# Patient Record
Sex: FEMALE | Race: WHITE | Employment: OTHER | URBAN - METROPOLITAN AREA
[De-identification: names, ages, dates, MRNs, and addresses within clinical notes are randomized per-mention and may not be internally consistent; named-entity substitution may affect disease eponyms.]

---

## 2017-01-04 ENCOUNTER — OFFICE VISIT (OUTPATIENT)
Dept: INTERNAL MEDICINE CLINIC | Age: 62
End: 2017-01-04

## 2017-01-04 VITALS
HEART RATE: 92 BPM | WEIGHT: 219 LBS | RESPIRATION RATE: 18 BRPM | SYSTOLIC BLOOD PRESSURE: 160 MMHG | DIASTOLIC BLOOD PRESSURE: 100 MMHG | BODY MASS INDEX: 34.37 KG/M2 | TEMPERATURE: 97.7 F | HEIGHT: 67 IN

## 2017-01-04 DIAGNOSIS — E03.9 ACQUIRED HYPOTHYROIDISM: ICD-10-CM

## 2017-01-04 DIAGNOSIS — J06.9 URI, ACUTE: ICD-10-CM

## 2017-01-04 DIAGNOSIS — I10 ESSENTIAL HYPERTENSION: ICD-10-CM

## 2017-01-04 DIAGNOSIS — N39.0 URINARY TRACT INFECTION WITHOUT HEMATURIA, SITE UNSPECIFIED: Primary | ICD-10-CM

## 2017-01-04 LAB
BILIRUBIN, POC: NEGATIVE
BLOOD URINE, POC: NEGATIVE
CLARITY, POC: CLEAR
COLOR, POC: YELLOW
GLUCOSE URINE, POC: NEGATIVE
KETONES, POC: NEGATIVE
LEUKOCYTE EST, POC: 25
NITRITE, POC: NEGATIVE
PH, POC: 7
PROTEIN, POC: NEGATIVE
SPECIFIC GRAVITY, POC: 1.01
TSH SERPL DL<=0.05 MIU/L-ACNC: 2.27 UIU/ML (ref 0.27–4.2)
UROBILINOGEN, POC: NEGATIVE

## 2017-01-04 PROCEDURE — 81002 URINALYSIS NONAUTO W/O SCOPE: CPT | Performed by: INTERNAL MEDICINE

## 2017-01-04 PROCEDURE — 36415 COLL VENOUS BLD VENIPUNCTURE: CPT | Performed by: INTERNAL MEDICINE

## 2017-01-04 PROCEDURE — 99214 OFFICE O/P EST MOD 30 MIN: CPT | Performed by: INTERNAL MEDICINE

## 2017-01-04 RX ORDER — SULFAMETHOXAZOLE AND TRIMETHOPRIM 800; 160 MG/1; MG/1
1 TABLET ORAL 2 TIMES DAILY
Qty: 20 TABLET | Refills: 0 | Status: SHIPPED | OUTPATIENT
Start: 2017-01-04 | End: 2017-01-14

## 2017-01-04 RX ORDER — METOPROLOL SUCCINATE 50 MG/1
50 TABLET, EXTENDED RELEASE ORAL 2 TIMES DAILY
Qty: 60 TABLET | Refills: 0 | Status: SHIPPED | OUTPATIENT
Start: 2017-01-04 | End: 2017-02-01 | Stop reason: SDUPTHER

## 2017-02-01 ENCOUNTER — OFFICE VISIT (OUTPATIENT)
Dept: INTERNAL MEDICINE CLINIC | Age: 62
End: 2017-02-01

## 2017-02-01 VITALS
RESPIRATION RATE: 18 BRPM | SYSTOLIC BLOOD PRESSURE: 122 MMHG | WEIGHT: 222 LBS | HEART RATE: 82 BPM | DIASTOLIC BLOOD PRESSURE: 84 MMHG | BODY MASS INDEX: 34.84 KG/M2 | HEIGHT: 67 IN

## 2017-02-01 DIAGNOSIS — I10 ESSENTIAL HYPERTENSION: ICD-10-CM

## 2017-02-01 DIAGNOSIS — E03.9 ACQUIRED HYPOTHYROIDISM: ICD-10-CM

## 2017-02-01 PROCEDURE — 99213 OFFICE O/P EST LOW 20 MIN: CPT | Performed by: INTERNAL MEDICINE

## 2017-02-01 RX ORDER — METOPROLOL SUCCINATE 50 MG/1
50 TABLET, EXTENDED RELEASE ORAL 2 TIMES DAILY
Qty: 180 TABLET | Refills: 0 | Status: SHIPPED | OUTPATIENT
Start: 2017-02-01 | End: 2017-04-06 | Stop reason: SDUPTHER

## 2017-02-01 RX ORDER — LEVOTHYROXINE SODIUM 0.05 MG/1
TABLET ORAL
Qty: 90 TABLET | Refills: 1 | Status: SHIPPED | OUTPATIENT
Start: 2017-02-01 | End: 2017-04-06 | Stop reason: SDUPTHER

## 2017-04-06 ENCOUNTER — OFFICE VISIT (OUTPATIENT)
Dept: INTERNAL MEDICINE CLINIC | Age: 62
End: 2017-04-06

## 2017-04-06 VITALS
RESPIRATION RATE: 16 BRPM | DIASTOLIC BLOOD PRESSURE: 82 MMHG | SYSTOLIC BLOOD PRESSURE: 124 MMHG | BODY MASS INDEX: 35.31 KG/M2 | HEIGHT: 67 IN | HEART RATE: 68 BPM | WEIGHT: 225 LBS

## 2017-04-06 DIAGNOSIS — I10 ESSENTIAL HYPERTENSION: ICD-10-CM

## 2017-04-06 DIAGNOSIS — E03.9 ACQUIRED HYPOTHYROIDISM: ICD-10-CM

## 2017-04-06 PROCEDURE — 99213 OFFICE O/P EST LOW 20 MIN: CPT | Performed by: INTERNAL MEDICINE

## 2017-04-06 RX ORDER — LEVOTHYROXINE SODIUM 0.05 MG/1
TABLET ORAL
Qty: 90 TABLET | Refills: 0 | Status: SHIPPED | OUTPATIENT
Start: 2017-07-05 | End: 2017-10-12 | Stop reason: SDUPTHER

## 2017-04-06 RX ORDER — METOPROLOL SUCCINATE 50 MG/1
50 TABLET, EXTENDED RELEASE ORAL DAILY
Qty: 90 TABLET | Refills: 1 | Status: SHIPPED | OUTPATIENT
Start: 2017-04-06 | End: 2017-10-12 | Stop reason: SDUPTHER

## 2017-05-17 ENCOUNTER — TELEPHONE (OUTPATIENT)
Dept: INTERNAL MEDICINE CLINIC | Age: 62
End: 2017-05-17

## 2017-05-18 ENCOUNTER — OFFICE VISIT (OUTPATIENT)
Dept: INTERNAL MEDICINE CLINIC | Age: 62
End: 2017-05-18

## 2017-05-18 VITALS
DIASTOLIC BLOOD PRESSURE: 86 MMHG | BODY MASS INDEX: 35.63 KG/M2 | RESPIRATION RATE: 16 BRPM | SYSTOLIC BLOOD PRESSURE: 128 MMHG | WEIGHT: 227 LBS | HEIGHT: 67 IN | HEART RATE: 58 BPM

## 2017-05-18 DIAGNOSIS — I10 ESSENTIAL HYPERTENSION: Primary | ICD-10-CM

## 2017-05-18 DIAGNOSIS — E03.9 ACQUIRED HYPOTHYROIDISM: ICD-10-CM

## 2017-05-18 DIAGNOSIS — F43.29 STRESS AND ADJUSTMENT REACTION: ICD-10-CM

## 2017-05-18 PROCEDURE — 99214 OFFICE O/P EST MOD 30 MIN: CPT | Performed by: INTERNAL MEDICINE

## 2017-05-18 RX ORDER — LISINOPRIL 10 MG/1
10 TABLET ORAL DAILY
COMMUNITY
End: 2017-06-19 | Stop reason: ALTCHOICE

## 2017-05-18 RX ORDER — DULOXETIN HYDROCHLORIDE 30 MG/1
30 CAPSULE, DELAYED RELEASE ORAL DAILY
Qty: 30 CAPSULE | Refills: 0 | Status: SHIPPED | OUTPATIENT
Start: 2017-05-18 | End: 2017-06-14 | Stop reason: SDUPTHER

## 2017-06-14 DIAGNOSIS — F43.29 STRESS AND ADJUSTMENT REACTION: ICD-10-CM

## 2017-06-14 RX ORDER — DULOXETIN HYDROCHLORIDE 30 MG/1
CAPSULE, DELAYED RELEASE ORAL
Qty: 30 CAPSULE | Refills: 0 | Status: SHIPPED | OUTPATIENT
Start: 2017-06-14 | End: 2017-08-07

## 2017-06-19 ENCOUNTER — OFFICE VISIT (OUTPATIENT)
Dept: INTERNAL MEDICINE CLINIC | Age: 62
End: 2017-06-19

## 2017-06-19 VITALS
HEIGHT: 67 IN | WEIGHT: 225 LBS | HEART RATE: 58 BPM | DIASTOLIC BLOOD PRESSURE: 84 MMHG | RESPIRATION RATE: 16 BRPM | SYSTOLIC BLOOD PRESSURE: 115 MMHG | BODY MASS INDEX: 35.31 KG/M2

## 2017-06-19 DIAGNOSIS — F43.29 STRESS AND ADJUSTMENT REACTION: ICD-10-CM

## 2017-06-19 DIAGNOSIS — I10 ESSENTIAL HYPERTENSION: Primary | ICD-10-CM

## 2017-06-19 PROCEDURE — 99213 OFFICE O/P EST LOW 20 MIN: CPT | Performed by: INTERNAL MEDICINE

## 2017-06-19 RX ORDER — CITALOPRAM 20 MG/1
20 TABLET ORAL DAILY
Qty: 30 TABLET | Refills: 0 | Status: SHIPPED | OUTPATIENT
Start: 2017-06-19 | End: 2017-08-07 | Stop reason: DRUGHIGH

## 2017-08-07 ENCOUNTER — OFFICE VISIT (OUTPATIENT)
Dept: INTERNAL MEDICINE CLINIC | Age: 62
End: 2017-08-07

## 2017-08-07 VITALS
HEART RATE: 64 BPM | HEIGHT: 67 IN | BODY MASS INDEX: 34.53 KG/M2 | SYSTOLIC BLOOD PRESSURE: 126 MMHG | RESPIRATION RATE: 16 BRPM | DIASTOLIC BLOOD PRESSURE: 84 MMHG | WEIGHT: 220 LBS

## 2017-08-07 DIAGNOSIS — E03.9 ACQUIRED HYPOTHYROIDISM: ICD-10-CM

## 2017-08-07 DIAGNOSIS — F43.29 STRESS AND ADJUSTMENT REACTION: Primary | ICD-10-CM

## 2017-08-07 DIAGNOSIS — I10 ESSENTIAL HYPERTENSION: ICD-10-CM

## 2017-08-07 PROCEDURE — 99213 OFFICE O/P EST LOW 20 MIN: CPT | Performed by: INTERNAL MEDICINE

## 2017-08-07 RX ORDER — CITALOPRAM 40 MG/1
40 TABLET ORAL DAILY
Qty: 30 TABLET | Refills: 0 | Status: SHIPPED | OUTPATIENT
Start: 2017-08-07 | End: 2017-10-12

## 2017-08-24 ENCOUNTER — TELEPHONE (OUTPATIENT)
Dept: INTERNAL MEDICINE CLINIC | Age: 62
End: 2017-08-24

## 2017-09-12 ENCOUNTER — OFFICE VISIT (OUTPATIENT)
Dept: INTERNAL MEDICINE CLINIC | Age: 62
End: 2017-09-12

## 2017-09-12 VITALS
WEIGHT: 221 LBS | BODY MASS INDEX: 34.69 KG/M2 | SYSTOLIC BLOOD PRESSURE: 124 MMHG | RESPIRATION RATE: 16 BRPM | HEART RATE: 64 BPM | DIASTOLIC BLOOD PRESSURE: 86 MMHG | HEIGHT: 67 IN

## 2017-09-12 DIAGNOSIS — K21.9 GASTROESOPHAGEAL REFLUX DISEASE WITHOUT ESOPHAGITIS: ICD-10-CM

## 2017-09-12 DIAGNOSIS — I10 ESSENTIAL HYPERTENSION: ICD-10-CM

## 2017-09-12 DIAGNOSIS — E03.9 ACQUIRED HYPOTHYROIDISM: ICD-10-CM

## 2017-09-12 DIAGNOSIS — F43.29 STRESS AND ADJUSTMENT REACTION: ICD-10-CM

## 2017-09-12 DIAGNOSIS — R19.7 DIARRHEA, UNSPECIFIED TYPE: Primary | ICD-10-CM

## 2017-09-12 LAB
T4 FREE: 1.6 NG/DL (ref 0.9–1.8)
TSH SERPL DL<=0.05 MIU/L-ACNC: 1.24 UIU/ML (ref 0.27–4.2)

## 2017-09-12 PROCEDURE — 99214 OFFICE O/P EST MOD 30 MIN: CPT | Performed by: INTERNAL MEDICINE

## 2017-09-12 PROCEDURE — 36415 COLL VENOUS BLD VENIPUNCTURE: CPT | Performed by: INTERNAL MEDICINE

## 2017-09-12 RX ORDER — DICYCLOMINE HYDROCHLORIDE 10 MG/1
CAPSULE ORAL
Qty: 90 CAPSULE | Refills: 0 | Status: SHIPPED | OUTPATIENT
Start: 2017-09-12 | End: 2019-05-22

## 2017-09-12 RX ORDER — METRONIDAZOLE 500 MG/1
500 TABLET ORAL 3 TIMES DAILY
Qty: 30 TABLET | Refills: 0 | Status: SHIPPED | OUTPATIENT
Start: 2017-09-12 | End: 2017-09-22

## 2017-09-13 ENCOUNTER — HOSPITAL ENCOUNTER (OUTPATIENT)
Dept: OTHER | Age: 62
Discharge: HOME | End: 2017-09-13
Attending: INTERNAL MEDICINE | Admitting: INTERNAL MEDICINE

## 2017-10-12 ENCOUNTER — OFFICE VISIT (OUTPATIENT)
Dept: INTERNAL MEDICINE CLINIC | Age: 62
End: 2017-10-12

## 2017-10-12 VITALS
HEART RATE: 78 BPM | RESPIRATION RATE: 16 BRPM | WEIGHT: 225 LBS | BODY MASS INDEX: 35.31 KG/M2 | DIASTOLIC BLOOD PRESSURE: 82 MMHG | HEIGHT: 67 IN | SYSTOLIC BLOOD PRESSURE: 115 MMHG

## 2017-10-12 DIAGNOSIS — Z11.4 SCREENING FOR HIV (HUMAN IMMUNODEFICIENCY VIRUS): ICD-10-CM

## 2017-10-12 DIAGNOSIS — Z23 NEED FOR INFLUENZA VACCINATION: ICD-10-CM

## 2017-10-12 DIAGNOSIS — Z00.00 ANNUAL PHYSICAL EXAM: Primary | ICD-10-CM

## 2017-10-12 DIAGNOSIS — F43.29 STRESS AND ADJUSTMENT REACTION: ICD-10-CM

## 2017-10-12 DIAGNOSIS — Z23 NEED FOR SHINGLES VACCINE: ICD-10-CM

## 2017-10-12 DIAGNOSIS — E03.9 ACQUIRED HYPOTHYROIDISM: ICD-10-CM

## 2017-10-12 DIAGNOSIS — K21.9 GASTROESOPHAGEAL REFLUX DISEASE WITHOUT ESOPHAGITIS: ICD-10-CM

## 2017-10-12 DIAGNOSIS — Z11.59 ENCOUNTER FOR HEPATITIS C SCREENING TEST FOR LOW RISK PATIENT: ICD-10-CM

## 2017-10-12 DIAGNOSIS — I10 ESSENTIAL HYPERTENSION: ICD-10-CM

## 2017-10-12 LAB
A/G RATIO: 1.7 (ref 1.1–2.2)
ALBUMIN SERPL-MCNC: 4.2 G/DL (ref 3.4–5)
ALP BLD-CCNC: 119 U/L (ref 40–129)
ALT SERPL-CCNC: 19 U/L (ref 10–40)
ANION GAP SERPL CALCULATED.3IONS-SCNC: 14 MMOL/L (ref 3–16)
AST SERPL-CCNC: 19 U/L (ref 15–37)
BASOPHILS ABSOLUTE: 0.1 K/UL (ref 0–0.2)
BASOPHILS RELATIVE PERCENT: 1.2 %
BILIRUB SERPL-MCNC: 1.3 MG/DL (ref 0–1)
BUN BLDV-MCNC: 14 MG/DL (ref 7–20)
CALCIUM SERPL-MCNC: 9.5 MG/DL (ref 8.3–10.6)
CHLORIDE BLD-SCNC: 101 MMOL/L (ref 99–110)
CHOLESTEROL, TOTAL: 183 MG/DL (ref 0–199)
CO2: 27 MMOL/L (ref 21–32)
CREAT SERPL-MCNC: 0.7 MG/DL (ref 0.6–1.2)
EOSINOPHILS ABSOLUTE: 0.1 K/UL (ref 0–0.6)
EOSINOPHILS RELATIVE PERCENT: 2.3 %
GFR AFRICAN AMERICAN: >60
GFR NON-AFRICAN AMERICAN: >60
GLOBULIN: 2.5 G/DL
GLUCOSE BLD-MCNC: 94 MG/DL (ref 70–99)
HCT VFR BLD CALC: 49.8 % (ref 36–48)
HDLC SERPL-MCNC: 40 MG/DL (ref 40–60)
HEMOGLOBIN: 16 G/DL (ref 12–16)
LDL CHOLESTEROL CALCULATED: 121 MG/DL
LYMPHOCYTES ABSOLUTE: 1.4 K/UL (ref 1–5.1)
LYMPHOCYTES RELATIVE PERCENT: 28 %
MCH RBC QN AUTO: 29.3 PG (ref 26–34)
MCHC RBC AUTO-ENTMCNC: 32.2 G/DL (ref 31–36)
MCV RBC AUTO: 91 FL (ref 80–100)
MONOCYTES ABSOLUTE: 0.4 K/UL (ref 0–1.3)
MONOCYTES RELATIVE PERCENT: 8.5 %
NEUTROPHILS ABSOLUTE: 2.9 K/UL (ref 1.7–7.7)
NEUTROPHILS RELATIVE PERCENT: 60 %
PDW BLD-RTO: 14.5 % (ref 12.4–15.4)
PLATELET # BLD: 183 K/UL (ref 135–450)
PMV BLD AUTO: 9.6 FL (ref 5–10.5)
POTASSIUM SERPL-SCNC: 4.4 MMOL/L (ref 3.5–5.1)
RBC # BLD: 5.47 M/UL (ref 4–5.2)
SODIUM BLD-SCNC: 142 MMOL/L (ref 136–145)
TOTAL PROTEIN: 6.7 G/DL (ref 6.4–8.2)
TRIGL SERPL-MCNC: 112 MG/DL (ref 0–150)
VLDLC SERPL CALC-MCNC: 22 MG/DL
WBC # BLD: 4.9 K/UL (ref 4–11)

## 2017-10-12 PROCEDURE — 90471 IMMUNIZATION ADMIN: CPT | Performed by: INTERNAL MEDICINE

## 2017-10-12 PROCEDURE — 90688 IIV4 VACCINE SPLT 0.5 ML IM: CPT | Performed by: INTERNAL MEDICINE

## 2017-10-12 PROCEDURE — 90472 IMMUNIZATION ADMIN EACH ADD: CPT | Performed by: INTERNAL MEDICINE

## 2017-10-12 PROCEDURE — 36415 COLL VENOUS BLD VENIPUNCTURE: CPT | Performed by: INTERNAL MEDICINE

## 2017-10-12 PROCEDURE — 90736 HZV VACCINE LIVE SUBQ: CPT | Performed by: INTERNAL MEDICINE

## 2017-10-12 PROCEDURE — 99396 PREV VISIT EST AGE 40-64: CPT | Performed by: INTERNAL MEDICINE

## 2017-10-12 RX ORDER — LEVOTHYROXINE SODIUM 0.05 MG/1
TABLET ORAL
Qty: 90 TABLET | Refills: 1 | Status: SHIPPED | OUTPATIENT
Start: 2017-10-12 | End: 2018-04-09 | Stop reason: SDUPTHER

## 2017-10-12 RX ORDER — METOPROLOL SUCCINATE 50 MG/1
50 TABLET, EXTENDED RELEASE ORAL DAILY
Qty: 90 TABLET | Refills: 1 | Status: SHIPPED | OUTPATIENT
Start: 2017-10-12 | End: 2018-04-09 | Stop reason: ALTCHOICE

## 2017-10-12 NOTE — PROGRESS NOTES
 Obesity, Class II, BMI 35-39.9, with comorbidity ([address] 10/19/2015     Past Surgical History:   Procedure Laterality Date    COLONOSCOPY  2005    COLONOSCOPY      PARTIAL HYSTERECTOMY  2000    due to Fibroid     Family History   Problem Relation Age of Onset    Heart Disease Mother      A. Fib    Alzheimer's Disease Mother     Arthritis Father     Hearing Loss Father     Heart Disease Father     Other Father      Alpha 1 antitrypson deficiency    Miscarriages / [place] Sister     Other Sister      MS and Alpha 1 antitrypson deficiency    Kidney Disease Brother      Kidney stone    Stroke Maternal Grandmother     Heart Disease Maternal Grandmother     Diabetes Paternal Grandfather     Diabetes Paternal Aunt     Other Paternal Uncle      Alpha 1 antitrypson deficiency    Early Death Maternal Grandfather 47    Other Paternal Grandmother      Alpha 1 antitrypson deficiency    Cirrhosis Paternal Grandmother      Non alcoholic    Asthma Neg Hx     Birth Defects Neg Hx     Cancer Neg Hx     Depression Neg Hx     High Blood Pressure Neg Hx     High Cholesterol Neg Hx     Learning Disabilities Neg Hx     Mental Illness Neg Hx     Mental Retardation Neg Hx     Substance Abuse Neg Hx      Social History     Social History    Marital status:      Spouse name: N/A    Number of children: N/A    Years of education: N/A     Occupational History    Not on file. Social History Main Topics    Smoking status: Never Smoker    Smokeless tobacco: Never Used    Alcohol use No    Drug use: No    Sexual activity: No     Other Topics Concern    Not on file     Social History Narrative    No narrative on file       Review of Systems:  A comprehensive review of systems was negative except for what was noted in the HPI.      Physical Exam:   Vitals:    10/12/17 1022   BP: 115/82   Pulse: 78   Resp: 16   Weight: 225 lb (102.1 kg)   Height: 5' 7\" (1.702 m)     Body mass index is 35.24 kg/m². Constitutional: She is oriented to person, place, and time. She appears well-developed and well-nourished. No distress. HEENT:   Head: Normocephalic and atraumatic. Right Ear: Tympanic membrane, external ear and ear canal normal.   Left Ear: Tympanic membrane, external ear and ear canal normal.   Mouth/Throat: Oropharynx is clear and moist, and mucous membranes are normal.  There is no cervical adenopathy. Eyes: Conjunctivae and extraocular motions are normal. Pupils are equal, round, and reactive to light. Neck: Supple. No JVD present. Carotid bruit is not present. No mass and no thyromegaly present. Cardiovascular: Normal rate, regular rhythm, normal heart sounds and intact distal pulses. Exam reveals no gallop and no friction rub. No murmur heard. Pulmonary/Chest: Effort normal and breath sounds normal. No respiratory distress. She has no wheezes, rhonchi or rales. Abdominal: Soft, non-tender. Bowel sounds and aorta are normal. She exhibits no organomegaly, mass or bruit. Genitourinary: performed by gynecologist.  Breast exam:  performed by specialist.  Musculoskeletal: Normal range of motion, no synovitis. She exhibits no edema. Neurological: She is alert and oriented to person, place, and time. She has normal reflexes. No cranial nerve deficit. Coordination normal.   Skin: Skin is warm and dry. There is no rash or erythema. No suspicious lesions noted. Psychiatric: She has a normal mood and affect.  Her speech is normal and behavior is normal. Judgment, cognition and memory are normal.       Lab Results   Component Value Date    LABA1C 5.7 10/05/2016     Lab Results   Component Value Date     10/05/2016    K 4.0 10/05/2016     10/05/2016    CO2 26 10/05/2016    BUN 15 10/05/2016    CREATININE 0.8 10/05/2016    GLUCOSE 116 (H) 10/05/2016    CALCIUM 9.4 10/05/2016     Lab Results   Component Value Date    CHOL 176 10/05/2016    TRIG 123 10/05/2016    HDL 42 10/05/2016 [address] 109 10/05/2016     Lab Results   Component Value Date    ALT 17 10/05/2016    AST 17 10/05/2016     Lab Results   Component Value Date    TSH 1.24 09/12/2017    TSH 2.27 01/04/2017    T4FREE 1.6 09/12/2017    T4FREE 1.7 06/29/2016     Lab Results   Component Value Date    WBC 5.1 10/05/2016    HGB 15.3 10/05/2016    HCT 46.9 10/05/2016    MCV 90.0 10/05/2016     10/05/2016     No results found for: INR   No results found for: PSA   No results found for: [facility]         Preventive Care:  Health Maintenance   Topic Date Due    Hepatitis C screen  1955    HIV screen  07/19/1970    DTaP/Tdap/Td vaccine (1 - Tdap) 07/19/1974    Zostavax vaccine  07/19/2015    Flu vaccine (1) 09/01/2017    Breast cancer screen  10/19/2018    Diabetes screen  10/05/2019    Colon cancer screen colonoscopy  03/31/2021    Lipid screen  10/05/2021      Hx abnormal PAP: no  Sexual activity: none   Last eye exam: 2017, normal  Exercise: walks 3 time(s) per week  Seatbelt use: yes       Preventive plan of care for Jadyn Schrader        10/12/2017           Preventive Measures Status       Recommendations for screening   Colon Cancer Screen   Colonoscopy Test is due 2021   Breast Cancer Screen  Mammogram Repeat yearly   Cervical Cancer Screen   PAP smear: This test is not clinically indicated   Osteoporosis Screen   DEXA scan 2016 Repeat every 3 years   Diabetes Screen  Glucose (mg/dL)   Date Value   10/05/2016 116 (H)    Test recommended and ordered   Cholesterol Screen  Lab Results   Component Value Date    CHOL 176 10/05/2016    TRIG 123 10/05/2016    HDL 42 10/05/2016    LDLCALC 109 (H) 10/05/2016    Test recommended and ordered   Aspirin for Cardiovascular Prevention   No Not indicated   Weight: Body mass index is 35.24 kg/m².   5' 7\" (1.702 m)225 lb (102.1 kg)    Your BMI is 25 or greater, which indicates that you are overweight   Living Will: Yes   Copy requested    Recommended Immunizations    Immunization History   Administered Date(s) Administered    Influenza Virus Vaccine 10/19/2015    Influenza, Bell Clark, 3 yrs and older, IM, Preservative Free 10/05/2016        Influenza vaccine:  recommended every fall, administered today, risks and benefits discussed  Shingles vaccine:  administered today- risks and benefits discussed         Other Recommendations ·   See a dentist every 6 months  · Try to get at least 30 minutes of exercise 3-5 days per week  · Always wear a seat belt when traveling in a car  · Always wear a helmet when riding a bicycle or motorcycle  · When exposed to the sun, use a sunscreen that protects against both UVA and UVB radiation with an SPF of 30 or greater- reapply every 2 to 3 hours or after sweating, drying off with a towel, or swimming  · You need 1331-4063 mg of calcium and 3569-4183 IU of vitamin D per day- it is possible to meet your calcium requirement with diet alone, but a vitamin D supplement is usually necessary                 Assessment/Plan:    Aria Doherty was seen today for annual exam.    Diagnoses and all orders for this visit:    Annual physical exam  -     Lipid Panel  -     Comprehensive Metabolic Panel  -     CBC Auto Differential  -     Hemoglobin A1C    Screening for HIV (human immunodeficiency virus)  -     HIV Screen    Encounter for hepatitis C screening test for low risk patient  -     Hepatitis C Antibody    Need for influenza vaccination  -     INFLUENZA, QUADV, 3 YRS AND OLDER, IM, MDV, 0.5ML (FLUZONE QUADV)    Need for shingles vaccine  -     Cancel: CO ZOSTER VACCINE HZV LIVE FOR SUBCUTANEOUS USE  -     Varicella-zoster vaccine subcutaneous (ZOSTAVAX)    Acquired hypothyroidism  -     levothyroxine (SYNTHROID) 50 MCG tablet; TAKE 1 TABLET BY MOUTH DAILY    Essential hypertension  -     metoprolol succinate (TOPROL XL) 50 MG extended release tablet;  Take 1 tablet by mouth daily    Gastroesophageal reflux disease without esophagitis    Stress and adjustment reaction    Obesity, Class II, BMI 35-39.9, with comorbidity (HCC)        Return 6 month on BP, mood.

## 2017-10-12 NOTE — PATIENT INSTRUCTIONS
Preventive plan of care for Raina Coyle        10/12/2017           Preventive Measures Status       Recommendations for screening   Colon Cancer Screen   Colonoscopy Test is due 2021   Breast Cancer Screen  Mammogram Repeat yearly   Cervical Cancer Screen   PAP smear: This test is not clinically indicated   Osteoporosis Screen   DEXA scan 2016 Repeat every 3 years   Diabetes Screen  Glucose (mg/dL)   Date Value   10/05/2016 116 (H)    Test recommended and ordered   Cholesterol Screen  Lab Results   Component Value Date    CHOL 176 10/05/2016    TRIG 123 10/05/2016    HDL 42 10/05/2016    LDLCALC 109 (H) 10/05/2016    Test recommended and ordered   Aspirin for Cardiovascular Prevention   No Not indicated   Weight: Body mass index is 35.24 kg/m².   5' 7\" (1.702 m)225 lb (102.1 kg)    Your BMI is 25 or greater, which indicates that you are overweight   Living Will: Yes   Copy requested    Recommended Immunizations    Immunization History   Administered Date(s) Administered    Influenza Virus Vaccine 10/19/2015    Influenza, Ruby Hyde, 3 yrs and older, IM, Preservative Free 10/05/2016        Influenza vaccine:  recommended every fall, administered today, risks and benefits discussed  Shingles vaccine:  administered today- risks and benefits discussed         Other Recommendations ·   See a dentist every 6 months  · Try to get at least 30 minutes of exercise 3-5 days per week  · Always wear a seat belt when traveling in a car  · Always wear a helmet when riding a bicycle or motorcycle  · When exposed to the sun, use a sunscreen that protects against both UVA and UVB radiation with an SPF of 30 or greater- reapply every 2 to 3 hours or after sweating, drying off with a towel, or swimming  · You need 0500-9946 mg of calcium and 6191-2969 IU of vitamin D per day- it is possible to meet your calcium requirement with diet alone, but a vitamin D supplement is usually necessary

## 2017-10-13 LAB
ESTIMATED AVERAGE GLUCOSE: 139.9 MG/DL
HBA1C MFR BLD: 6.5 %
HEPATITIS C ANTIBODY INTERPRETATION: NORMAL
HIV-1 AND HIV-2 ANTIBODIES: NORMAL

## 2018-04-09 ENCOUNTER — OFFICE VISIT (OUTPATIENT)
Dept: INTERNAL MEDICINE CLINIC | Age: 63
End: 2018-04-09

## 2018-04-09 VITALS
HEART RATE: 56 BPM | BODY MASS INDEX: 36.41 KG/M2 | OXYGEN SATURATION: 96 % | HEIGHT: 67 IN | WEIGHT: 232 LBS | DIASTOLIC BLOOD PRESSURE: 82 MMHG | RESPIRATION RATE: 16 BRPM | SYSTOLIC BLOOD PRESSURE: 138 MMHG

## 2018-04-09 DIAGNOSIS — E03.9 ACQUIRED HYPOTHYROIDISM: ICD-10-CM

## 2018-04-09 DIAGNOSIS — I10 ESSENTIAL HYPERTENSION: Primary | ICD-10-CM

## 2018-04-09 DIAGNOSIS — K21.9 GASTROESOPHAGEAL REFLUX DISEASE WITHOUT ESOPHAGITIS: ICD-10-CM

## 2018-04-09 DIAGNOSIS — F43.29 STRESS AND ADJUSTMENT REACTION: ICD-10-CM

## 2018-04-09 PROCEDURE — 99214 OFFICE O/P EST MOD 30 MIN: CPT | Performed by: INTERNAL MEDICINE

## 2018-04-09 RX ORDER — METOPROLOL SUCCINATE 100 MG/1
100 TABLET, EXTENDED RELEASE ORAL DAILY
Qty: 90 TABLET | Refills: 1 | Status: SHIPPED | OUTPATIENT
Start: 2018-04-09 | End: 2018-10-18 | Stop reason: SDUPTHER

## 2018-04-09 RX ORDER — LEVOTHYROXINE SODIUM 0.05 MG/1
TABLET ORAL
Qty: 90 TABLET | Refills: 1 | Status: SHIPPED | OUTPATIENT
Start: 2018-04-09 | End: 2018-10-18 | Stop reason: SDUPTHER

## 2018-04-09 RX ORDER — OMEPRAZOLE 20 MG/1
20 TABLET, DELAYED RELEASE ORAL DAILY
Qty: 90 TABLET | Refills: 1 | Status: SHIPPED | OUTPATIENT
Start: 2018-04-09 | End: 2019-04-18 | Stop reason: SDUPTHER

## 2018-04-09 ASSESSMENT — PATIENT HEALTH QUESTIONNAIRE - PHQ9
5. POOR APPETITE OR OVEREATING: 3
7. TROUBLE CONCENTRATING ON THINGS, SUCH AS READING THE NEWSPAPER OR WATCHING TELEVISION: 0
9. THOUGHTS THAT YOU WOULD BE BETTER OFF DEAD, OR OF HURTING YOURSELF: 0
1. LITTLE INTEREST OR PLEASURE IN DOING THINGS: 3
10. IF YOU CHECKED OFF ANY PROBLEMS, HOW DIFFICULT HAVE THESE PROBLEMS MADE IT FOR YOU TO DO YOUR WORK, TAKE CARE OF THINGS AT HOME, OR GET ALONG WITH OTHER PEOPLE: 0
6. FEELING BAD ABOUT YOURSELF - OR THAT YOU ARE A FAILURE OR HAVE LET YOURSELF OR YOUR FAMILY DOWN: 0
3. TROUBLE FALLING OR STAYING ASLEEP: 0
SUM OF ALL RESPONSES TO PHQ9 QUESTIONS 1 & 2: 3
4. FEELING TIRED OR HAVING LITTLE ENERGY: 3
SUM OF ALL RESPONSES TO PHQ QUESTIONS 1-9: 9
8. MOVING OR SPEAKING SO SLOWLY THAT OTHER PEOPLE COULD HAVE NOTICED. OR THE OPPOSITE, BEING SO FIGETY OR RESTLESS THAT YOU HAVE BEEN MOVING AROUND A LOT MORE THAN USUAL: 0
2. FEELING DOWN, DEPRESSED OR HOPELESS: 0

## 2018-10-18 ENCOUNTER — OFFICE VISIT (OUTPATIENT)
Dept: INTERNAL MEDICINE CLINIC | Age: 63
End: 2018-10-18
Payer: COMMERCIAL

## 2018-10-18 VITALS
BODY MASS INDEX: 37.2 KG/M2 | HEIGHT: 67 IN | SYSTOLIC BLOOD PRESSURE: 122 MMHG | HEART RATE: 50 BPM | DIASTOLIC BLOOD PRESSURE: 78 MMHG | OXYGEN SATURATION: 98 % | WEIGHT: 237 LBS

## 2018-10-18 DIAGNOSIS — I10 ESSENTIAL HYPERTENSION: ICD-10-CM

## 2018-10-18 DIAGNOSIS — Z00.00 ANNUAL PHYSICAL EXAM: Primary | ICD-10-CM

## 2018-10-18 DIAGNOSIS — E03.9 ACQUIRED HYPOTHYROIDISM: ICD-10-CM

## 2018-10-18 DIAGNOSIS — K21.9 GASTROESOPHAGEAL REFLUX DISEASE WITHOUT ESOPHAGITIS: ICD-10-CM

## 2018-10-18 LAB
A/G RATIO: 2.2 (ref 1.1–2.2)
ALBUMIN SERPL-MCNC: 4.6 G/DL (ref 3.4–5)
ALP BLD-CCNC: 98 U/L (ref 40–129)
ALT SERPL-CCNC: 20 U/L (ref 10–40)
ANION GAP SERPL CALCULATED.3IONS-SCNC: 16 MMOL/L (ref 3–16)
AST SERPL-CCNC: 19 U/L (ref 15–37)
BASOPHILS ABSOLUTE: 0 K/UL (ref 0–0.2)
BASOPHILS RELATIVE PERCENT: 0.7 %
BILIRUB SERPL-MCNC: 0.9 MG/DL (ref 0–1)
BUN BLDV-MCNC: 19 MG/DL (ref 7–20)
CALCIUM SERPL-MCNC: 9.8 MG/DL (ref 8.3–10.6)
CHLORIDE BLD-SCNC: 99 MMOL/L (ref 99–110)
CHOLESTEROL, TOTAL: 156 MG/DL (ref 0–199)
CO2: 26 MMOL/L (ref 21–32)
CREAT SERPL-MCNC: 0.8 MG/DL (ref 0.6–1.2)
EOSINOPHILS ABSOLUTE: 0.2 K/UL (ref 0–0.6)
EOSINOPHILS RELATIVE PERCENT: 3.2 %
GFR AFRICAN AMERICAN: >60
GFR NON-AFRICAN AMERICAN: >60
GLOBULIN: 2.1 G/DL
GLUCOSE BLD-MCNC: 114 MG/DL (ref 70–99)
HCT VFR BLD CALC: 47.5 % (ref 36–48)
HDLC SERPL-MCNC: 40 MG/DL (ref 40–60)
HEMOGLOBIN: 15.7 G/DL (ref 12–16)
LDL CHOLESTEROL CALCULATED: 101 MG/DL
LYMPHOCYTES ABSOLUTE: 1.2 K/UL (ref 1–5.1)
LYMPHOCYTES RELATIVE PERCENT: 23.6 %
MCH RBC QN AUTO: 29.8 PG (ref 26–34)
MCHC RBC AUTO-ENTMCNC: 33 G/DL (ref 31–36)
MCV RBC AUTO: 90.3 FL (ref 80–100)
MONOCYTES ABSOLUTE: 0.5 K/UL (ref 0–1.3)
MONOCYTES RELATIVE PERCENT: 9.7 %
NEUTROPHILS ABSOLUTE: 3.2 K/UL (ref 1.7–7.7)
NEUTROPHILS RELATIVE PERCENT: 62.8 %
PDW BLD-RTO: 14.4 % (ref 12.4–15.4)
PLATELET # BLD: 177 K/UL (ref 135–450)
PMV BLD AUTO: 9.3 FL (ref 5–10.5)
POTASSIUM SERPL-SCNC: 4.6 MMOL/L (ref 3.5–5.1)
RBC # BLD: 5.26 M/UL (ref 4–5.2)
SODIUM BLD-SCNC: 141 MMOL/L (ref 136–145)
TOTAL PROTEIN: 6.7 G/DL (ref 6.4–8.2)
TRIGL SERPL-MCNC: 74 MG/DL (ref 0–150)
TSH SERPL DL<=0.05 MIU/L-ACNC: 2.59 UIU/ML (ref 0.27–4.2)
VLDLC SERPL CALC-MCNC: 15 MG/DL
WBC # BLD: 5.1 K/UL (ref 4–11)

## 2018-10-18 PROCEDURE — 99396 PREV VISIT EST AGE 40-64: CPT | Performed by: INTERNAL MEDICINE

## 2018-10-18 PROCEDURE — 36415 COLL VENOUS BLD VENIPUNCTURE: CPT | Performed by: INTERNAL MEDICINE

## 2018-10-18 RX ORDER — VITAMIN B COMPLEX
1 CAPSULE ORAL DAILY
COMMUNITY
End: 2019-12-13 | Stop reason: ALTCHOICE

## 2018-10-18 RX ORDER — CHLORAL HYDRATE 500 MG
3000 CAPSULE ORAL EVERY OTHER DAY
COMMUNITY
End: 2019-06-07 | Stop reason: DRUGHIGH

## 2018-10-18 RX ORDER — METOPROLOL SUCCINATE 100 MG/1
100 TABLET, EXTENDED RELEASE ORAL DAILY
Qty: 90 TABLET | Refills: 1 | Status: SHIPPED | OUTPATIENT
Start: 2018-10-18 | End: 2019-04-18 | Stop reason: SDUPTHER

## 2018-10-18 RX ORDER — LEVOTHYROXINE SODIUM 0.05 MG/1
TABLET ORAL
Qty: 90 TABLET | Refills: 1 | Status: SHIPPED | OUTPATIENT
Start: 2018-10-18 | End: 2019-04-18 | Stop reason: SDUPTHER

## 2018-10-18 ASSESSMENT — PATIENT HEALTH QUESTIONNAIRE - PHQ9
SUM OF ALL RESPONSES TO PHQ QUESTIONS 1-9: 0
2. FEELING DOWN, DEPRESSED OR HOPELESS: 0
1. LITTLE INTEREST OR PLEASURE IN DOING THINGS: 0
SUM OF ALL RESPONSES TO PHQ9 QUESTIONS 1 & 2: 0
SUM OF ALL RESPONSES TO PHQ QUESTIONS 1-9: 0

## 2018-10-18 NOTE — PROGRESS NOTES
[facility]  History and Physical  Debby Murphy M.D. Carlos De Leon  YOB: 1955    Date of Service:  10/18/2018    Chief Complaint:   Carlos De Leon is a 61 y.o. female who presents for   Chief Complaint   Patient presents with    Annual Exam       HPI: Here for Annual Physical and Follow up. Hypertension: Home blood pressure monitoring: Yes - 139/80's on Metoprolol succinate (toprol XL) 100 mg qd. She is adherent to a low sodium diet. Patient denies chest pain, shortness of breath, lightheadedness, blurred vision, peripheral edema, palpitations, dry cough and fatigue. Antihypertensive medication side effects: no medication side effects noted. Use of agents associated with hypertension: none. Hypothyroid: Recent symptoms: none on Synthrid 50 mcg qd. She denies fatigue, constipation, weight gain, weight loss, cold intolerance, heat intolerance, hair loss, dry skin, diarrhea, edema, anxiety, tremor, palpitations and dysphagia. Patient is taking her medication consistently on an empty stomach. GERD: stable on Prilosec 20 mg qd prn twice a month. .  Diarrhea improved with eating yogurt    Lab Results   Component Value Date    LABA1C 6.5 10/12/2017    LABA1C 5.7 10/05/2016     Lab Results   Component Value Date     10/12/2017    K 4.4 10/12/2017     10/12/2017    CO2 27 10/12/2017    BUN 14 10/12/2017    CREATININE 0.7 10/12/2017    GLUCOSE 94 10/12/2017    CALCIUM 9.5 10/12/2017     Lab Results   Component Value Date    CHOL 183 10/12/2017    TRIG 112 10/12/2017    HDL 40 10/12/2017    LDLCALC 121 10/12/2017     Lab Results   Component Value Date    ALT 19 10/12/2017    AST 19 10/12/2017     Lab Results   Component Value Date    TSH 1.24 09/12/2017    TSH 2.27 01/04/2017    T4FREE 1.6 09/12/2017    T4FREE 1.7 06/29/2016     Lab Results   Component Value Date    WBC 4.9 10/12/2017    HGB 16.0 10/12/2017    HCT 49.8 (H) 10/12/2017    MCV 91.0 10/12/2017     10/12/2017     No results found for: INR   No results found for: PSA   No results found for: [facility]     Wt Readings from Last 3 Encounters:   10/18/18 237 lb (107.5 kg)   04/09/18 232 lb (105.2 kg)   10/12/17 225 lb (102.1 kg)     BP Readings from Last 3 Encounters:   10/18/18 122/78   04/09/18 138/82   10/12/17 115/82       Patient Active Problem List   Diagnosis    Gastroesophageal reflux disease without esophagitis    Obesity, Class II, BMI 35-39.9, with comorbidity    Acquired hypothyroidism    Essential hypertension    Stress and adjustment reaction       Allergies   Allergen Reactions    Sulfa Antibiotics Hives     Outpatient Prescriptions Marked as Taking for the 10/18/18 encounter (Office Visit) with Laura Louise MD   Medication Sig Dispense Refill    b complex vitamins capsule Take 1 capsule by mouth daily      Omega-3 Fatty Acids (FISH OIL) 1000 MG CAPS Take 3,000 mg by mouth every other day      metoprolol succinate (TOPROL XL) 100 MG extended release tablet Take 1 tablet by mouth daily 90 tablet 1    levothyroxine (SYNTHROID) 50 MCG tablet TAKE 1 TABLET BY MOUTH DAILY 90 tablet 1    omeprazole (PRILOSEC OTC) 20 MG tablet Take 1 tablet by mouth daily 90 tablet 1    Cholecalciferol (VITAMIN D3 PO) Take 500 mg by mouth daily      Calcium Carbonate-Vitamin D (CALCIUM + D PO) Take by mouth daily         Past Medical History:   Diagnosis Date    Acquired hypothyroidism 1/6/2016    Adjustment reaction with anxiety and depression 10/19/2015    Depression     Essential hypertension 10/19/2015    Gastroesophageal reflux disease without esophagitis 10/19/2015    Hiatal hernia     Hypertension     Obesity, Class II, BMI 35-39.9, with comorbidity 10/19/2015     Past Surgical History:   Procedure Laterality Date    COLONOSCOPY  2005    COLONOSCOPY      PARTIAL HYSTERECTOMY  2000    due to Fibroid     Family History   Problem Relation Age of Onset    Heart Disease Mother         A.  Fib    MCV 91.0 10/12/2017     10/12/2017     No results found for: INR   No results found for: PSA   No results found for: [facility]       Preventive Care:  Health Maintenance   Topic Date Due    Shingles Vaccine (1 of 2 - 2 Dose Series) 12/12/2017    TSH testing  09/12/2018    Potassium monitoring  10/12/2018    Creatinine monitoring  10/12/2018    Breast cancer screen  10/19/2018    Colon cancer screen colonoscopy  03/31/2021    DTaP/Tdap/Td vaccine (2 - Td) 01/01/2022    Lipid screen  10/12/2022    Flu vaccine  Completed    Hepatitis C screen  Completed    HIV screen  Completed      Hx abnormal PAP: no  Sexual activity: has sex with males   Last eye exam: 2017, normal  Exercise: no regular exercise  Seatbelt use: yes       Preventive plan of care for Elen Garvin        10/18/2018           Preventive Measures Status       Recommendations for screening   Colon Cancer Screen   Colonoscopy Test is due 2021   Breast Cancer Screen  Mammogram Repeat yearly   Cervical Cancer Screen   PAP smear: This test is not clinically indicated   Osteoporosis Screen   DEXA scan  This test is not clinically indicated   Diabetes Screen  Glucose (mg/dL)   Date Value   10/12/2017 94    Test recommended and ordered   Cholesterol Screen  Lab Results   Component Value Date    CHOL 183 10/12/2017    TRIG 112 10/12/2017    HDL 40 10/12/2017    LDLCALC 121 (H) 10/12/2017    Test recommended and ordered   Aspirin for Cardiovascular Prevention   No Indicated- Start 81 mg daily   Weight: Body mass index is 37.12 kg/m².   5' 7\" (1.702 m)237 lb (107.5 kg)    Your BMI is 25 or greater, which indicates that you are overweight   Living Will: Yes   Copy requested    Recommended Immunizations    Immunization History   Administered Date(s) Administered    Influenza Virus Vaccine 10/19/2015, 09/18/2018    Influenza, Frank Gay, 3 Years and older, IM (Fluzone 3 yrs and older or Afluria 5 yrs and older) 10/12/2017    Influenza, Frank Gay, 3 yrs and

## 2019-04-03 ENCOUNTER — OFFICE VISIT (OUTPATIENT)
Dept: DERMATOLOGY | Age: 64
End: 2019-04-03
Payer: COMMERCIAL

## 2019-04-03 DIAGNOSIS — L30.9 DERMATITIS: Primary | ICD-10-CM

## 2019-04-03 PROCEDURE — 99203 OFFICE O/P NEW LOW 30 MIN: CPT | Performed by: DERMATOLOGY

## 2019-04-03 RX ORDER — ASPIRIN 81 MG/1
81 TABLET ORAL DAILY
COMMUNITY
End: 2019-06-07 | Stop reason: ALTCHOICE

## 2019-04-03 RX ORDER — TRIAMCINOLONE ACETONIDE 1 MG/G
CREAM TOPICAL
Qty: 80 G | Refills: 2 | Status: SHIPPED | OUTPATIENT
Start: 2019-04-03 | End: 2019-05-22

## 2019-04-03 NOTE — LETTER
[facility]  [address] [place] [id number]  Phone: [phone]  Fax: [phone]    Jacinto Gregory MD        April 3, 2019     Lawanda Reid MD  [address] [place] [address]    Patient: Marylou Mccoy  MR Number: [id number]  YOB: 1955  Date of Visit: 4/3/2019    Dear Dr. Minnie Delacruz [facility]:    Thank you for the request for consultation for Malu Lopez to me for the evaluation of dermatitis. Below are the relevant portions of my assessment and plan of care. If you have questions, please do not hesitate to call me. I look forward to following Tiffanie Montes De Oca along with you.     Sincerely,        Jacinto Gregory MD

## 2019-04-03 NOTE — PROGRESS NOTES
[facility], [facility] Physicians    Previous clinic visit: none      CC:  rash on leg, hand, behind ears    HPI:    1.)  Here today with few year h/o itchy eruption behind ears, on base of neck, on palms of hands and on lower legs. No tx to date. Isn't sure if there is a h/o dermatitis in family or atopy. Believes there is a family h/o PsO in brother and possibly father. DERM HISTORY:   Personal history of NMSC or MM- no    Family history of NMSC or MM- no   Sunburns easily- Yes   Uses sunscreen- Yes  History of tanning bed use- No    ADDITIONAL HISTORY:    I have reviewed past medical and surgical histories, current medications, allergies, social and family histories as documented in the patient's electronic medical record.      Current Outpatient Medications   Medication Sig Dispense Refill    b complex vitamins capsule Take 1 capsule by mouth daily      Omega-3 Fatty Acids (FISH OIL) 1000 MG CAPS Take 3,000 mg by mouth every other day      metoprolol succinate (TOPROL XL) 100 MG extended release tablet Take 1 tablet by mouth daily 90 tablet 1    levothyroxine (SYNTHROID) 50 MCG tablet TAKE 1 TABLET BY MOUTH DAILY 90 tablet 1    omeprazole (PRILOSEC OTC) 20 MG tablet Take 1 tablet by mouth daily 90 tablet 1    dicyclomine (BENTYL) 10 MG capsule 1-2 qid prn cramps to prevent diarrhea 90 capsule 0    Biotin 3 MG TABS Take 3 mg by mouth daily      Cholecalciferol (VITAMIN D3 PO) Take 500 mg by mouth daily      Calcium Carbonate-Vitamin D (CALCIUM + D PO) Take by mouth daily      Melatonin 5 MG TABS tablet Take 5 mg by mouth daily      Polyethylene Glycol 3350 (MIRALAX PO) Take by mouth as needed       No current facility-administered medications for this visit.         ROS:    General: No fevers, chills, sweats, unexplained weight loss or weight gain, fatigue, malaise   Skin: Denies additional lesions    Heme: No history of bleeding diatheses    Allergy: Denies seasonal or environmental allergies or other medication allergies     PHYSICAL EXAM:    General: Well-appearing, NAD      Integument: Examination was performed of the following and were unremarkable except as otherwise noted below:psych/neuro, scalp, hair, face, ears, conjunctivae/eyelids, gums/teeth/lips, buccal mucosa, oropharynx, neck, breast/axilla/chest, abdomen, groin, back, buttocks, RUE, LUE, RLE, LLE, digits/nails. Genital exam deferred per patient.      Abnormalities noted include:    1.) Posterior auricular groove, lateral R neck, lower legs and palm of L hand with several erythematous variably sized focally lichenified eczematous patches      ASSESSMENT AND PLAN:    1.)  Subacute eczematous dermatitis, flaring and not tx:  - Start TAC 0.1% cream bid; edu: patient re: side effects of topical steroids, including: skin atrophy, telangiectasias, dyspigmentation with prolonged or inappropriate use   - Edu: patient regarding preferable skin care regimen, including: bathing/showering daily in lukewarm water using a mild cleanser such as Dove to only the face, feet, and intertriginous areas. Application of topical steroid followed by liberal amounts of ointments or emollients should immediately follow bathing (e.g., Vaseline/Aquaphor, water-in-oil creams). Return to Clinic:  4 weeks  Discussed plan with patient and/or primary caretaker. Patient to call clinic with any questions / concerns. Reviewed side effects of treatment(s) and/or medication(s) with patient and/or primary caretaker.    AVS provided or is available on [employer]   ____________________________________________________________________________   Sudeep Hope MD, MPH, FAAD  [facility], [address] [place] [address]

## 2019-04-18 ENCOUNTER — OFFICE VISIT (OUTPATIENT)
Dept: INTERNAL MEDICINE CLINIC | Age: 64
End: 2019-04-18
Payer: COMMERCIAL

## 2019-04-18 VITALS
DIASTOLIC BLOOD PRESSURE: 96 MMHG | HEIGHT: 67 IN | SYSTOLIC BLOOD PRESSURE: 142 MMHG | OXYGEN SATURATION: 99 % | WEIGHT: 243 LBS | HEART RATE: 98 BPM | BODY MASS INDEX: 38.14 KG/M2

## 2019-04-18 DIAGNOSIS — E03.9 ACQUIRED HYPOTHYROIDISM: ICD-10-CM

## 2019-04-18 DIAGNOSIS — I10 ESSENTIAL HYPERTENSION: Primary | ICD-10-CM

## 2019-04-18 DIAGNOSIS — K21.9 GASTROESOPHAGEAL REFLUX DISEASE WITHOUT ESOPHAGITIS: ICD-10-CM

## 2019-04-18 PROCEDURE — 99214 OFFICE O/P EST MOD 30 MIN: CPT | Performed by: INTERNAL MEDICINE

## 2019-04-18 RX ORDER — METOPROLOL SUCCINATE 100 MG/1
100 TABLET, EXTENDED RELEASE ORAL DAILY
Qty: 90 TABLET | Refills: 1 | Status: SHIPPED | OUTPATIENT
Start: 2019-04-18 | End: 2019-06-07

## 2019-04-18 RX ORDER — LOSARTAN POTASSIUM 50 MG/1
50 TABLET ORAL DAILY
Qty: 30 TABLET | Refills: 0 | Status: SHIPPED | OUTPATIENT
Start: 2019-04-18 | End: 2019-05-20 | Stop reason: ALTCHOICE

## 2019-04-18 RX ORDER — OMEPRAZOLE 20 MG/1
20 TABLET, DELAYED RELEASE ORAL DAILY
Qty: 90 TABLET | Refills: 1 | Status: SHIPPED | OUTPATIENT
Start: 2019-04-18

## 2019-04-18 RX ORDER — LEVOTHYROXINE SODIUM 0.05 MG/1
TABLET ORAL
Qty: 90 TABLET | Refills: 1 | Status: SHIPPED | OUTPATIENT
Start: 2019-04-18 | End: 2020-01-03 | Stop reason: SDUPTHER

## 2019-04-18 ASSESSMENT — PATIENT HEALTH QUESTIONNAIRE - PHQ9
SUM OF ALL RESPONSES TO PHQ QUESTIONS 1-9: 0
SUM OF ALL RESPONSES TO PHQ QUESTIONS 1-9: 0
1. LITTLE INTEREST OR PLEASURE IN DOING THINGS: 0
2. FEELING DOWN, DEPRESSED OR HOPELESS: 0
SUM OF ALL RESPONSES TO PHQ9 QUESTIONS 1 & 2: 0

## 2019-04-18 NOTE — PROGRESS NOTES
Josep Jarrett  YOB: 1955    Date of Service:  4/18/2019    Chief Complaint:      Chief Complaint   Patient presents with    Hypothyroidism    Hypertension       HPI:  Josep Jarrett is a 61 y.o. Hypertension: Home blood pressure monitoring: Yes - 140-170/80's for couple of months on Metoprolol succinate (toprol XL) 100 mg qd. She is under a lot of stress from work. She is adherent to a low sodium diet. Patient denies chest pain, shortness of breath, lightheadedness, blurred vision, peripheral edema, palpitations, dry cough and fatigue. Antihypertensive medication side effects: no medication side effects noted. Use of agents associated with hypertension: none. Hypothyroid: Recent symptoms: none on Synthrid 50 mcg qd. She denies fatigue, constipation, weight gain, weight loss, cold intolerance, heat intolerance, hair loss, dry skin, diarrhea, edema, anxiety, tremor, palpitations and dysphagia. Patient is taking her medication consistently on an empty stomach. GERD: stable on Prilosec 20 mg qd prn twice a month. .  Diarrhea improved with eating yogurt    Lab Results   Component Value Date    LABA1C 6.5 10/12/2017    LABA1C 5.7 10/05/2016     Lab Results   Component Value Date     10/18/2018    K 4.6 10/18/2018    CL 99 10/18/2018    CO2 26 10/18/2018    BUN 19 10/18/2018    CREATININE 0.8 10/18/2018    GLUCOSE 114 (H) 10/18/2018    CALCIUM 9.8 10/18/2018     Lab Results   Component Value Date    CHOL 156 10/18/2018    TRIG 74 10/18/2018    HDL 40 10/18/2018    LDLCALC 101 10/18/2018     Lab Results   Component Value Date    ALT 20 10/18/2018    AST 19 10/18/2018     Lab Results   Component Value Date    TSH 2.59 10/18/2018    TSH 1.24 09/12/2017    T4FREE 1.6 09/12/2017    T4FREE 1.7 06/29/2016     Lab Results   Component Value Date    WBC 5.1 10/18/2018    HGB 15.7 10/18/2018    HCT 47.5 10/18/2018    MCV 90.3 10/18/2018     10/18/2018     No results found for: INR   No results found for: PSA   No results found for: [facility]     Patient Active Problem List   Diagnosis    Gastroesophageal reflux disease without esophagitis    Obesity, Class II, BMI 35-39.9, with comorbidity    Acquired hypothyroidism    Essential hypertension    Stress and adjustment reaction    Dermatitis       Allergies   Allergen Reactions    Sulfa Antibiotics Hives     Outpatient Medications Marked as Taking for the 4/18/19 encounter (Office Visit) with Kailyn Louise MD   Medication Sig Dispense Refill    aspirin 81 MG EC tablet Take 81 mg by mouth daily      triamcinolone (KENALOG) 0.1 % cream Apply to affected area twice daily until improved. 80 g 2    b complex vitamins capsule Take 1 capsule by mouth daily      Omega-3 Fatty Acids (FISH OIL) 1000 MG CAPS Take 3,000 mg by mouth every other day      metoprolol succinate (TOPROL XL) 100 MG extended release tablet Take 1 tablet by mouth daily 90 tablet 1    levothyroxine (SYNTHROID) 50 MCG tablet TAKE 1 TABLET BY MOUTH DAILY 90 tablet 1    omeprazole (PRILOSEC OTC) 20 MG tablet Take 1 tablet by mouth daily 90 tablet 1    Biotin 3 MG TABS Take 3 mg by mouth daily      Cholecalciferol (VITAMIN D3 PO) Take 500 mg by mouth daily      Calcium Carbonate-Vitamin D (CALCIUM + D PO) Take by mouth daily           Review of Systems: 14 systems were negative except of what was stated on HPI    Nursing note and vitals reviewed. Vitals:    04/18/19 0739   BP: (!) 142/96   Pulse: 98   SpO2: 99%   Weight: 243 lb (110.2 kg)   Height: 5' 7\" (1.702 m)     Wt Readings from Last 3 Encounters:   04/18/19 243 lb (110.2 kg)   10/18/18 237 lb (107.5 kg)   04/09/18 232 lb (105.2 kg)     BP Readings from Last 3 Encounters:   04/18/19 (!) 142/96   10/18/18 122/78   04/09/18 138/82     Body mass index is 38.06 kg/m². Constitutional: Patient appears well-developed and well-nourished. No distress. Head: Normocephalic and atraumatic. Neck: Normal range of motion. Neck supple. No thyroidmegaly. Cardiovascular: Normal rate, regular rhythm, normal heart sounds and intact distal pulses. Pulmonary/Chest: Effort normal and breath sounds normal. No stridor. No respiratory distress. No wheezes and no rales. Abdominal: Soft. Bowel sounds are normal. No distension and no mass. No tenderness. No rebound and no guarding. Musculoskeletal: No edema and no tenderness. Skin: No rash or erythema. Psychiatric: Normal mood and affect. Behavior is normal.     Assessment/Plan:  Tiffanie Montes De Oca was seen today for hypothyroidism and hypertension. Diagnoses and all orders for this visit:    Essential hypertension  Start  losartan (COZAAR) 50 MG tablet; Take 1 tablet by mouth daily  -     metoprolol succinate (TOPROL XL) 100 MG extended release tablet; Take 1 tablet by mouth daily    Acquired hypothyroidism  -     levothyroxine (SYNTHROID) 50 MCG tablet; TAKE 1 TABLET BY MOUTH DAILY    Gastroesophageal reflux disease without esophagitis  -     omeprazole (PRILOSEC OTC) 20 MG tablet; Take 1 tablet by mouth daily        Return 1 month on BP.

## 2019-04-25 ENCOUNTER — TELEPHONE (OUTPATIENT)
Dept: FAMILY MEDICINE CLINIC | Age: 64
End: 2019-04-25

## 2019-04-25 NOTE — TELEPHONE ENCOUNTER
FYI  --  I called patient and LM regarding an appointment for the mammogram Maira Alegria.   Give to [employer]

## 2019-05-01 ENCOUNTER — TELEPHONE (OUTPATIENT)
Dept: INTERNAL MEDICINE CLINIC | Age: 64
End: 2019-05-01

## 2019-05-01 NOTE — TELEPHONE ENCOUNTER
Called & informed patient if she is taking 1 1/2 of metoprolol 100mg, she can increase to 2 pills a day.     Advised to continue to monitor & if no improvement to move appointment sooner

## 2019-05-16 ENCOUNTER — OFFICE VISIT (OUTPATIENT)
Dept: INTERNAL MEDICINE CLINIC | Age: 64
End: 2019-05-16
Payer: COMMERCIAL

## 2019-05-16 VITALS
HEART RATE: 45 BPM | HEIGHT: 67 IN | DIASTOLIC BLOOD PRESSURE: 86 MMHG | WEIGHT: 247 LBS | OXYGEN SATURATION: 99 % | BODY MASS INDEX: 38.77 KG/M2 | SYSTOLIC BLOOD PRESSURE: 144 MMHG

## 2019-05-16 DIAGNOSIS — E03.9 ACQUIRED HYPOTHYROIDISM: ICD-10-CM

## 2019-05-16 DIAGNOSIS — K21.9 GASTROESOPHAGEAL REFLUX DISEASE WITHOUT ESOPHAGITIS: ICD-10-CM

## 2019-05-16 DIAGNOSIS — R06.09 DOE (DYSPNEA ON EXERTION): ICD-10-CM

## 2019-05-16 DIAGNOSIS — I10 ESSENTIAL HYPERTENSION: Primary | ICD-10-CM

## 2019-05-16 PROCEDURE — 99214 OFFICE O/P EST MOD 30 MIN: CPT | Performed by: INTERNAL MEDICINE

## 2019-05-16 RX ORDER — LOSARTAN POTASSIUM AND HYDROCHLOROTHIAZIDE 25; 100 MG/1; MG/1
1 TABLET ORAL DAILY
Qty: 30 TABLET | Refills: 0 | Status: SHIPPED | OUTPATIENT
Start: 2019-05-16 | End: 2019-06-10 | Stop reason: DRUGHIGH

## 2019-05-16 NOTE — PROGRESS NOTES
Yaima Cullen  YOB: 1955    Date of Service:  5/16/2019    Chief Complaint:      Chief Complaint   Patient presents with    Hypertension     follow up        HPI:  Yaima Cullen is a 61 y.o. She complains of increase TATUM for a few years. She has steadily gained weight every year with lack of exercise and have gained 10 # in the past 6 months. She's concern about her heart and would like to see a cardilogist.  She does have h/o snoring and been getting 6 hrs/sleep every night. Hypertension: Home blood pressure monitoring: Yes - 140-160/82-90's on Metoprolol succinate (toprol XL) 100 2 mg qd and Losartan 50 mg qd. She is under a lot of stress from work. She is adherent to a low sodium diet. Patient denies chest pain, shortness of breath, lightheadedness, blurred vision, peripheral edema, palpitations, dry cough and fatigue. Antihypertensive medication side effects: no medication side effects noted. Use of agents associated with hypertension: none. Hypothyroid: Recent symptoms: none on Synthrid 50 mcg qd. She denies fatigue, constipation, weight gain, weight loss, cold intolerance, heat intolerance, hair loss, dry skin, diarrhea, edema, anxiety, tremor, palpitations and dysphagia. Patient is taking her medication consistently on an empty stomach. GERD: stable on Prilosec 20 mg qd prn twice a month. .      Lab Results   Component Value Date    LABA1C 6.5 10/12/2017    LABA1C 5.7 10/05/2016     Lab Results   Component Value Date     10/18/2018    K 4.6 10/18/2018    CL 99 10/18/2018    CO2 26 10/18/2018    BUN 19 10/18/2018    CREATININE 0.8 10/18/2018    GLUCOSE 114 (H) 10/18/2018    CALCIUM 9.8 10/18/2018     Lab Results   Component Value Date    CHOL 156 10/18/2018    TRIG 74 10/18/2018    HDL 40 10/18/2018    LDLCALC 101 10/18/2018     Lab Results   Component Value Date    ALT 20 10/18/2018    AST 19 10/18/2018     Lab Results   Component Value Date    TSH 2.59 10/18/2018    TSH 1.24 09/12/2017    T4FREE 1.6 09/12/2017    T4FREE 1.7 06/29/2016     Lab Results   Component Value Date    WBC 5.1 10/18/2018    HGB 15.7 10/18/2018    HCT 47.5 10/18/2018    MCV 90.3 10/18/2018     10/18/2018     No results found for: INR   No results found for: PSA   No results found for: [facility]     Patient Active Problem List   Diagnosis    Gastroesophageal reflux disease without esophagitis    Obesity, Class II, BMI 35-39.9, with comorbidity    Acquired hypothyroidism    Essential hypertension    Stress and adjustment reaction    Dermatitis       Allergies   Allergen Reactions    Sulfa Antibiotics Hives     Outpatient Medications Marked as Taking for the 5/16/19 encounter (Office Visit) with Lawrence Horton MD   Medication Sig Dispense Refill    losartan (COZAAR) 50 MG tablet Take 1 tablet by mouth daily 30 tablet 0    metoprolol succinate (TOPROL XL) 100 MG extended release tablet Take 1 tablet by mouth daily 90 tablet 1    levothyroxine (SYNTHROID) 50 MCG tablet TAKE 1 TABLET BY MOUTH DAILY 90 tablet 1    omeprazole (PRILOSEC OTC) 20 MG tablet Take 1 tablet by mouth daily 90 tablet 1    aspirin 81 MG EC tablet Take 81 mg by mouth daily      triamcinolone (KENALOG) 0.1 % cream Apply to affected area twice daily until improved. 80 g 2    b complex vitamins capsule Take 1 capsule by mouth daily      Omega-3 Fatty Acids (FISH OIL) 1000 MG CAPS Take 3,000 mg by mouth every other day      Biotin 3 MG TABS Take 3 mg by mouth daily      Cholecalciferol (VITAMIN D3 PO) Take 500 mg by mouth daily           Review of Systems: 14 systems were negative except of what was stated on HPI    Nursing note and vitals reviewed.     Vitals:    05/16/19 0748   BP: (!) 144/86   Pulse: (!) 45   SpO2: 99%   Weight: 247 lb (112 kg)   Height: 5' 7\" (1.702 m)     Wt Readings from Last 3 Encounters:   05/16/19 247 lb (112 kg)   04/18/19 243 lb (110.2 kg)   10/18/18 237 lb (107.5 kg)     BP Readings from Last 3 Encounters:   05/16/19 (!) 144/86   04/18/19 (!) 142/96   10/18/18 122/78     Body mass index is 38.69 kg/m². Constitutional: Patient appears well-developed and well-nourished. No distress. Head: Normocephalic and atraumatic. Neck: Normal range of motion. Neck supple. No thyroidmegaly. Cardiovascular: Normal rate, regular rhythm, normal heart sounds and intact distal pulses. Pulmonary/Chest: Effort normal and breath sounds normal. No stridor. No respiratory distress. No wheezes and no rales. Abdominal: Soft. Bowel sounds are normal. No distension and no mass. No tenderness. No rebound and no guarding. Musculoskeletal: No edema and no tenderness. Skin: No rash or erythema. Psychiatric: Normal mood and affect. Behavior is normal.     Assessment/Plan:  Mahendra Su was seen today for hypertension. Diagnoses and all orders for this visit:    Essential hypertension  Increase losartan-hydrochlorothiazide (HYZAAR) 100-25 MG per tablet; Take 1 tablet by mouth daily  -     Marcela Patel MD, Cardiology, [facility]  Decrease Toprol  mg qd    TATUM (dyspnea on exertion)  -     Marcela Patel MD, Cardiology, [facility]  Consider sleep study if persistent    Acquired hypothyroidism  Continue current med    Gastroesophageal reflux disease without esophagitis        Return 1 month on BP.

## 2019-05-20 ENCOUNTER — OFFICE VISIT (OUTPATIENT)
Dept: CARDIOLOGY CLINIC | Age: 64
End: 2019-05-20
Payer: COMMERCIAL

## 2019-05-20 VITALS
OXYGEN SATURATION: 96 % | HEIGHT: 67 IN | WEIGHT: 243 LBS | BODY MASS INDEX: 38.14 KG/M2 | SYSTOLIC BLOOD PRESSURE: 146 MMHG | HEART RATE: 49 BPM | DIASTOLIC BLOOD PRESSURE: 86 MMHG

## 2019-05-20 DIAGNOSIS — R06.09 DOE (DYSPNEA ON EXERTION): ICD-10-CM

## 2019-05-20 DIAGNOSIS — R94.31 ABNORMAL EKG: ICD-10-CM

## 2019-05-20 DIAGNOSIS — I10 ESSENTIAL HYPERTENSION: ICD-10-CM

## 2019-05-20 DIAGNOSIS — R06.02 SOB (SHORTNESS OF BREATH): Primary | ICD-10-CM

## 2019-05-20 DIAGNOSIS — G47.30 SLEEP APNEA, UNSPECIFIED TYPE: ICD-10-CM

## 2019-05-20 PROCEDURE — 99214 OFFICE O/P EST MOD 30 MIN: CPT | Performed by: INTERNAL MEDICINE

## 2019-05-20 PROCEDURE — 93000 ELECTROCARDIOGRAM COMPLETE: CPT | Performed by: INTERNAL MEDICINE

## 2019-05-20 NOTE — LETTER
father, maternal grandmother, and mother; Kidney Disease in her brother; Aleksandr Roque / Kinza Olivas in her sister; Other in her father, paternal grandmother, paternal uncle, and sister; Stroke in her maternal grandmother. Home Medications:  Prior to Admission medications    Medication Sig Start Date End Date Taking? Authorizing Provider   losartan-hydrochlorothiazide (HYZAAR) 100-25 MG per tablet Take 1 tablet by mouth daily 5/16/19  Yes Laltia Louise MD   losartan (COZAAR) 50 MG tablet Take 1 tablet by mouth daily 4/18/19  Yes Michelle Louise MD   metoprolol succinate (TOPROL XL) 100 MG extended release tablet Take 1 tablet by mouth daily 4/18/19  Yes Michelle Louise MD   levothyroxine (SYNTHROID) 50 MCG tablet TAKE 1 TABLET BY MOUTH DAILY 4/18/19  Yes Michelle Louise MD   omeprazole (PRILOSEC OTC) 20 MG tablet Take 1 tablet by mouth daily 4/18/19  Yes Michelle Louise MD   aspirin 81 MG EC tablet Take 81 mg by mouth daily   Yes Historical Provider, MD   triamcinolone (KENALOG) 0.1 % cream Apply to affected area twice daily until improved.  4/3/19  Yes Elva Mendez MD   b complex vitamins capsule Take 1 capsule by mouth daily   Yes Historical Provider, MD   Omega-3 Fatty Acids (FISH OIL) 1000 MG CAPS Take 3,000 mg by mouth every other day   Yes Historical Provider, MD   dicyclomine (BENTYL) 10 MG capsule 1-2 qid prn cramps to prevent diarrhea 9/12/17  Yes Lalita Louise MD   Biotin 3 MG TABS Take 3 mg by mouth daily   Yes Historical Provider, MD   Cholecalciferol (VITAMIN D3 PO) Take 500 mg by mouth daily   Yes Historical Provider, MD   Calcium Carbonate-Vitamin D (CALCIUM + D PO) Take by mouth daily   Yes Historical Provider, MD   Melatonin 5 MG TABS tablet Take 5 mg by mouth daily   Yes Historical Provider, MD   Polyethylene Glycol 3350 (MIRALAX PO) Take by mouth as needed   Yes Historical Provider, MD        Allergies:  Sulfa antibiotics     Review of Systems: · Constitutional: there has been no unanticipated weight loss. There's been no change in energy level, sleep pattern, or activity level. · Eyes: No visual changes or diplopia. No scleral icterus. · ENT: No Headaches, hearing loss or vertigo. No mouth sores or sore throat. · Cardiovascular: Reviewed in HPI  · Respiratory: No cough or wheezing, no sputum production. No hematemesis. · Gastrointestinal: No abdominal pain, appetite loss, blood in stools. No change in bowel or bladder habits. · Genitourinary: No dysuria, trouble voiding, or hematuria. · Musculoskeletal:  No gait disturbance, weakness or joint complaints. · Integumentary: No rash or pruritis. · Neurological: No headache, diplopia, change in muscle strength, numbness or tingling. No change in gait, balance, coordination, mood, affect, memory, mentation, behavior. · Psychiatric: No anxiety, no depression. · Endocrine: No malaise, fatigue or temperature intolerance. No excessive thirst, fluid intake, or urination. No tremor. · Hematologic/Lymphatic: No abnormal bruising or bleeding, blood clots or swollen lymph nodes. · Allergic/Immunologic: No nasal congestion or hives. Physical Examination:    Vitals:    05/20/19 1233   BP: (!) 146/86   Pulse:    SpO2:         Constitutional and General Appearance: NAD   Respiratory:  · Normal excursion and expansion without use of accessory muscles  · Resp Auscultation: Normal breath sounds without dullness  Cardiovascular:  · The apical impulses not displaced  · Heart tones are crisp and normal  · Cervical veins are not engorged  · The carotid upstroke is normal in amplitude and contour without delay or bruit  · Normal S1S2, No S3, No Murmur  · Peripheral pulses are symmetrical and full  · There is no clubbing, cyanosis of the extremities.   · No edema  · Femoral Arteries: 2+ and equal  · Pedal Pulses: 2+ and equal   Abdomen:  · No masses or tenderness  · Liver/Spleen: No Abnormalities Noted Neurological/Psychiatric:  · Alert and oriented in all spheres  · Moves all extremities well  · Exhibits normal gait balance and coordination  · No abnormalities of mood, affect, memory, mentation, or behavior are noted      Assessment:   TATUM - possible cardiac  Abnormal EKG  HTN - suboptimal. HCTZ added 1 week ago by PCP. Will monitor for another 2 weeks before making any changes  Bradycardia - exacerbated by BBlk  Fatigue - suspect sleep apnea    Plan:  Decrease Metoprolol to 50 mg daily   Check blood pressure at home daily. Keep a log with the date and time. If blood pressure remains elevated we will start Norvasc   Stress echocardiogram   Referral for sleep study   Regular exercise and following a healthy diet encouraged   Follow up with NP in 2-3 weeks     Scribe's attestation: This note was scribed in the presence of Dr. Singh Rodriguez M.D. By Yoan Whitten RN        Thank you for allowing me to participate in the care of this individual.    The scribes docuementation has been prepared under my direction and personally reviewed by me in its entirety. I confirm that the note above accurately reflects all work, treatment, procedures, and medical decision making performed by me. MD Jing Gonsalez.  Elijah Child M.D., Angi Elliott

## 2019-05-20 NOTE — PATIENT INSTRUCTIONS
Plan:  Decrease Metoprolol to 50 mg daily   Check blood pressure at home daily. Keep a log with the date and time.  If blood pressure remains elevated we will start Norvasc   Stress echocardiogram   Referral for sleep study   Regular exercise and following a healthy diet encouraged   Follow up with NP in 2-3 weeks

## 2019-05-20 NOTE — PROGRESS NOTES
[address]   Cardiac Consultation    Referring Provider:  Kaylah Wolfe MD     Chief Complaint   Patient presents with    New Patient    Hypertension    Neck Pain    Edema     feet    Shortness of Breath     geting worse        History of Present Illness:  NEW patient for interventional cardiology, referred by PCP for TATUM, abnormal EKG, and HTN. Today she states she has been going to her PCP for hypertension. She was changed to losartan-hydrochlorothiazide 1 week ago. She states she has no energy. Snores some. She states she neck pain that is not new. She has SOB with walking up stairs. She has no associated chest pain. She has to stop due to her SOB which can last for 15- 20 minutes. SOB resolved with rest. Increased past few months. She states she has gained weight over the last 3 years since her  . She has not been checking her blood pressure at home. Patient currently denies any weight gain, edema, palpitations, chest pain, dizziness, and syncope. Past Medical History:   has a past medical history of Acquired hypothyroidism, Adjustment reaction with anxiety and depression, Depression, Essential hypertension, Gastroesophageal reflux disease without esophagitis, Hiatal hernia, Hypertension, and Obesity, Class II, BMI 35-39.9, with comorbidity. Surgical History:   has a past surgical history that includes partial hysterectomy (cervix not removed) (); Colonoscopy (); and Colonoscopy. Social History:   reports that she has never smoked. She has never used smokeless tobacco. She reports that she does not drink alcohol or use drugs.      Family History:  family history includes Alzheimer's Disease in her mother; Arthritis in her father; Cirrhosis in her paternal grandmother; Diabetes in her paternal aunt and paternal grandfather; Early Death (age of onset: 47) in her maternal grandfather; Hearing Loss in her father; Heart Disease in her father, maternal grandmother, and mother; Kidney Disease in her brother; Mely Gr / Ramos Lunger in her sister; Other in her father, paternal grandmother, paternal uncle, and sister; Stroke in her maternal grandmother. Home Medications:  Prior to Admission medications    Medication Sig Start Date End Date Taking? Authorizing Provider   losartan-hydrochlorothiazide (HYZAAR) 100-25 MG per tablet Take 1 tablet by mouth daily 5/16/19  Yes Mike Louise MD   losartan (COZAAR) 50 MG tablet Take 1 tablet by mouth daily 4/18/19  Yes Michelle Louise MD   metoprolol succinate (TOPROL XL) 100 MG extended release tablet Take 1 tablet by mouth daily 4/18/19  Yes Michelle Louise MD   levothyroxine (SYNTHROID) 50 MCG tablet TAKE 1 TABLET BY MOUTH DAILY 4/18/19  Yes Michelle Louise MD   omeprazole (PRILOSEC OTC) 20 MG tablet Take 1 tablet by mouth daily 4/18/19  Yes Michelle Louise MD   aspirin 81 MG EC tablet Take 81 mg by mouth daily   Yes Historical Provider, MD   triamcinolone (KENALOG) 0.1 % cream Apply to affected area twice daily until improved. 4/3/19  Yes Nga Ly MD   b complex vitamins capsule Take 1 capsule by mouth daily   Yes Historical Provider, MD   Omega-3 Fatty Acids (FISH OIL) 1000 MG CAPS Take 3,000 mg by mouth every other day   Yes Historical Provider, MD   dicyclomine (BENTYL) 10 MG capsule 1-2 qid prn cramps to prevent diarrhea 9/12/17  Yes Mike Louise MD   Biotin 3 MG TABS Take 3 mg by mouth daily   Yes Historical Provider, MD   Cholecalciferol (VITAMIN D3 PO) Take 500 mg by mouth daily   Yes Historical Provider, MD   Calcium Carbonate-Vitamin D (CALCIUM + D PO) Take by mouth daily   Yes Historical Provider, MD   Melatonin 5 MG TABS tablet Take 5 mg by mouth daily   Yes Historical Provider, MD   Polyethylene Glycol 3350 (MIRALAX PO) Take by mouth as needed   Yes Historical Provider, MD        Allergies:  Sulfa antibiotics     Review of Systems:   · Constitutional: there has been no unanticipated weight loss.  There's been no change in energy level, sleep pattern, or activity level. · Eyes: No visual changes or diplopia. No scleral icterus. · ENT: No Headaches, hearing loss or vertigo. No mouth sores or sore throat. · Cardiovascular: Reviewed in HPI  · Respiratory: No cough or wheezing, no sputum production. No hematemesis. · Gastrointestinal: No abdominal pain, appetite loss, blood in stools. No change in bowel or bladder habits. · Genitourinary: No dysuria, trouble voiding, or hematuria. · Musculoskeletal:  No gait disturbance, weakness or joint complaints. · Integumentary: No rash or pruritis. · Neurological: No headache, diplopia, change in muscle strength, numbness or tingling. No change in gait, balance, coordination, mood, affect, memory, mentation, behavior. · Psychiatric: No anxiety, no depression. · Endocrine: No malaise, fatigue or temperature intolerance. No excessive thirst, fluid intake, or urination. No tremor. · Hematologic/Lymphatic: No abnormal bruising or bleeding, blood clots or swollen lymph nodes. · Allergic/Immunologic: No nasal congestion or hives. Physical Examination:    Vitals:    05/20/19 1233   BP: (!) 146/86   Pulse:    SpO2:         Constitutional and General Appearance: NAD   Respiratory:  · Normal excursion and expansion without use of accessory muscles  · Resp Auscultation: Normal breath sounds without dullness  Cardiovascular:  · The apical impulses not displaced  · Heart tones are crisp and normal  · Cervical veins are not engorged  · The carotid upstroke is normal in amplitude and contour without delay or bruit  · Normal S1S2, No S3, No Murmur  · Peripheral pulses are symmetrical and full  · There is no clubbing, cyanosis of the extremities.   · No edema  · Femoral Arteries: 2+ and equal  · Pedal Pulses: 2+ and equal   Abdomen:  · No masses or tenderness  · Liver/Spleen: No Abnormalities Noted  Neurological/Psychiatric:  · Alert and oriented in all spheres  · Moves all extremities well  · Exhibits normal gait balance and coordination  · No abnormalities of mood, affect, memory, mentation, or behavior are noted      Assessment:   TATUM - possible cardiac  Abnormal EKG  HTN - suboptimal. HCTZ added 1 week ago by PCP. Will monitor for another 2 weeks before making any changes  Bradycardia - exacerbated by BBlk  Fatigue - suspect sleep apnea    Plan:  Decrease Metoprolol to 50 mg daily   Check blood pressure at home daily. Keep a log with the date and time. If blood pressure remains elevated we will start Norvasc   Stress echocardiogram   Referral for sleep study   Regular exercise and following a healthy diet encouraged   Follow up with NP in 2-3 weeks     Scribe's attestation: This note was scribed in the presence of Dr. Natty Cleary M.D. By Kim Ross RN        Thank you for allowing me to participate in the care of this individual.    The scribes docuementation has been prepared under my direction and personally reviewed by me in its entirety. I confirm that the note above accurately reflects all work, treatment, procedures, and medical decision making performed by me. MD Clarice Marie.  Sandy Gomez M.D., Soniya Kay

## 2019-05-22 ENCOUNTER — OFFICE VISIT (OUTPATIENT)
Dept: PULMONOLOGY | Age: 64
End: 2019-05-22
Payer: COMMERCIAL

## 2019-05-22 VITALS
TEMPERATURE: 98 F | HEART RATE: 51 BPM | DIASTOLIC BLOOD PRESSURE: 86 MMHG | OXYGEN SATURATION: 97 % | SYSTOLIC BLOOD PRESSURE: 168 MMHG | BODY MASS INDEX: 38.14 KG/M2 | HEIGHT: 67 IN | RESPIRATION RATE: 16 BRPM | WEIGHT: 243 LBS

## 2019-05-22 DIAGNOSIS — R06.83 SNORING: Primary | ICD-10-CM

## 2019-05-22 DIAGNOSIS — E66.9 OBESITY (BMI 30-39.9): ICD-10-CM

## 2019-05-22 DIAGNOSIS — R53.83 OTHER FATIGUE: ICD-10-CM

## 2019-05-22 DIAGNOSIS — R29.818 SUSPECTED SLEEP APNEA: ICD-10-CM

## 2019-05-22 DIAGNOSIS — I10 ESSENTIAL HYPERTENSION: ICD-10-CM

## 2019-05-22 PROCEDURE — 99243 OFF/OP CNSLTJ NEW/EST LOW 30: CPT | Performed by: NURSE PRACTITIONER

## 2019-05-22 ASSESSMENT — SLEEP AND FATIGUE QUESTIONNAIRES
ESS TOTAL SCORE: 1
HOW LIKELY ARE YOU TO NOD OFF OR FALL ASLEEP IN A CAR, WHILE STOPPED FOR A FEW MINUTES IN TRAFFIC: 0
HOW LIKELY ARE YOU TO NOD OFF OR FALL ASLEEP WHILE SITTING INACTIVE IN A PUBLIC PLACE: 0
HOW LIKELY ARE YOU TO NOD OFF OR FALL ASLEEP WHILE SITTING AND TALKING TO SOMEONE: 0
NECK CIRCUMFERENCE (INCHES): 15.75
HOW LIKELY ARE YOU TO NOD OFF OR FALL ASLEEP WHILE SITTING QUIETLY AFTER LUNCH WITHOUT ALCOHOL: 0
HOW LIKELY ARE YOU TO NOD OFF OR FALL ASLEEP WHEN YOU ARE A PASSENGER IN A CAR FOR AN HOUR WITHOUT A BREAK: 0
HOW LIKELY ARE YOU TO NOD OFF OR FALL ASLEEP WHILE SITTING AND READING: 0
HOW LIKELY ARE YOU TO NOD OFF OR FALL ASLEEP WHILE WATCHING TV: 0
HOW LIKELY ARE YOU TO NOD OFF OR FALL ASLEEP WHILE LYING DOWN TO REST IN THE AFTERNOON WHEN CIRCUMSTANCES PERMIT: 1

## 2019-05-22 NOTE — PATIENT INSTRUCTIONS
something soothing for 15 minutes, have a cup of caffeine free tea, or do relaxation exercises such as yoga or deep breathing help relieve anxiety and reduce muscle tension. 8- Fix a bedtime and an awakening time: Even on weekends! When your sleep cycle has a regular rhythm, you will feel better. 9- Sleep only when sleepy: This reduces the time you are awake in bed. 10- Get into your favorite sleeping position: If you can't fall asleep within 15-30 minutes, get up and do something boring until you feel sleepy. Sit quietly in the dark or read the warranty on your refrigerator. Don't expose yourself to bright light while you are up, it gives cues to your brain that it is time to wake up. 11- Only use your bed for sleeping: Dont use the bed as an office, workroom or recreation room. Let your body \"know\" that the bed is associated with sleeping  12- Use comfortable bedding. Uncomfortable bedding can prevent good sleep. Evaluate whether or not this is a source of your problem, and make appropriate changes. 13- Make sure your bed and bedroom are quiet and comfortable: A hot room can be uncomfortable. A cooler room, along with enough blankets to stay warm is recommended. Get a blackout shade or wear a slumber mask and wear earplugs or get a \"white noise\" machine for light and noise distractions. 14- Use sunlight to set your biological clock: When you get up in the morning, go outside and turn your face to the sun for 15 minutes. 13- Dont take your worries to bed: Leave worries about job, school, daily life, etc., behind when you go to bed. Some people find it useful to assign a \"worry period\" during the evening or afternoon for these issues.

## 2019-05-22 NOTE — PROGRESS NOTES
Patient ID: Opal Watt is a 61 y.o. female who is being seen today for   Chief Complaint   Patient presents with    New Patient     snoring, fatigue     Referring: Dr. Maulik Valdez    HPI:   Opal Watt is a 61 y.o. female in office for sleep apnea evaluation. Patient reports snoring at night for the past 10 years. Worse in supine position. Occasionally wakes self snoring. Unsure if witnessed apnea, sleeps alone. Wakes up at night choking and gasping for air. No restorative sleep. No dry mouth upon awakening. Fatigue and tiredness during the day. No history of sleep apnea. Bedtime 10 pm and rise time is 7 am. It takes few minutes to fall asleep- plays on phone until gets sleepy then right to sleep. No TV in bedroom. 1 nocturia. Wakes up 1 times at night. It takes few minutes to fall back a sleep. Takes No nap during the day. Occasional headache in am. No car wrecks or near wrecks because of the sleepiness. No nodding off while driving. Gained 70 pounds in the past 9 years. No forgetfulness or decreased concentration. No nasal congestion at night. Drinks 2 caffinated beverages per day. No significant alcohol. No restless feelings in legs at night. No teeth grinding, maybe some clentching. No nightmares. No sleep walking. No night time panic attacks. No narcotics. No drug abuse. +history of depression. +history of anxiety. No history of atrial fibrillation. No history of DM. +history of HTN. No history of ischemic heart disease. No history of stroke. ESS is 1. No smoking. No FH for RLS. +FH for VANESSA- dad. States son has narcolepsy. Blood pressure is elevated in office today. Denies CP, vision change or Headache.    States she is working with PCP and cardiology    Sleep Medicine 5/22/2019   Sitting and reading 0   Watching TV 0   Sitting, inactive in a public place (e.g. a theatre or a meeting) 0   As a passenger in a car for an hour without a break 0   Lying down to rest in the afternoon when circumstances permit 1   Sitting and talking to someone 0   Sitting quietly after a lunch without alcohol 0   In a car, while stopped for a few minutes in traffic 0   Total score 1   Neck circumference 15.75       Past Medical History:  Past Medical History:   Diagnosis Date    Acquired hypothyroidism 1/6/2016    Adjustment reaction with anxiety and depression 10/19/2015    Depression     Essential hypertension 10/19/2015    Gastroesophageal reflux disease without esophagitis 10/19/2015    Hiatal hernia     Hypertension     Obesity, Class II, BMI 35-39.9, with comorbidity 10/19/2015       Past Surgical History:        Procedure Laterality Date    COLONOSCOPY  2005    COLONOSCOPY      PARTIAL HYSTERECTOMY  2000    due to Fibroid       Allergies:  is allergic to sulfa antibiotics. Social History:    TOBACCO:   reports that she has never smoked. She has never used smokeless tobacco.  ETOH:   reports that she does not drink alcohol. Family History:       Problem Relation Age of Onset    Heart Disease Mother         A.  Fib    Alzheimer's Disease Mother     Arthritis Father     Hearing Loss Father     Heart Disease Father     Other Father         Alpha 1 antitrypson deficiency    Miscarriages / [place] Sister     Other Sister         MS and Alpha 1 antitrypson deficiency    Kidney Disease Brother         Kidney stone    Stroke Maternal Grandmother     Heart Disease Maternal Grandmother     Diabetes Paternal Grandfather     Diabetes Paternal Aunt     Other Paternal Uncle         Alpha 1 antitrypson deficiency    Early Death Maternal Grandfather 47    Other Paternal Grandmother         Alpha 1 antitrypson deficiency    Cirrhosis Paternal Grandmother         Non alcoholic    Asthma Neg Hx     Birth Defects Neg Hx     Cancer Neg Hx     Depression Neg Hx     High Blood Pressure Neg Hx     High Cholesterol Neg Hx     Learning Disabilities Neg Hx     Mental Illness Neg Hx     Mental PERRL. No sclera icterus. No conjunctival injection. ENT: No discharge. Pharynx clear. Mallampati class II/III. Neck: Trachea midline. No obvious mass. Neck circumference 15.75\"  Resp: No accessory muscle use. Nocrackles. No wheezes. No rhonchi. CV: Regular rate. Regular rhythm. No murmur or rub. No LE edema. GI: Non-tender. Non-distended. Skin: Warm and dry. No nodule on exposed extremities. Lymph: No cervical LAD. No supraclavicular LAD. M/S: No cyanosis. No obvious joint deformity. Neuro: Awake. Alert. Moves all four extremities. Psych: Oriented x 3. No anxiety. DATA:       Assessment:       · Snoring  · Suspected sleep apnea  · Fatigue  · Obesity  · HTN        Plan:      · HST to evaluate for sleep related breathing disorder. · Treatment options were discussed with patient if HST reveals VANESSA, including CPAP therapy, oral appliances and upper airway surgery. · Sleep hygiene  · Avoid sedatives, alcohol and caffeinated drinks at bed time. · No driving motorized vehicles or operating heavy machinery while fatigue, drowsy or sleepy. · Weight loss is also recommended as a long-term intervention. · Complications of VANESSA if not treated were discussed with patient patient to include systemic hypertension, pulmonary hypertension, cardiovascular morbidities, car accidents and all cause mortality. Discussed pathophysiology of VANESSA with patient today- video shown in office.   Patient education handout provided regarding sleep tips   Continue to follow with PCP and cardiology for elevated blood pressure

## 2019-05-22 NOTE — LETTER
[facility]  Phone: [phone]  Fax: [phone]    Tika Henning MD        May 22, 2019       Patient: Greg Henry   MR Number: [id number]   YOB: 1955   Date of Visit: 5/22/2019       Dear Dr. Adolfo Eric: Thank you for the request for consultation for Daniel Toure for the evaluation of sleep apnea. Below are the relevant portions of my assessment and plan of care. Assessment:       · Snoring  · Suspected sleep apnea  · Fatigue  · Obesity  · HTN        Plan:      · HST to evaluate for sleep related breathing disorder. · Treatment options were discussed with patient if HST reveals VANESSA, including CPAP therapy, oral appliances and upper airway surgery. · Sleep hygiene  · Avoid sedatives, alcohol and caffeinated drinks at bed time. · No driving motorized vehicles or operating heavy machinery while fatigue, drowsy or sleepy. · Weight loss is also recommended as a long-term intervention. · Complications of VANESSA if not treated were discussed with patient patient to include systemic hypertension, pulmonary hypertension, cardiovascular morbidities, car accidents and all cause mortality. Discussed pathophysiology of VANESSA with patient today- video shown in office. Patient education handout provided regarding sleep tips   Continue to follow with PCP and cardiology for elevated blood pressure    If you have questions, please do not hesitate to call me. I look forward to following Agustin Morris along with you.     Sincerely,        Naun Vieira, DEE - CNP    CC providers:  Michelle Diaz MD  [facility] [phone]  [facility]

## 2019-05-30 ENCOUNTER — HOSPITAL ENCOUNTER (OUTPATIENT)
Dept: NON INVASIVE DIAGNOSTICS | Age: 64
Discharge: HOME | End: 2019-05-30
Payer: COMMERCIAL

## 2019-05-30 DIAGNOSIS — R06.09 DOE (DYSPNEA ON EXERTION): ICD-10-CM

## 2019-05-30 LAB
LV EF: 60 %
LVEF MODALITY: NORMAL

## 2019-05-30 PROCEDURE — 93351 STRESS TTE COMPLETE: CPT

## 2019-05-30 ASSESSMENT — PAIN - FUNCTIONAL ASSESSMENT: PAIN_FUNCTIONAL_ASSESSMENT: 0-10

## 2019-05-31 RX ORDER — AMLODIPINE BESYLATE 5 MG/1
5 TABLET ORAL DAILY
Qty: 30 TABLET | Refills: 5 | Status: SHIPPED | OUTPATIENT
Start: 2019-05-31 | End: 2019-08-01 | Stop reason: ALTCHOICE

## 2019-05-31 NOTE — TELEPHONE ENCOUNTER
Spoke with the patient, she monitors her blood pressure at home and states that it is usually in the 150's/80's. Occasionally her BP was 200's/90. Please advise, thanks.

## 2019-06-02 ENCOUNTER — HOSPITAL ENCOUNTER (OUTPATIENT)
Dept: SLEEP CENTER | Age: 64
Discharge: HOME | End: 2019-06-04
Payer: COMMERCIAL

## 2019-06-02 DIAGNOSIS — E66.9 OBESITY (BMI 30-39.9): ICD-10-CM

## 2019-06-02 DIAGNOSIS — R06.83 SNORING: ICD-10-CM

## 2019-06-02 DIAGNOSIS — R53.83 OTHER FATIGUE: ICD-10-CM

## 2019-06-02 DIAGNOSIS — R29.818 SUSPECTED SLEEP APNEA: ICD-10-CM

## 2019-06-02 DIAGNOSIS — I10 ESSENTIAL HYPERTENSION: ICD-10-CM

## 2019-06-02 PROCEDURE — 95806 SLEEP STUDY UNATT&RESP EFFT: CPT

## 2019-06-04 ENCOUNTER — OFFICE VISIT (OUTPATIENT)
Dept: PULMONOLOGY | Age: 64
End: 2019-06-04
Payer: COMMERCIAL

## 2019-06-04 VITALS
HEIGHT: 67 IN | OXYGEN SATURATION: 98 % | DIASTOLIC BLOOD PRESSURE: 76 MMHG | HEART RATE: 56 BPM | SYSTOLIC BLOOD PRESSURE: 146 MMHG | WEIGHT: 245 LBS | TEMPERATURE: 98.3 F | BODY MASS INDEX: 38.45 KG/M2 | RESPIRATION RATE: 16 BRPM

## 2019-06-04 DIAGNOSIS — R29.818 SUSPECTED SLEEP APNEA: ICD-10-CM

## 2019-06-04 DIAGNOSIS — G47.34 NOCTURNAL HYPOXIA: ICD-10-CM

## 2019-06-04 DIAGNOSIS — E66.9 OBESITY (BMI 30-39.9): ICD-10-CM

## 2019-06-04 DIAGNOSIS — R53.83 OTHER FATIGUE: ICD-10-CM

## 2019-06-04 DIAGNOSIS — I10 ESSENTIAL HYPERTENSION: ICD-10-CM

## 2019-06-04 DIAGNOSIS — R06.83 SNORING: Primary | ICD-10-CM

## 2019-06-04 PROCEDURE — 99213 OFFICE O/P EST LOW 20 MIN: CPT | Performed by: NURSE PRACTITIONER

## 2019-06-04 ASSESSMENT — SLEEP AND FATIGUE QUESTIONNAIRES
HOW LIKELY ARE YOU TO NOD OFF OR FALL ASLEEP WHILE SITTING AND TALKING TO SOMEONE: 0
HOW LIKELY ARE YOU TO NOD OFF OR FALL ASLEEP WHILE SITTING AND READING: 1
ESS TOTAL SCORE: 4
HOW LIKELY ARE YOU TO NOD OFF OR FALL ASLEEP WHILE SITTING INACTIVE IN A PUBLIC PLACE: 0
HOW LIKELY ARE YOU TO NOD OFF OR FALL ASLEEP WHILE WATCHING TV: 0
HOW LIKELY ARE YOU TO NOD OFF OR FALL ASLEEP IN A CAR, WHILE STOPPED FOR A FEW MINUTES IN TRAFFIC: 0
HOW LIKELY ARE YOU TO NOD OFF OR FALL ASLEEP WHILE LYING DOWN TO REST IN THE AFTERNOON WHEN CIRCUMSTANCES PERMIT: 3
NECK CIRCUMFERENCE (INCHES): 15.75
HOW LIKELY ARE YOU TO NOD OFF OR FALL ASLEEP WHILE SITTING QUIETLY AFTER LUNCH WITHOUT ALCOHOL: 0
HOW LIKELY ARE YOU TO NOD OFF OR FALL ASLEEP WHEN YOU ARE A PASSENGER IN A CAR FOR AN HOUR WITHOUT A BREAK: 0

## 2019-06-04 NOTE — PATIENT INSTRUCTIONS
.Please keep all of your future appointments scheduled by [facility] office. Out of respect for other patients and providers, you may be asked to reschedule your appointment if you arrive later than your scheduled appointment time. Appointments cancelled less than 24hrs in advance will be considered a no show. Patients with three missed appointments within 1 year or four missed appointments within 2 years can be dismissed from the practice. Here are some tips to to getting better sleep  1- Avoid napping during the day: This will ensure you are tired at bedtime. If you have to take a nap, sleep less than one hour, before 3 pm.   2- Exercise regularly, but not right before bed: but the timing of the workout is important. Exercising in the morning or early afternoon will not interfere with sleep. Exercising within two hours before bedtime can decrease your ability to fall asleep. Regular exercise is recommended to help you deepen the sleep. 3- Avoid heavy, spicy, or sugary foods 4-6 hours before bedtime: These can affect your ability to stay asleep. 4- Have a light snack before bed: Having an empty stomach can interfere with your sleep. Dairy products and turkey contain tryptophan, which acts as a natural sleep inducer. 5- Stay away from caffeine, nicotine and alcohol at least 4-6 hours before bed: Caffeine and nicotine are stimulants that interfere with your ability to fall asleep. While alcohol has an immediate sleep-inducing effect, a few hours later, as alcohol levels in your blood start to fall, there is a stimulant effect and you will experience fragmented sleep. 6- Take a hot bath 90 minutes before bedtime:  A hot bath will raise your body temperature, but it is the drop in body temperature that may leave you feeling sleepy  7- Develop sleep rituals: it is important to give your body cues that it is time to slow down and sleep.  Listen to relaxing music, read

## 2019-06-04 NOTE — PROGRESS NOTES
Patient ID: Yaima Cullen is a 61 y.o. female who is being seen today for   Chief Complaint   Patient presents with    Follow-up     Discuss HST     Referring: Dr. Debbrah Boast    HPI:     Yaima Cullen is a 61 y.o. female in office for VANESSA follow up. HST was reviewed by me and noted below. Results were dicussed with patient and multiple good questions were answered. It showed no significant sleep related breathing disorder however low SPO2 83% in under 89% for 39.4 minutes     Bedtime is MN and rise time is 7 am Sleep onset is few minutes. Wakes up 0-3 times at night total. 0-3 nocturia. It takes few minutes to fall back a sleep. No naps during the day. Occasional headache in am. No weight gain. 2 caffienated beverages during the day. No alcohol. ESS is 4      Initial HPI 5/22/19  Yaima Cullen is a 61 y.o. female in office for sleep apnea evaluation. Patient reports snoring at night for the past 10 years. Worse in supine position. Occasionally wakes self snoring. Unsure if witnessed apnea, sleeps alone. Wakes up at night choking and gasping for air. No restorative sleep. No dry mouth upon awakening. Fatigue and tiredness during the day. No history of sleep apnea. Bedtime 10 pm and rise time is 7 am. It takes few minutes to fall asleep- plays on phone until gets sleepy then right to sleep. No TV in bedroom. 1 nocturia. Wakes up 1 times at night. It takes few minutes to fall back a sleep. Takes No nap during the day. Occasional headache in am. No car wrecks or near wrecks because of the sleepiness. No nodding off while driving. Gained 70 pounds in the past 9 years. No forgetfulness or decreased concentration. No nasal congestion at night. Drinks 2 caffinated beverages per day. No significant alcohol. No restless feelings in legs at night. No teeth grinding, maybe some clentching. No nightmares. No sleep walking. No night time panic attacks. No narcotics. No drug abuse. +history of depression.  +history of anxiety. No history of atrial fibrillation. No history of DM. +history of HTN. No history of ischemic heart disease. No history of stroke. ESS is 1. No smoking. No FH for RLS. +FH for VANESSA- dad. States son has narcolepsy. Blood pressure is elevated in office today. Denies CP, vision change or Headache. States she is working with PCP and cardiology    Sleep Medicine 6/4/2019 5/22/2019   Sitting and reading 1 0   Watching TV 0 0   Sitting, inactive in a public place (e.g. a theatre or a meeting) 0 0   As a passenger in a car for an hour without a break 0 0   Lying down to rest in the afternoon when circumstances permit 3 1   Sitting and talking to someone 0 0   Sitting quietly after a lunch without alcohol 0 0   In a car, while stopped for a few minutes in traffic 0 0   Total score 4 1   Neck circumference 15.75 15.75       Past Medical History:  Past Medical History:   Diagnosis Date    Acquired hypothyroidism 1/6/2016    Adjustment reaction with anxiety and depression 10/19/2015    Depression     Essential hypertension 10/19/2015    Gastroesophageal reflux disease without esophagitis 10/19/2015    Hiatal hernia     Hypertension     Obesity, Class II, BMI 35-39.9, with comorbidity 10/19/2015       Past Surgical History:        Procedure Laterality Date    COLONOSCOPY  2005    COLONOSCOPY      PARTIAL HYSTERECTOMY  2000    due to Fibroid       Allergies:  is allergic to sulfa antibiotics. Social History:    TOBACCO:   reports that she has never smoked. She has never used smokeless tobacco.  ETOH:   reports that she does not drink alcohol. Family History:       Problem Relation Age of Onset    Heart Disease Mother         A.  Fib    Alzheimer's Disease Mother     Arthritis Father     Hearing Loss Father     Heart Disease Father     Other Father         Alpha 1 antitrypson deficiency    Miscarriages / [de-identified] Sister     Other Sister         MS and Alpha 1 antitrypson deficiency    Kidney Disease Brother         Kidney stone    Stroke Maternal Grandmother     Heart Disease Maternal Grandmother     Diabetes Paternal Grandfather     Diabetes Paternal Aunt     Other Paternal Uncle         Alpha 1 antitrypson deficiency    Early Death Maternal Grandfather 47    Other Paternal Grandmother         Alpha 1 antitrypson deficiency    Cirrhosis Paternal Grandmother         Non alcoholic    Asthma Neg Hx     Birth Defects Neg Hx     Cancer Neg Hx     Depression Neg Hx     High Blood Pressure Neg Hx     High Cholesterol Neg Hx     Learning Disabilities Neg Hx     Mental Illness Neg Hx     Mental Retardation Neg Hx     Substance Abuse Neg Hx        Current Medications:    Current Outpatient Medications:     amLODIPine (NORVASC) 5 MG tablet, Take 1 tablet by mouth daily, Disp: 30 tablet, Rfl: 5    losartan-hydrochlorothiazide (HYZAAR) 100-25 MG per tablet, Take 1 tablet by mouth daily, Disp: 30 tablet, Rfl: 0    metoprolol succinate (TOPROL XL) 100 MG extended release tablet, Take 1 tablet by mouth daily, Disp: 90 tablet, Rfl: 1    levothyroxine (SYNTHROID) 50 MCG tablet, TAKE 1 TABLET BY MOUTH DAILY, Disp: 90 tablet, Rfl: 1    omeprazole (PRILOSEC OTC) 20 MG tablet, Take 1 tablet by mouth daily, Disp: 90 tablet, Rfl: 1    aspirin 81 MG EC tablet, Take 81 mg by mouth daily, Disp: , Rfl:     b complex vitamins capsule, Take 1 capsule by mouth daily, Disp: , Rfl:     Omega-3 Fatty Acids (FISH OIL) 1000 MG CAPS, Take 3,000 mg by mouth every other day, Disp: , Rfl:     Biotin 3 MG TABS, Take 3 mg by mouth daily, Disp: , Rfl:     Cholecalciferol (VITAMIN D3 PO), Take 500 mg by mouth daily, Disp: , Rfl:     Calcium Carbonate-Vitamin D (CALCIUM + D PO), Take by mouth daily, Disp: , Rfl:     Polyethylene Glycol 3350 (MIRALAX PO), Take by mouth as needed, Disp: , Rfl:         Review of Systems        Objective:   PHYSICAL EXAM:  BP (!) 146/76 (Site: Left Upper Arm, Position: Sitting)   Pulse 56   Temp 98.3 °F (36.8 °C) (Oral)   Resp 16   Ht 5' 7\" (1.702 m)   Wt 245 lb (111.1 kg)   SpO2 98%   BMI 38.37 kg/m²     Physical Exam  Gen: No acute distress. Obese. BMI of 38.37  Eyes: PERRL. No sclera icterus. No conjunctival injection. ENT: No discharge. Pharynx clear. Mallampati class II/III. Neck: Trachea midline. No obvious mass. Neck circumference 15.75\"  Resp: No accessory muscle use. No crackles. No wheezes. No rhonchi. CV: Regular rate. Regular rhythm. No murmur or rub. Skin: Warm and dry. M/S: No cyanosis. No obvious joint deformity. Neuro: Awake. Alert. Moves all four extremities. Psych: Oriented x 3. No anxiety. DATA:   6/3/19 HST No significant sleep related breathing disorder, low SPO2 83%, under 89% for 39.4 minutes. Recommendations in lab PSG as HST could be falsely negative given degree of hypoxia    Assessment:       · Snoring  · Suspected sleep apnea  · Fatigue  · Obesity  · HTN  · Nocturnal hypoxia        Plan:      · In lab PSG to evaluate for sleep related breathing disorder. · Treatment options were discussed with patient if PSG reveals VANESSA, including CPAP therapy, oral appliances and upper airway surgery. · Sleep hygiene  · Avoid sedatives, alcohol and caffeinated drinks at bed time. · No driving motorized vehicles or operating heavy machinery while fatigue, drowsy or sleepy. · Weight loss is also recommended as a long-term intervention. · Complications of VANESSA if not treated were discussed with patient patient to include systemic hypertension, pulmonary hypertension, cardiovascular morbidities, car accidents and all cause mortality.   Discussed pathophysiology of VANESSA with patient today  Patient education handout provided regarding sleep tips   Continue to follow with PCP and cardiology for elevated blood pressure

## 2019-06-07 ENCOUNTER — OFFICE VISIT (OUTPATIENT)
Dept: CARDIOLOGY CLINIC | Age: 64
End: 2019-06-07
Payer: COMMERCIAL

## 2019-06-07 ENCOUNTER — HOSPITAL ENCOUNTER (OUTPATIENT)
Age: 64
Discharge: HOME | End: 2019-06-07
Payer: COMMERCIAL

## 2019-06-07 VITALS
OXYGEN SATURATION: 98 % | BODY MASS INDEX: 38.58 KG/M2 | WEIGHT: 245.8 LBS | HEIGHT: 67 IN | DIASTOLIC BLOOD PRESSURE: 80 MMHG | HEART RATE: 63 BPM | SYSTOLIC BLOOD PRESSURE: 144 MMHG

## 2019-06-07 DIAGNOSIS — R06.09 DOE (DYSPNEA ON EXERTION): ICD-10-CM

## 2019-06-07 DIAGNOSIS — I10 ESSENTIAL HYPERTENSION: ICD-10-CM

## 2019-06-07 DIAGNOSIS — R94.31 ABNORMAL EKG: ICD-10-CM

## 2019-06-07 DIAGNOSIS — I10 ESSENTIAL HYPERTENSION: Primary | ICD-10-CM

## 2019-06-07 LAB
ANION GAP SERPL CALCULATED.3IONS-SCNC: 11 MMOL/L (ref 3–16)
BUN BLDV-MCNC: 25 MG/DL (ref 7–20)
CALCIUM SERPL-MCNC: 10.3 MG/DL (ref 8.3–10.6)
CHLORIDE BLD-SCNC: 98 MMOL/L (ref 99–110)
CO2: 27 MMOL/L (ref 21–32)
CREAT SERPL-MCNC: 1 MG/DL (ref 0.6–1.2)
GFR AFRICAN AMERICAN: >60
GFR NON-AFRICAN AMERICAN: 56
GLUCOSE BLD-MCNC: 133 MG/DL (ref 70–99)
POTASSIUM SERPL-SCNC: 4.2 MMOL/L (ref 3.5–5.1)
SODIUM BLD-SCNC: 136 MMOL/L (ref 136–145)

## 2019-06-07 PROCEDURE — 83735 ASSAY OF MAGNESIUM: CPT

## 2019-06-07 PROCEDURE — 99214 OFFICE O/P EST MOD 30 MIN: CPT | Performed by: NURSE PRACTITIONER

## 2019-06-07 PROCEDURE — 36415 COLL VENOUS BLD VENIPUNCTURE: CPT

## 2019-06-07 PROCEDURE — 80048 BASIC METABOLIC PNL TOTAL CA: CPT

## 2019-06-07 RX ORDER — METOPROLOL SUCCINATE 100 MG/1
50 TABLET, EXTENDED RELEASE ORAL DAILY
Qty: 45 TABLET | Refills: 0
Start: 2019-06-07 | End: 2019-10-01 | Stop reason: SDUPTHER

## 2019-06-07 RX ORDER — CHLORAL HYDRATE 500 MG
1000 CAPSULE ORAL DAILY
Qty: 30 CAPSULE | Refills: 0 | Status: SHIPPED | COMMUNITY
Start: 2019-06-07

## 2019-06-07 ASSESSMENT — ENCOUNTER SYMPTOMS: SHORTNESS OF BREATH: 1

## 2019-06-07 NOTE — PATIENT INSTRUCTIONS
1. Stop the aspirin  2. Need only 1000 mg of fish oil daily  3. No change in other BP medicines at this time  4. Have blood work to check electrolytes and kidney function  5. Will call you with results and any changes in medicines  6.  Follow up with Dr. Metz Files in August as scheduled

## 2019-06-07 NOTE — PROGRESS NOTES
[address]   Cardiac Evaluation    Primary Care Doctor:  Radha Duque MD    Chief Complaint   Patient presents with    Follow-up    Edema        History of Present Illness:   I had the pleasure of seeing Breanna Cope in follow up for HTN, TATUM and stress echo. The stress echo showed hypertensive response and LVH with near cavity obliteration. She was started on amlodipine 5 mg following test.  She has also been tested for sleep disorder, underwent home sleep test with notable low saturations, to have repeat sleep test in sleep lab. Her BP readings at home have dropped from 140-204/'s to 108/91 to 137/87. Her HR still runs in high 40's to 50's. She feels the HR is genetic as also occurred in family members. She is not very active due to dyspnea on exertion and hip pain. She notes increased swelling with the addition of amlodipine. She also reports dry mouth. Her breathing has improved, able to walk from 5/3rd to walker and back without dyspnea on exertion and able to talk and walk. Breanna Cope describes symptoms including dyspnea, fatigue, edema but denies chest pain, palpitations, orthopnea, PND, early saiety, syncope. NYHA:   II  ACC/ AHA Stage:    B    Past Medical History:   has a past medical history of Acquired hypothyroidism, Adjustment reaction with anxiety and depression, Depression, Essential hypertension, Gastroesophageal reflux disease without esophagitis, Hiatal hernia, Hypertension, and Obesity, Class II, BMI 35-39.9, with comorbidity. Surgical History:   has a past surgical history that includes partial hysterectomy (cervix not removed) (2000); Colonoscopy (2005); and Colonoscopy. Social History:   reports that she has never smoked. She has never used smokeless tobacco. She reports that she does not drink alcohol or use drugs. Family History:   Family History   Problem Relation Age of Onset    Heart Disease Mother         JAMES Davis    Alzheimer's Disease Mother     Arthritis Father     Hearing Loss Father     Heart Disease Father     Other Father         Alpha 1 antitrypson deficiency    Miscarriages / Branda [place] Sister     Other Sister         MS and Alpha 1 antitrypson deficiency    Kidney Disease Brother         Kidney stone    Stroke Maternal Grandmother     Heart Disease Maternal Grandmother     Diabetes Paternal Grandfather     Diabetes Paternal Aunt     Other Paternal Uncle         Alpha 1 antitrypson deficiency    Early Death Maternal Grandfather 47    Other Paternal Grandmother         Alpha 1 antitrypson deficiency    Cirrhosis Paternal Grandmother         Non alcoholic    Asthma Neg Hx     Birth Defects Neg Hx     Cancer Neg Hx     Depression Neg Hx     High Blood Pressure Neg Hx     High Cholesterol Neg Hx     Learning Disabilities Neg Hx     Mental Illness Neg Hx     Mental Retardation Neg Hx     Substance Abuse Neg Hx        Home Medications:  Prior to Admission medications    Medication Sig Start Date End Date Taking?  Authorizing Provider   amLODIPine (NORVASC) 5 MG tablet Take 1 tablet by mouth daily 5/31/19  Yes Michelle Reynoso MD   losartan-hydrochlorothiazide [facility]) 100-25 MG per tablet Take 1 tablet by mouth daily 5/16/19  Yes Lincoln Louise MD   metoprolol succinate (TOPROL XL) 100 MG extended release tablet Take 1 tablet by mouth daily 4/18/19  Yes Lincoln Louise MD   levothyroxine (SYNTHROID) 50 MCG tablet TAKE 1 TABLET BY MOUTH DAILY 4/18/19  Yes Lincoln Louise MD   omeprazole (PRILOSEC OTC) 20 MG tablet Take 1 tablet by mouth daily 4/18/19  Yes Lincoln Louise MD   aspirin 81 MG EC tablet Take 81 mg by mouth daily   Yes Historical Provider, MD   b complex vitamins capsule Take 1 capsule by mouth daily   Yes Historical Provider, MD   Omega-3 Fatty Acids (FISH OIL) 1000 MG CAPS Take 3,000 mg by mouth every other day   Yes Historical Provider, MD   Biotin 3 MG TABS Take 3 mg by mouth daily   Yes Historical Provider, MD Cholecalciferol (VITAMIN D3 PO) Take 500 mg by mouth daily   Yes Historical Provider, MD   Calcium Carbonate-Vitamin D (CALCIUM + D PO) Take by mouth daily   Yes Historical Provider, MD   Polyethylene Glycol 3350 (MIRALAX PO) Take by mouth as needed    Historical Provider, MD        Allergies:  Sulfa antibiotics     Review of Systems:   Review of Systems   Constitutional: Positive for fatigue. Eyes: Positive for visual disturbance. Respiratory: Positive for shortness of breath. Cardiovascular: Positive for leg swelling. Negative for chest pain and palpitations. Musculoskeletal: Positive for arthralgias. Psychiatric/Behavioral: Positive for sleep disturbance. Physical Examination:    Vitals:    06/07/19 0946 06/07/19 0951   BP: (!) 144/80 (!) 144/80   Pulse: 63    SpO2: 98%    Weight: 245 lb 12.8 oz (111.5 kg)    Height: 5' 7\" (1.702 m)         Constitutional and General Appearance: Warm and dry, no apparent distress, normal coloration  HEENT:  Normocephalic, atraumatic  Respiratory:  · Normal excursion and expansion without use of accessory muscles  · Resp Auscultation: Clear to auscultation   Cardiovascular:  · The apical impulses not displaced  · Heart tones are crisp and normal  · JVP 8 cm H2O  · Regular rate and rhythm, normal S1S2, no m/g/r  · Peripheral pulses are symmetrical and full  · There is no clubbing, cyanosis of the extremities.   · 1+ BLE edema to mid tibia  · Pedal Pulses: 2+ and equal   Abdomen:  · No masses or tenderness  · Liver/Spleen: No Abnormalities Noted  Neurological/Psychiatric:  · Alert and oriented in all spheres  · Moves all extremities well  · Exhibits normal gait balance and coordination  · No abnormalities of mood, affect, memory, mentation, or behavior are noted    Lab Data:  Most recent lab results below reviewed in office    CBC:   Lab Results   Component Value Date    WBC 5.1 10/18/2018    WBC 4.9 10/12/2017    WBC 5.1 10/05/2016    RBC 5.26 10/18/2018    RBC to check electrolytes and kidney function  5. Will call you with results and any changes in medicines  6. Follow up with Dr. Miller Hamlin in August as scheduled (was her husbands cardiologist, wishes to follow with him due to history)      I appreciate the opportunity of cooperating in the care of this individual.    Wellington Wren, APRN - CNP, 6/7/2019, 9:59 AM       QUALITY MEASURES  1. Tobacco Cessation Counseling: NA  2. Retake of BP if >140/90:   Yes  3. Documentation to PCP/referring for new patient:  Sent to PCP at close of office visit  4. CAD patient on anti-platelet: NA  5. CAD patient on STATIN therapy:  NA  6.  Patient with CHF and aFib on anticoagulation:  NA

## 2019-06-08 LAB — MAGNESIUM: 2.2 MG/DL (ref 1.8–2.4)

## 2019-06-10 RX ORDER — LOSARTAN POTASSIUM AND HYDROCHLOROTHIAZIDE 12.5; 1 MG/1; MG/1
1 TABLET ORAL DAILY
Qty: 90 TABLET | Refills: 1 | Status: SHIPPED | OUTPATIENT
Start: 2019-06-10 | End: 2019-10-01 | Stop reason: SDUPTHER

## 2019-06-10 RX ORDER — DOXAZOSIN MESYLATE 4 MG/1
4 TABLET ORAL NIGHTLY
Qty: 90 TABLET | Refills: 1 | Status: SHIPPED | OUTPATIENT
Start: 2019-06-10 | End: 2019-10-01 | Stop reason: SDUPTHER

## 2019-06-10 NOTE — TELEPHONE ENCOUNTER
----- Message from DEE Rodriguez CNP sent at 6/10/2019  8:17 AM EDT -----  Electrolytes are okay. She looks a bit \"dry\". Let's change the losartan hct from 100-25 to 100-12.5 mg daily, keep the metoprolol the same. Decrease the amlodipine from 5 mg to 2.5 mg (edema) and ADD doxazosin (Cardura) 4 mg nightly.      Thanks, [employer]

## 2019-06-21 ENCOUNTER — HOSPITAL ENCOUNTER (OUTPATIENT)
Dept: SLEEP CENTER | Age: 64
Discharge: HOME | End: 2019-06-23
Payer: COMMERCIAL

## 2019-06-21 DIAGNOSIS — R53.83 OTHER FATIGUE: ICD-10-CM

## 2019-06-21 DIAGNOSIS — G47.34 NOCTURNAL HYPOXIA: ICD-10-CM

## 2019-06-21 DIAGNOSIS — R29.818 SUSPECTED SLEEP APNEA: ICD-10-CM

## 2019-06-21 DIAGNOSIS — R06.83 SNORING: ICD-10-CM

## 2019-06-21 DIAGNOSIS — I10 ESSENTIAL HYPERTENSION: ICD-10-CM

## 2019-06-21 DIAGNOSIS — E66.9 OBESITY (BMI 30-39.9): ICD-10-CM

## 2019-06-21 PROCEDURE — 95810 POLYSOM 6/> YRS 4/> PARAM: CPT

## 2019-06-27 ENCOUNTER — TELEPHONE (OUTPATIENT)
Dept: PULMONOLOGY | Age: 64
End: 2019-06-27

## 2019-06-27 DIAGNOSIS — G47.33 OSA (OBSTRUCTIVE SLEEP APNEA): Primary | ICD-10-CM

## 2019-07-12 ENCOUNTER — OFFICE VISIT (OUTPATIENT)
Dept: FAMILY MEDICINE CLINIC | Age: 64
End: 2019-07-12
Payer: COMMERCIAL

## 2019-07-12 VITALS
WEIGHT: 247.2 LBS | OXYGEN SATURATION: 97 % | HEART RATE: 49 BPM | BODY MASS INDEX: 38.8 KG/M2 | HEIGHT: 67 IN | DIASTOLIC BLOOD PRESSURE: 70 MMHG | SYSTOLIC BLOOD PRESSURE: 132 MMHG

## 2019-07-12 DIAGNOSIS — N30.01 ACUTE CYSTITIS WITH HEMATURIA: Primary | ICD-10-CM

## 2019-07-12 DIAGNOSIS — R30.0 DYSURIA: ICD-10-CM

## 2019-07-12 LAB
BILIRUBIN, POC: NEGATIVE
BLOOD URINE, POC: NORMAL
CLARITY, POC: NORMAL
COLOR, POC: NORMAL
GLUCOSE URINE, POC: NEGATIVE
KETONES, POC: NEGATIVE
LEUKOCYTE EST, POC: NORMAL
NITRITE, POC: NORMAL
PH, POC: 7
PROTEIN, POC: NEGATIVE
SPECIFIC GRAVITY, POC: 1.01
UROBILINOGEN, POC: NORMAL

## 2019-07-12 PROCEDURE — 99213 OFFICE O/P EST LOW 20 MIN: CPT | Performed by: PHYSICIAN ASSISTANT

## 2019-07-12 PROCEDURE — 81002 URINALYSIS NONAUTO W/O SCOPE: CPT | Performed by: PHYSICIAN ASSISTANT

## 2019-07-12 RX ORDER — NITROFURANTOIN 25; 75 MG/1; MG/1
100 CAPSULE ORAL 2 TIMES DAILY
Qty: 20 CAPSULE | Refills: 0 | Status: SHIPPED | OUTPATIENT
Start: 2019-07-12 | End: 2019-07-22

## 2019-07-30 NOTE — PROGRESS NOTES
Polyethylene Glycol 3350 (MIRALAX PO) Take by mouth as needed   Yes Historical Provider, MD        Allergies:  Sulfa antibiotics     Review of Systems:   · Constitutional: there has been no unanticipated weight loss. There's been no change in energy level, sleep pattern, or activity level. · Eyes: No visual changes or diplopia. No scleral icterus. · ENT: No Headaches, hearing loss or vertigo. No mouth sores or sore throat. · Cardiovascular: Reviewed in HPI  · Respiratory: No cough or wheezing, no sputum production. No hematemesis. · Gastrointestinal: No abdominal pain, appetite loss, blood in stools. No change in bowel or bladder habits. · Genitourinary: No dysuria, trouble voiding, or hematuria. · Musculoskeletal:  No gait disturbance, weakness or joint complaints. · Integumentary: No rash or pruritis. · Neurological: No headache, diplopia, change in muscle strength, numbness or tingling. No change in gait, balance, coordination, mood, affect, memory, mentation, behavior. · Psychiatric: No anxiety, no depression. · Endocrine: No malaise, fatigue or temperature intolerance. No excessive thirst, fluid intake, or urination. No tremor. · Hematologic/Lymphatic: No abnormal bruising or bleeding, blood clots or swollen lymph nodes. · Allergic/Immunologic: No nasal congestion or hives. Physical Examination:    Vitals:    08/01/19 1239   BP: 124/78   Pulse: 56   SpO2: 99%        Constitutional and General Appearance: NAD   Respiratory:  · Normal excursion and expansion without use of accessory muscles  · Resp Auscultation: Normal breath sounds without dullness  Cardiovascular:  · The apical impulses not displaced  · Heart tones are crisp and normal  · Cervical veins are not engorged  · The carotid upstroke is normal in amplitude and contour without delay or bruit  · Normal S1S2, No S3, No Murmur  · Peripheral pulses are symmetrical and full  · There is no clubbing, cyanosis of the extremities.   · 2 + BLE edema  · Femoral Arteries: 2+ and equal  · Pedal Pulses: 2+ and equal   Abdomen:  · No masses or tenderness  · Liver/Spleen: No Abnormalities Noted  Neurological/Psychiatric:  · Alert and oriented in all spheres  · Moves all extremities well  · Exhibits normal gait balance and coordination  · No abnormalities of mood, affect, memory, mentation, or behavior are noted        Skin: warm and dry      Chemistry        Component Value Date/Time     06/07/2019 1051    K 4.2 06/07/2019 1051    CL 98 (L) 06/07/2019 1051    CO2 27 06/07/2019 1051    BUN 25 (H) 06/07/2019 1051    CREATININE 1.0 06/07/2019 1051        Component Value Date/Time    CALCIUM 10.3 06/07/2019 1051    ALKPHOS 98 10/18/2018 0752    AST 19 10/18/2018 0752    ALT 20 10/18/2018 0752    BILITOT 0.9 10/18/2018 0752          Labs 10/18/18 TSH 2.59    Assessment:     1. Essential hypertension: Well controlled today but will adjust medication given her LE swelling and noting this has been present since starting norvasc in May this year. 2.      Diastolic CHF: Likely hypertensive heart disease and diastolic CHF. She will need good BP control and diuretics for LE edema and SOB. Likely fluid overloaded now. Clinically mildly decompensated NYHA Class II and will adjust CHF medical regimen. 3.       LE edema:  Likely due to diastolic CHF, VANESSA, and possibly norvasc as well. Will start diuretic. Plan:  1. Stop amlodipine (Norvasc) in view of BLE edema   2. Start lasix 40mg daily   3. Limit fluid intake to 64 oz daily   4. Follow Low sodium diet. I had long d/w her about salt, fluids, and elevating legs when off them. 5. Check BMP in 1 week  6. Follow up in 2 months   7. Goal BP < 140/90    This note was scribed in the presence of Adonay Bruno MD by Sunita Mcfadden RN    I, Dr. Katie Roldan, personally performed the services described in this documentation, as scribed by the above signed scribe in my presence.  It is both accurate and complete to my knowledge. I agree with the details independently gathered by the clinical support staff, while the remaining scribed note accurately describes my personal service to the patient. Cost of prescription medications and patient compliance have been reviewed with patient. All questions answered. Thank you for allowing me to participate in the care of this individual.    Ama Parada.  Navneet Chua M.D., [facility]

## 2019-08-01 ENCOUNTER — OFFICE VISIT (OUTPATIENT)
Dept: CARDIOLOGY CLINIC | Age: 64
End: 2019-08-01
Payer: COMMERCIAL

## 2019-08-01 VITALS
DIASTOLIC BLOOD PRESSURE: 78 MMHG | HEART RATE: 56 BPM | HEIGHT: 67 IN | OXYGEN SATURATION: 99 % | WEIGHT: 244 LBS | SYSTOLIC BLOOD PRESSURE: 124 MMHG | BODY MASS INDEX: 38.3 KG/M2

## 2019-08-01 DIAGNOSIS — I10 ESSENTIAL HYPERTENSION: Primary | ICD-10-CM

## 2019-08-01 PROCEDURE — 99214 OFFICE O/P EST MOD 30 MIN: CPT | Performed by: INTERNAL MEDICINE

## 2019-08-01 RX ORDER — FUROSEMIDE 40 MG/1
40 TABLET ORAL DAILY
Qty: 30 TABLET | Refills: 11 | Status: SHIPPED | OUTPATIENT
Start: 2019-08-01 | End: 2020-06-12

## 2019-08-01 NOTE — PATIENT INSTRUCTIONS
[address]   Cardiac Followup    Referring Provider:  Juan Antonio Maier MD     Chief Complaint   Patient presents with    3 Month Follow-Up    Hypertension    Results     ekg 05/20/2019, pascale & echo 05/30/2019    Discuss Labs     in chart    Shortness of Breath    Dizziness     little is normal for her    Edema     feet    Fatigue     energy not great      Subjective: Ms Refuigo Narvaez is here for cardiology follow up HTN; c/o TATUM longer walking/stairs and swelling    History of Present Illness:   Ms. Refugio Narvaez is a 59 y.o. female here for routine cardiac f/u. I used to see her  who passed away in 2016. She has PMH HTN,, Hypothyroidism, GERD, mild VANESSA. She seen my partner Dr. Jocelyn Newberry as new pt 5/20/19 for HTN, TATUM, and abnormal EKG. EKG 5/20/19   Marked sinus  Bradycardia 47 bpm; LAE; Nonspecific ST & T wave abnormality. Stress ECHO 6/4/19 Significant hypertensive response to exercise. Significant LVH on baseline echo. Near cavity obliteration at peak stress. otherwise normal stress ECHO without ischemia. EF 60%. Sleep study 6/27/19 revealed Mild VANESSA. Today she reports swelling BLE. She reports symptoms started when she began taking norvasc in May 2019. She reports SOB with  exertion. She reports she can climb 2 flights of stairs before becoming SOB. She recently walked one mile with family. She has been off Hyzaar for one month just resumed today. She was not taking  due to unknown prescription issue which is now resolved. She reports having CPAP fitting scheduled On 8/5/19. She has been off Hyzaar for one month and just resumed today. Past Medical History:   has a past medical history of Acquired hypothyroidism, Adjustment reaction with anxiety and depression, Depression, Essential hypertension, Gastroesophageal reflux disease without esophagitis, Hiatal hernia, Hypertension, and Obesity, Class II, BMI 35-39.9, with comorbidity.     Surgical History:   has a past surgical history that

## 2019-08-05 ENCOUNTER — HOSPITAL ENCOUNTER (OUTPATIENT)
Dept: SLEEP CENTER | Age: 64
Discharge: HOME | End: 2019-08-07
Payer: COMMERCIAL

## 2019-08-05 DIAGNOSIS — G47.33 OSA (OBSTRUCTIVE SLEEP APNEA): ICD-10-CM

## 2019-08-05 PROCEDURE — 95811 POLYSOM 6/>YRS CPAP 4/> PARM: CPT

## 2019-08-09 ENCOUNTER — TELEPHONE (OUTPATIENT)
Dept: PULMONOLOGY | Age: 64
End: 2019-08-09

## 2019-08-09 DIAGNOSIS — G47.33 OSA (OBSTRUCTIVE SLEEP APNEA): Primary | ICD-10-CM

## 2019-08-09 NOTE — TELEPHONE ENCOUNTER
[facility] called stating that patient's insurance is not in network. Spoke with Patient per patient request orders faxed to [facility].

## 2019-08-10 ENCOUNTER — HOSPITAL ENCOUNTER (OUTPATIENT)
Age: 64
Discharge: HOME | End: 2019-08-10
Payer: COMMERCIAL

## 2019-08-10 DIAGNOSIS — I10 ESSENTIAL HYPERTENSION: ICD-10-CM

## 2019-08-10 PROCEDURE — 80048 BASIC METABOLIC PNL TOTAL CA: CPT

## 2019-08-10 PROCEDURE — 36415 COLL VENOUS BLD VENIPUNCTURE: CPT

## 2019-08-11 LAB
ANION GAP SERPL CALCULATED.3IONS-SCNC: 18 MMOL/L (ref 3–16)
BUN BLDV-MCNC: 26 MG/DL (ref 7–20)
CALCIUM SERPL-MCNC: 9.8 MG/DL (ref 8.3–10.6)
CHLORIDE BLD-SCNC: 102 MMOL/L (ref 99–110)
CO2: 24 MMOL/L (ref 21–32)
CREAT SERPL-MCNC: 1 MG/DL (ref 0.6–1.2)
GFR AFRICAN AMERICAN: >60
GFR NON-AFRICAN AMERICAN: 56
GLUCOSE BLD-MCNC: 231 MG/DL (ref 70–99)
POTASSIUM SERPL-SCNC: 3.9 MMOL/L (ref 3.5–5.1)
SODIUM BLD-SCNC: 144 MMOL/L (ref 136–145)

## 2019-08-12 ENCOUNTER — TELEPHONE (OUTPATIENT)
Dept: CARDIOLOGY CLINIC | Age: 64
End: 2019-08-12

## 2019-08-12 NOTE — TELEPHONE ENCOUNTER
----- Message from Yajaira Mcclendon MD sent at 8/12/2019 11:33 AM EDT -----  Blood test is ok except blood sugar is high

## 2019-09-30 PROBLEM — I50.32 CHRONIC DIASTOLIC CONGESTIVE HEART FAILURE (HCC): Status: ACTIVE | Noted: 2019-09-30

## 2019-10-01 ENCOUNTER — OFFICE VISIT (OUTPATIENT)
Dept: CARDIOLOGY CLINIC | Age: 64
End: 2019-10-01
Payer: COMMERCIAL

## 2019-10-01 VITALS
OXYGEN SATURATION: 96 % | HEART RATE: 50 BPM | BODY MASS INDEX: 37.67 KG/M2 | WEIGHT: 240 LBS | SYSTOLIC BLOOD PRESSURE: 110 MMHG | HEIGHT: 67 IN | DIASTOLIC BLOOD PRESSURE: 80 MMHG

## 2019-10-01 DIAGNOSIS — I50.32 CHRONIC DIASTOLIC CONGESTIVE HEART FAILURE (HCC): ICD-10-CM

## 2019-10-01 DIAGNOSIS — R06.09 DOE (DYSPNEA ON EXERTION): ICD-10-CM

## 2019-10-01 DIAGNOSIS — I10 ESSENTIAL HYPERTENSION: Primary | ICD-10-CM

## 2019-10-01 PROCEDURE — 99214 OFFICE O/P EST MOD 30 MIN: CPT | Performed by: INTERNAL MEDICINE

## 2019-10-01 RX ORDER — LOSARTAN POTASSIUM AND HYDROCHLOROTHIAZIDE 12.5; 1 MG/1; MG/1
1 TABLET ORAL DAILY
Qty: 90 TABLET | Refills: 3 | Status: SHIPPED | OUTPATIENT
Start: 2019-10-01 | End: 2019-12-13

## 2019-10-01 RX ORDER — METOPROLOL SUCCINATE 25 MG/1
25 TABLET, EXTENDED RELEASE ORAL DAILY
Qty: 90 TABLET | Refills: 3 | Status: SHIPPED | OUTPATIENT
Start: 2019-10-01 | End: 2020-09-10

## 2019-10-01 RX ORDER — DOXAZOSIN MESYLATE 4 MG/1
4 TABLET ORAL NIGHTLY
Qty: 90 TABLET | Refills: 3 | Status: SHIPPED | OUTPATIENT
Start: 2019-10-01 | End: 2020-11-16 | Stop reason: SDUPTHER

## 2019-10-14 ENCOUNTER — OFFICE VISIT (OUTPATIENT)
Dept: DERMATOLOGY | Age: 64
End: 2019-10-14
Payer: COMMERCIAL

## 2019-10-14 DIAGNOSIS — L60.9 NAIL DISORDER: ICD-10-CM

## 2019-10-14 DIAGNOSIS — L30.9 DERMATITIS: Primary | ICD-10-CM

## 2019-10-14 PROCEDURE — 99213 OFFICE O/P EST LOW 20 MIN: CPT | Performed by: DERMATOLOGY

## 2019-10-14 RX ORDER — CLOBETASOL PROPIONATE 0.5 MG/G
CREAM TOPICAL
Qty: 60 G | Refills: 2 | Status: SHIPPED | OUTPATIENT
Start: 2019-10-14

## 2019-10-15 ENCOUNTER — TELEPHONE (OUTPATIENT)
Dept: PULMONOLOGY | Age: 64
End: 2019-10-15

## 2019-10-15 ENCOUNTER — OFFICE VISIT (OUTPATIENT)
Dept: PULMONOLOGY | Age: 64
End: 2019-10-15
Payer: COMMERCIAL

## 2019-10-15 VITALS
WEIGHT: 240 LBS | RESPIRATION RATE: 16 BRPM | OXYGEN SATURATION: 97 % | SYSTOLIC BLOOD PRESSURE: 125 MMHG | TEMPERATURE: 97.9 F | HEART RATE: 48 BPM | HEIGHT: 67 IN | BODY MASS INDEX: 37.67 KG/M2 | DIASTOLIC BLOOD PRESSURE: 69 MMHG

## 2019-10-15 DIAGNOSIS — Z71.89 CPAP USE COUNSELING: ICD-10-CM

## 2019-10-15 DIAGNOSIS — E66.9 OBESITY (BMI 30-39.9): ICD-10-CM

## 2019-10-15 DIAGNOSIS — G47.33 MILD OBSTRUCTIVE SLEEP APNEA: Primary | ICD-10-CM

## 2019-10-15 PROCEDURE — 99214 OFFICE O/P EST MOD 30 MIN: CPT | Performed by: NURSE PRACTITIONER

## 2019-10-15 ASSESSMENT — SLEEP AND FATIGUE QUESTIONNAIRES
HOW LIKELY ARE YOU TO NOD OFF OR FALL ASLEEP WHILE LYING DOWN TO REST IN THE AFTERNOON WHEN CIRCUMSTANCES PERMIT: 3
HOW LIKELY ARE YOU TO NOD OFF OR FALL ASLEEP WHILE SITTING INACTIVE IN A PUBLIC PLACE: 0
HOW LIKELY ARE YOU TO NOD OFF OR FALL ASLEEP WHEN YOU ARE A PASSENGER IN A CAR FOR AN HOUR WITHOUT A BREAK: 0
HOW LIKELY ARE YOU TO NOD OFF OR FALL ASLEEP WHILE SITTING AND READING: 0
HOW LIKELY ARE YOU TO NOD OFF OR FALL ASLEEP WHILE SITTING AND TALKING TO SOMEONE: 0
HOW LIKELY ARE YOU TO NOD OFF OR FALL ASLEEP WHILE SITTING QUIETLY AFTER LUNCH WITHOUT ALCOHOL: 0
ESS TOTAL SCORE: 3
NECK CIRCUMFERENCE (INCHES): 15.75
HOW LIKELY ARE YOU TO NOD OFF OR FALL ASLEEP IN A CAR, WHILE STOPPED FOR A FEW MINUTES IN TRAFFIC: 0
HOW LIKELY ARE YOU TO NOD OFF OR FALL ASLEEP WHILE WATCHING TV: 0

## 2019-10-21 LAB — DERMATOLOGY PATHOLOGY REPORT: NORMAL

## 2019-10-23 DIAGNOSIS — L60.9 NAIL DISORDER: Primary | ICD-10-CM

## 2019-10-29 ENCOUNTER — TELEPHONE (OUTPATIENT)
Dept: DERMATOLOGY | Age: 64
End: 2019-10-29

## 2019-10-30 ENCOUNTER — TELEPHONE (OUTPATIENT)
Dept: CARDIOLOGY CLINIC | Age: 64
End: 2019-10-30

## 2019-10-30 RX ORDER — HYDROCHLOROTHIAZIDE 12.5 MG/1
12.5 TABLET ORAL DAILY
COMMUNITY
End: 2019-10-30 | Stop reason: SDUPTHER

## 2019-10-30 RX ORDER — LOSARTAN POTASSIUM 100 MG/1
100 TABLET ORAL DAILY
COMMUNITY
End: 2019-10-30 | Stop reason: SDUPTHER

## 2019-10-31 RX ORDER — LOSARTAN POTASSIUM 100 MG/1
100 TABLET ORAL DAILY
Qty: 90 TABLET | Refills: 3 | Status: SHIPPED | OUTPATIENT
Start: 2019-10-31 | End: 2020-09-15 | Stop reason: SDUPTHER

## 2019-10-31 RX ORDER — HYDROCHLOROTHIAZIDE 12.5 MG/1
12.5 TABLET ORAL DAILY
Qty: 90 TABLET | Refills: 3 | Status: SHIPPED | OUTPATIENT
Start: 2019-10-31 | End: 2020-07-27 | Stop reason: ALTCHOICE

## 2019-11-27 ENCOUNTER — TELEPHONE (OUTPATIENT)
Dept: DERMATOLOGY | Age: 64
End: 2019-11-27

## 2019-12-13 ENCOUNTER — OFFICE VISIT (OUTPATIENT)
Dept: FAMILY MEDICINE CLINIC | Age: 64
End: 2019-12-13
Payer: COMMERCIAL

## 2019-12-13 VITALS
HEIGHT: 67 IN | DIASTOLIC BLOOD PRESSURE: 88 MMHG | BODY MASS INDEX: 37.83 KG/M2 | HEART RATE: 62 BPM | TEMPERATURE: 98 F | OXYGEN SATURATION: 96 % | SYSTOLIC BLOOD PRESSURE: 136 MMHG | WEIGHT: 241 LBS

## 2019-12-13 DIAGNOSIS — G89.29 CHRONIC LOW BACK PAIN WITHOUT SCIATICA, UNSPECIFIED BACK PAIN LATERALITY: ICD-10-CM

## 2019-12-13 DIAGNOSIS — Z12.39 BREAST CANCER SCREENING: ICD-10-CM

## 2019-12-13 DIAGNOSIS — Z13.220 LIPID SCREENING: ICD-10-CM

## 2019-12-13 DIAGNOSIS — E03.9 ACQUIRED HYPOTHYROIDISM: ICD-10-CM

## 2019-12-13 DIAGNOSIS — E11.9 DIET-CONTROLLED TYPE 2 DIABETES MELLITUS (HCC): ICD-10-CM

## 2019-12-13 DIAGNOSIS — N39.0 RECURRENT URINARY TRACT INFECTION: Primary | ICD-10-CM

## 2019-12-13 DIAGNOSIS — M54.50 CHRONIC LOW BACK PAIN WITHOUT SCIATICA, UNSPECIFIED BACK PAIN LATERALITY: ICD-10-CM

## 2019-12-13 DIAGNOSIS — K21.9 GASTROESOPHAGEAL REFLUX DISEASE WITHOUT ESOPHAGITIS: ICD-10-CM

## 2019-12-13 DIAGNOSIS — I10 ESSENTIAL HYPERTENSION: ICD-10-CM

## 2019-12-13 LAB
CHOLESTEROL, TOTAL: 160 MG/DL (ref 0–199)
CREATININE URINE: 23.5 MG/DL (ref 28–259)
HDLC SERPL-MCNC: 46 MG/DL (ref 40–60)
LDL CHOLESTEROL CALCULATED: 95 MG/DL
MICROALBUMIN UR-MCNC: <1.2 MG/DL
MICROALBUMIN/CREAT UR-RTO: ABNORMAL MG/G (ref 0–30)
TRIGL SERPL-MCNC: 95 MG/DL (ref 0–150)
TSH REFLEX FT4: 2.8 UIU/ML (ref 0.27–4.2)
VLDLC SERPL CALC-MCNC: 19 MG/DL

## 2019-12-13 PROCEDURE — 99203 OFFICE O/P NEW LOW 30 MIN: CPT | Performed by: FAMILY MEDICINE

## 2019-12-13 PROCEDURE — 36415 COLL VENOUS BLD VENIPUNCTURE: CPT | Performed by: FAMILY MEDICINE

## 2019-12-13 SDOH — HEALTH STABILITY: MENTAL HEALTH: HOW OFTEN DO YOU HAVE A DRINK CONTAINING ALCOHOL?: MONTHLY OR LESS

## 2019-12-13 ASSESSMENT — ENCOUNTER SYMPTOMS
BACK PAIN: 1
SHORTNESS OF BREATH: 0

## 2019-12-14 LAB
ESTIMATED AVERAGE GLUCOSE: 139.9 MG/DL
HBA1C MFR BLD: 6.5 %

## 2019-12-16 ENCOUNTER — HOSPITAL ENCOUNTER (OUTPATIENT)
Dept: GENERAL RADIOLOGY | Age: 64
Discharge: HOME | End: 2019-12-16
Payer: COMMERCIAL

## 2019-12-16 ENCOUNTER — HOSPITAL ENCOUNTER (OUTPATIENT)
Age: 64
Discharge: HOME | End: 2019-12-16
Payer: COMMERCIAL

## 2019-12-16 DIAGNOSIS — G89.29 CHRONIC LOW BACK PAIN WITHOUT SCIATICA, UNSPECIFIED BACK PAIN LATERALITY: ICD-10-CM

## 2019-12-16 DIAGNOSIS — M54.50 CHRONIC LOW BACK PAIN WITHOUT SCIATICA, UNSPECIFIED BACK PAIN LATERALITY: ICD-10-CM

## 2019-12-16 PROCEDURE — 72100 X-RAY EXAM L-S SPINE 2/3 VWS: CPT

## 2019-12-17 DIAGNOSIS — G89.29 CHRONIC LOW BACK PAIN WITHOUT SCIATICA, UNSPECIFIED BACK PAIN LATERALITY: Primary | ICD-10-CM

## 2019-12-17 DIAGNOSIS — M54.50 CHRONIC LOW BACK PAIN WITHOUT SCIATICA, UNSPECIFIED BACK PAIN LATERALITY: Primary | ICD-10-CM

## 2019-12-18 ENCOUNTER — INITIAL CONSULT (OUTPATIENT)
Dept: GASTROENTEROLOGY | Age: 64
End: 2019-12-18
Payer: COMMERCIAL

## 2019-12-18 VITALS
HEIGHT: 67 IN | SYSTOLIC BLOOD PRESSURE: 128 MMHG | HEART RATE: 52 BPM | BODY MASS INDEX: 37.67 KG/M2 | OXYGEN SATURATION: 98 % | DIASTOLIC BLOOD PRESSURE: 84 MMHG | WEIGHT: 240 LBS

## 2019-12-18 DIAGNOSIS — R13.19 ESOPHAGEAL DYSPHAGIA: Primary | ICD-10-CM

## 2019-12-18 DIAGNOSIS — R12 HEARTBURN: ICD-10-CM

## 2019-12-18 PROCEDURE — 99204 OFFICE O/P NEW MOD 45 MIN: CPT | Performed by: INTERNAL MEDICINE

## 2019-12-18 RX ORDER — OMEPRAZOLE 20 MG/1
20 CAPSULE, DELAYED RELEASE ORAL DAILY
Qty: 90 CAPSULE | Refills: 3 | Status: ON HOLD | OUTPATIENT
Start: 2019-12-18 | End: 2020-01-09 | Stop reason: ALTCHOICE

## 2019-12-20 ENCOUNTER — HOSPITAL ENCOUNTER (OUTPATIENT)
Dept: MAMMOGRAPHY | Age: 64
Discharge: HOME | End: 2019-12-20
Payer: COMMERCIAL

## 2019-12-20 DIAGNOSIS — Z12.39 BREAST CANCER SCREENING: ICD-10-CM

## 2019-12-20 PROCEDURE — 77063 BREAST TOMOSYNTHESIS BI: CPT

## 2019-12-26 ENCOUNTER — HOSPITAL ENCOUNTER (OUTPATIENT)
Dept: PHYSICAL THERAPY | Age: 64
Setting detail: THERAPIES SERIES
Discharge: HOME | End: 2019-12-26
Payer: COMMERCIAL

## 2019-12-26 PROCEDURE — 97112 NEUROMUSCULAR REEDUCATION: CPT

## 2019-12-26 PROCEDURE — 97161 PT EVAL LOW COMPLEX 20 MIN: CPT

## 2019-12-26 ASSESSMENT — PAIN DESCRIPTION - DESCRIPTORS: DESCRIPTORS: ACHING

## 2019-12-26 ASSESSMENT — PAIN SCALES - GENERAL: PAINLEVEL_OUTOF10: 8

## 2019-12-26 ASSESSMENT — PAIN DESCRIPTION - FREQUENCY: FREQUENCY: INTERMITTENT

## 2019-12-26 ASSESSMENT — PAIN DESCRIPTION - ORIENTATION: ORIENTATION: LOWER

## 2019-12-26 ASSESSMENT — PAIN DESCRIPTION - PAIN TYPE: TYPE: CHRONIC PAIN

## 2019-12-26 ASSESSMENT — PAIN DESCRIPTION - LOCATION: LOCATION: BACK

## 2019-12-26 ASSESSMENT — PAIN DESCRIPTION - PROGRESSION: CLINICAL_PROGRESSION: GRADUALLY WORSENING

## 2019-12-30 ENCOUNTER — HOSPITAL ENCOUNTER (OUTPATIENT)
Dept: PHYSICAL THERAPY | Age: 64
Setting detail: THERAPIES SERIES
Discharge: HOME | End: 2019-12-30
Payer: COMMERCIAL

## 2019-12-30 PROCEDURE — 97110 THERAPEUTIC EXERCISES: CPT

## 2020-01-03 ENCOUNTER — HOSPITAL ENCOUNTER (OUTPATIENT)
Dept: PHYSICAL THERAPY | Age: 65
Setting detail: THERAPIES SERIES
Discharge: HOME | End: 2020-01-03
Payer: COMMERCIAL

## 2020-01-03 PROCEDURE — 97110 THERAPEUTIC EXERCISES: CPT

## 2020-01-03 RX ORDER — LEVOTHYROXINE SODIUM 0.05 MG/1
TABLET ORAL
Qty: 90 TABLET | Refills: 1 | Status: SHIPPED | OUTPATIENT
Start: 2020-01-03 | End: 2020-06-29

## 2020-01-03 NOTE — FLOWSHEET NOTE
Long term goals  Time Frame for Long term goals : 5 weeks  Long term goal 1: Pt will be independent with HEP  Long term goal 2: Pt will report 50% improvement in frequency and intensity of pain  Long term goal 3: Pt will demonstrate WFL strength B hip and lumbar stabilization  Long term goal 4: Pt will ambulate for 30 minutes without limitation to pain to return to walking group at work  Long term goal 5: Pt will improve Modified Oswestry to 20% or lower to indicate significant change     Plan: [x] Continue per plan of care [] Alter current plan (see comments)   [] Plan of care initiated [] Hold pending MD visit [] Discharge      Plan for Next Session: Correction of biomechanical dysfunctions as they present on visit. Restore proper motor firing pattern and functional muscle activation as presented on visit. Progress as tolerates.     Re-Certification Due Date:         Signature:  Hi Boswell PT

## 2020-01-06 ENCOUNTER — HOSPITAL ENCOUNTER (OUTPATIENT)
Dept: PHYSICAL THERAPY | Age: 65
Setting detail: THERAPIES SERIES
Discharge: HOME | End: 2020-01-06
Payer: COMMERCIAL

## 2020-01-06 PROCEDURE — 97110 THERAPEUTIC EXERCISES: CPT

## 2020-01-06 NOTE — FLOWSHEET NOTE
Physical Therapy Daily Treatment Note    Date:  2020    Patient Name:  Eric Pina    :  1955  MRN: [id number]  Restrictions/Precautions:    Medical/Treatment Diagnosis Information:  · Diagnosis: chronic low back pain  Insurance/Certification information:  PT Insurance Information: [employer] - medical necessity  Physician Information:  Referring Practitioner: Herminia Oconnor MD  Plan of care signed (Y/N):  Y  Visit# / total visits:  4/10     G-Code (if applicable): Modified Oswestry 34%     Medicare Cap (if applicable):  n/a = total amount used, updated 2020    Time in:   7:00     Timed Treatment: 40 Total Treatment Time:  40  Time out: 7:40  ________________________________________________________________________________________    Pain Level:    0/10  SUBJECTIVE:  Pt reports that she went to the [email or web address] yesterday and walked around for awhile and her back got tired, but it didn't hurt. First time in years that she can say that! OBJECTIVE:     Exercise/Equipment Resistance/Repetitions Other comments          TA set with BP cuff     -  Until control demo'd  10x B  10x B  10x B HEP    bridge 10x5\" HEP   Supine cross stabilization  x2 mins    Clamshell   HEP   DKTC  HEP   SLS with LE on SB 2x30\" B HEP   [employer] sergio neuro re-ed [place]    Multifidus push out 15x B    FSU with post sling 15x B    rockerboard     1' rocking, 1' balance each direction     multihip machine  15# x15 B ABD                                       TG with cross stabilization   x6 minutes                     Other Therapeutic Activities:      Manual Treatments:         Modalities:      Test/Measurements:  See initial eval       ASSESSMENT:  Pt demonstrated good control with BP cuff. Progressed to more standing exercise with early fatigue demonstrated and decreased stabilization control. Progress lumbar stabilization as patient tolerates.      Treatment/Activity Tolerance:   [x]Patient tolerated

## 2020-01-07 ENCOUNTER — OFFICE VISIT (OUTPATIENT)
Dept: DERMATOLOGY | Age: 65
End: 2020-01-07
Payer: COMMERCIAL

## 2020-01-07 PROCEDURE — 99213 OFFICE O/P EST LOW 20 MIN: CPT | Performed by: DERMATOLOGY

## 2020-01-07 NOTE — PROGRESS NOTES
mouth daily      Cholecalciferol (VITAMIN D3 PO) Take 500 mg by mouth daily       No current facility-administered medications for this visit. ROS:    General: No fevers, chills, sweats, unexplained weight loss or weight gain, fatigue, malaise   Skin: Denies additional lesions    Heme: No history of bleeding diatheses    Allergy: Denies seasonal or environmental allergies or other medication allergies     PHYSICAL EXAM:    General: Well-appearing, NAD      Integument: Examination was performed of the following and were unremarkable except as otherwise noted below:psych/neuro, scalp, hair, face, ears, conjunctivae/eyelids, gums/teeth/lips, buccal mucosa, oropharynx, neck, breast/axilla/chest, abdomen, groin, back, buttocks, RUE, LUE, RLE, LLE, digits/nails. Genital exam deferred per patient.      Abnormalities noted include:    1.) palm of L hand with a few cm lichenified eczematous patch    ASSESSMENT AND PLAN:    1.)  Subacute eczematous dermatitis, mild flaring on L hand:  -Clobetasol 0.05% cream bid prn severe flares  - Continue TAC 0.1% cream bid lesser flares; edu: patient re: side effects of topical steroids, including: skin atrophy, telangiectasias, dyspigmentation with prolonged or inappropriate use   - Edu: patient regarding preferable skin care regimen, including: bathing/showering daily in lukewarm water using a mild cleanser such as Dove to only the face, feet, and intertriginous areas. Application of topical steroid followed by liberal amounts of ointments or emollients should immediately follow bathing (e.g., Vaseline/Aquaphor, water-in-oil creams). Return to Clinic: prn  Discussed plan with patient and/or primary caretaker. Patient to call clinic with any questions / concerns. Reviewed side effects of treatment(s) and/or medication(s) with patient and/or primary caretaker.    AVS provided or is available on [employer] ____________________________________________________________________________   Jennifer Correia MD, MPH, FAAD  [facility], [address]

## 2020-01-09 ENCOUNTER — HOSPITAL ENCOUNTER (OUTPATIENT)
Age: 65
Setting detail: OUTPATIENT SURGERY
Discharge: HOME | End: 2020-01-09
Attending: INTERNAL MEDICINE | Admitting: INTERNAL MEDICINE
Payer: COMMERCIAL

## 2020-01-09 VITALS
DIASTOLIC BLOOD PRESSURE: 71 MMHG | HEART RATE: 63 BPM | SYSTOLIC BLOOD PRESSURE: 137 MMHG | RESPIRATION RATE: 16 BRPM | OXYGEN SATURATION: 95 % | HEIGHT: 67 IN | WEIGHT: 240 LBS | TEMPERATURE: 97.5 F | BODY MASS INDEX: 37.67 KG/M2

## 2020-01-09 PROCEDURE — 43239 EGD BIOPSY SINGLE/MULTIPLE: CPT | Performed by: INTERNAL MEDICINE

## 2020-01-09 PROCEDURE — 43450 DILATE ESOPHAGUS 1/MULT PASS: CPT | Performed by: INTERNAL MEDICINE

## 2020-01-09 PROCEDURE — 7100000010 HC PHASE II RECOVERY - FIRST 15 MIN: Performed by: INTERNAL MEDICINE

## 2020-01-09 PROCEDURE — 6360000002 HC RX W HCPCS: Performed by: INTERNAL MEDICINE

## 2020-01-09 PROCEDURE — 3609012400 HC EGD TRANSORAL BIOPSY SINGLE/MULTIPLE: Performed by: INTERNAL MEDICINE

## 2020-01-09 PROCEDURE — 3609017700 HC EGD DILATION GASTRIC/DUODENAL STRICTURE: Performed by: INTERNAL MEDICINE

## 2020-01-09 PROCEDURE — 99152 MOD SED SAME PHYS/QHP 5/>YRS: CPT | Performed by: INTERNAL MEDICINE

## 2020-01-09 PROCEDURE — 2580000003 HC RX 258: Performed by: INTERNAL MEDICINE

## 2020-01-09 PROCEDURE — 88305 TISSUE EXAM BY PATHOLOGIST: CPT

## 2020-01-09 PROCEDURE — 2709999900 HC NON-CHARGEABLE SUPPLY: Performed by: INTERNAL MEDICINE

## 2020-01-09 PROCEDURE — 7100000011 HC PHASE II RECOVERY - ADDTL 15 MIN: Performed by: INTERNAL MEDICINE

## 2020-01-09 RX ORDER — MIDAZOLAM HYDROCHLORIDE 5 MG/ML
INJECTION INTRAMUSCULAR; INTRAVENOUS PRN
Status: DISCONTINUED | OUTPATIENT
Start: 2020-01-09 | End: 2020-01-09 | Stop reason: ALTCHOICE

## 2020-01-09 RX ORDER — FENTANYL CITRATE 50 UG/ML
INJECTION, SOLUTION INTRAMUSCULAR; INTRAVENOUS PRN
Status: DISCONTINUED | OUTPATIENT
Start: 2020-01-09 | End: 2020-01-09 | Stop reason: ALTCHOICE

## 2020-01-09 RX ORDER — SODIUM CHLORIDE, SODIUM LACTATE, POTASSIUM CHLORIDE, CALCIUM CHLORIDE 600; 310; 30; 20 MG/100ML; MG/100ML; MG/100ML; MG/100ML
INJECTION, SOLUTION INTRAVENOUS CONTINUOUS
Status: DISCONTINUED | OUTPATIENT
Start: 2020-01-09 | End: 2020-01-09 | Stop reason: HOSPADM

## 2020-01-09 RX ORDER — SODIUM CHLORIDE, SODIUM LACTATE, POTASSIUM CHLORIDE, CALCIUM CHLORIDE 600; 310; 30; 20 MG/100ML; MG/100ML; MG/100ML; MG/100ML
INJECTION, SOLUTION INTRAVENOUS CONTINUOUS PRN
Status: COMPLETED | OUTPATIENT
Start: 2020-01-09 | End: 2020-01-09

## 2020-01-09 RX ADMIN — SODIUM CHLORIDE, POTASSIUM CHLORIDE, SODIUM LACTATE AND CALCIUM CHLORIDE: 600; 310; 30; 20 INJECTION, SOLUTION INTRAVENOUS at 07:54

## 2020-01-09 ASSESSMENT — PAIN - FUNCTIONAL ASSESSMENT: PAIN_FUNCTIONAL_ASSESSMENT: 0-10

## 2020-01-09 NOTE — H&P
Laurie Duong     [address] ,  [address] [facility]  Phone: [phone]     CHIEF COMPLAINT           Chief Complaint   Patient presents with    New Patient       GERD - pt says she was diagnosed with a hiatal hernia in 2010 and pt has been managing with diet and daily PPI use for many years. PCP has referred her here for further evaluation with EGD. Pt had esophagram approx 10 years ago which is when she found out about the hiatal hernia. Pt says omeprazole 20 mg daily usually controlls her symptoms but she doesn't want to remain on this medication everyday indefinitely. She has some dysphagia first thing in the morning.           HPI      Thank you Bonifacio Andrade MD for asking me to see Bharti Singh in consultation. She is a  [5] White [1] 59 y.o. Francois Cooley female seen independently who presents with the following GI complaints:    Bharti Singh  Complains of chronic reflux on prilosec more then 10 years and has concerns about chronic use. Previously had a prescription but was told to get it otc. Has had some recent esophageal dysphagia usually in the mornings. Has hiatal hernia diagnosed 10 years ago by [facility] but has never had a egd. Indicates she needs assistance with weight loss. Joined a program and tracked with [employer] and lost 30lbs in the past but became disenchanted when someone told her she should have lost twice as much. No pertinent GI family history.      HPI elements: location, severity, timing, modifying factors, quality, duration, context and associated signs/symptoms.     Last Encounter Reviewed:  Pertinent PMH, FH, SH is reviewed below. Last EGD: none  Last Colonoscopy: 3/31/2016 hyperplastic polyp     Review of available records reveals:       Wt Readings from Last 50 Encounters:   12/18/19 240 lb (108.9 kg)   12/13/19 241 lb (109.3 kg)   10/15/19 240 lb (108.9 kg)   10/01/19 240 lb (108.9 kg)   08/01/19 244 lb (110.7 kg) (CARDURA) 4 MG tablet Take 1 tablet by mouth nightly 90 tablet 3    furosemide (LASIX) 40 MG tablet Take 1 tablet by mouth daily 30 tablet 11    Omega-3 Fatty Acids (FISH OIL) 1000 MG CAPS Take 1 capsule by mouth daily 30 capsule 0    levothyroxine (SYNTHROID) 50 MCG tablet TAKE 1 TABLET BY MOUTH DAILY 90 tablet 1    omeprazole (PRILOSEC OTC) 20 MG tablet Take 1 tablet by mouth daily 90 tablet 1    Biotin 3 MG TABS Take 3 mg by mouth daily        Cholecalciferol (VITAMIN D3 PO) Take 500 mg by mouth daily             ALLERGIES           Allergies   Allergen Reactions    Sulfa Antibiotics Hives      IMMUNIZATIONS           Immunization History   Administered Date(s) Administered    Influenza Virus Vaccine 10/19/2015, 10/12/2017, 09/18/2018    Influenza, MDCK Quadv, IM, PF (Flucelvax 4 yrs and older) 09/19/2018, 11/18/2019    Influenza, Quadv, IM, (6 mo and older Fluzone, Flulaval, Fluarix and 3 yrs and older Afluria) 10/12/2017    Influenza, Quadv, IM, PF (6 mo and older Fluzone, Flulaval, Fluarix, and 3 yrs and older Afluria) 10/05/2016    Tdap (Boostrix, Adacel) 01/01/2012    Zoster Live (Zostavax) 10/12/2017      REVIEW OF SYSTEMS   See HPI for further details and pertinent postiives. Negative for the following:  Constitutional: Negative for weight change. Negative for appetite change and fatigue. HENT: Negative for nosebleeds, sore throat, mouth sores, and voice change. Respiratory: Negative for cough, choking and chest tightness. Cardiovascular: Negative for chest pain   Gastrointestinal: See HPI  Musculoskeletal: Negative for arthralgias. Skin: Negative for pallor. Neurological: Negative for weakness and light-headedness. Hematological: Negative for adenopathy. Does not bruise/bleed easily. Psychiatric/Behavioral: Negative for suicidal ideas.    PHYSICAL EXAM   VITAL SIGNS: /84 (Site: Left Upper Arm, Position: Sitting, Cuff Size: Large Adult)   Pulse 52   Ht 5' 7\" (1.702 m) replacement (requires heparin bridge while Coumadin held and is managed by pharmacy)         Pradaxa, Xarelto, Eliquis may be held 2-3 days prior to procedure. According to pharmacokinetics of the drug, package insert, cardiology practice patterns, and T1/2 of theses drugs (12 hrs), Eliquis and Xarelto are held 48hrs prior to any procedure, including major surgical procedures w/o          increased bleeding.  That is usually the standard of care, as coagulation would/should be normalized at 48hrs.         Every attempt should be made to maintain ASA 81mg per day throughout the liana-operative period in patients with diagnosis of ASHD.       These recommendations may need to be modified by the provider/ based on risk /benefit analysis of the procedure and the patients history.                   If anticoagulation can not be held because recent cardiac stent, elective endoscopic procedures should be delayed until they have received the minimum duration of recommended antiplatlet therapy and it can safely be held. Again if unsure, patient should discuss with prescribing physician/service.                   If anticoagulation can not be stopped, endoscopic procedures can still be performed either diagnostically at a somewhat higher risk. Understand that any therapeutic procedure where anything beyond looking is performed, carries higher risks.   For this reason without overt bleeding other testing          such as cologuard may be more appropriate.                     High risk endoscopic procedures that require stopping antiplatelet and anticoagulation therapy include polypectomy, biliary or pancreatic sphincterotomy, pneumatic or bougie dilation, PEG placement, therapeutic balloon-assisted enteroscopy, EUS and FNA, tumor ablation by any technique,          cystogastrostomy,and treatment of varices.       Order Specific Question:   Screening or Diagnostic?       Answer:   Diagnostic      Duayne Brunner was seen today for new patient.     Diagnoses and all orders for this visit:     Esophageal dysphagia  -     omeprazole (PRILOSEC) 20 MG delayed release capsule; Take 1 capsule by mouth Daily  -     EGD     Heartburn  -     omeprazole (PRILOSEC) 20 MG delayed release capsule; Take 1 capsule by mouth Daily  -     EGD        Esophageal dysphagia typically is a foreign body sensation in the esophagus mainly for solids but can progress to liquids. It is considered a warning sign. The differential of esophageal dysphagia includes acid peptic stricture, eosinophIllic esophagitis with stricture, neoplasia, oropharyngeal or  esophageal dysmotility, or medication induced/pill esophagitis. EGD is often the preferred method of evaluation as one can directly visualize the mucosa/lining, obtain biopsies and thus exact diagnosis, and perform therapeutic procedures such as esophageal dilation. The alternative to EGD, UGI was also discussed. Gastroesophageal reflux disease (GERD) can be distinguished from non-erosive reflux disease (NERD) by early EGD prior to treatment. GERD is a chronic lifelong condition for which lifelong medication is often necessary unless a correctable cause such as dietary non-compliance or often after significant weight loss. This is no different then any other treatment of any chronic condition like diabetes, htn, or hypercholesterolemia. While PPIs as a class will heal esophagitis in 4-8 weeks in 90%, 30% of people have persistent symptoms on once a day medication. Only 7% of people have persistent symptoms on twice a day medication with classic gerd symptoms. Some PPIs are more potent then others meaning they will turn off acid production in the stomach between 10-15h and therefore bid therapy is preferred over increasing single daily dosage. Specifically: Protonix<Prilosec<Nexium<Aciphex.   EGD also looks for peptic ulcer disease, eosinophilic esophagitis, hiatal hernia, or other intraluminal diseases that a large amount of people likely go unscreened. If Penaloza's not identified in high risk population, then will not be opportunity to intervene. Discussed alternatives to antisecretory therapy including hiatal hernia repair if present, Nissen fundoplication or implantation of the LINX magnetic ring at the GE junction. Some anti-secretory drugs are stronger then others.       According to a study published in Gastroenterology. 2019;157(3):682-691.e2, long-term adverse effects (AE) were similar in patients receiving pantoprazole compared with those receiving placebo. In this 3x2 partial factorial, multicenter, double-blind, randomized, placebo-controlled trial, outcomes of participants with stable cardiovascular and peripheral artery disease who were not already taking a ppi at baseline were compared. Participants were randomly assigned to protonix 40mg/d AND rivaroxaban 2.5mg/d with asa 100mg/d, rivaroxaban 5mg bid, or asa 100mg qd. Both CV events (Mi, stroke, death) and non-CV events of interest, including pneumonia, fracture, new diabetes diagnosis, chronic kidney disease, dementia, COPD, and gastric atrophy were no different in the groups after 3 years. There was an increase risk of enteric infection. The conclusion was the benefits are likely to outweight the risks for these medication when clinically indicated.     With refractory symptoms unresponsive to anti-secretory medicine, endoscopy if not already done, pH and motility testing are recommended.       ORDERED FUTURE/PENDING TESTS      Lab Frequency Next Occurrence   GABRIELE SCREENING W CAD BILATERAL 2 VW Once 12/14/2019         FOLLOWUP   Return for EGD.       Lion Diego 1/9/20 8:06 AM

## 2020-01-10 ENCOUNTER — APPOINTMENT (OUTPATIENT)
Dept: PHYSICAL THERAPY | Age: 65
End: 2020-01-10
Payer: COMMERCIAL

## 2020-01-10 NOTE — RESULT ENCOUNTER NOTE
Please call patient with esophageal biopsy results. The biopsies are more consistent with acid reflux and a PPI is recommended daily along with a GERD type diet. For eosinophilic esophagitis, there must be eosinophils at both levels. Annual follow up is recommended to screen for breakthrough symptoms and refill medication.

## 2020-01-13 ENCOUNTER — HOSPITAL ENCOUNTER (OUTPATIENT)
Dept: PHYSICAL THERAPY | Age: 65
Setting detail: THERAPIES SERIES
Discharge: HOME | End: 2020-01-13
Payer: COMMERCIAL

## 2020-01-13 PROCEDURE — 97110 THERAPEUTIC EXERCISES: CPT

## 2020-01-13 NOTE — FLOWSHEET NOTE
Physical Therapy Daily Treatment Note    Date:  2020    Patient Name:  Esthela Brar    :  1955  MRN: [id number]  Restrictions/Precautions:    Medical/Treatment Diagnosis Information:  · Diagnosis: chronic low back pain  Insurance/Certification information:  PT Insurance Information: Aetna - medical necessity  Physician Information:  Referring Practitioner: Emory Grove MD  Plan of care signed (Y/N):  Y  Visit# / total visits:  5/10     G-Code (if applicable): Modified Oswestry 34%     Medicare Cap (if applicable):  n/a = total amount used, updated 2020    Time in:   7:05     Timed Treatment: 30 Total Treatment Time:  30  Time out: 7:35  ________________________________________________________________________________________    Pain Level:    0/10  SUBJECTIVE:  Pt reports that her mid-back bothered her yesterday, but she was a little \"lazy\" following a colonoscopy     OBJECTIVE:     Exercise/Equipment Resistance/Repetitions Other comments          TA set with BP cuff     -  Until control demo'd  10x B  10x B  10x B HEP    bridge 10x5\" HEP   Supine cross stabilization      Clamshell   HEP   DKTC  HEP   SLS with LE on SB 2x30\" B HEP   Dowel sergio neuro re-ed NV    Multifidus push out 15x B    FSU with post sling 15x B    rockerboard     1' rocking, 1' balance each direction     multihip machine  15# x15 B ABD                                       TG with cross stabilization   x6 minutes                     Other Therapeutic Activities:      Manual Treatments:         Modalities:      Test/Measurements:  See initial eval       ASSESSMENT:  Pt demonstrated good control with BP cuff. Progressed to more standing exercise with early fatigue demonstrated and decreased stabilization control. Progress lumbar stabilization as patient tolerates.      Treatment/Activity Tolerance:   [x]Patient tolerated treatment well [] Patient limited by fatique  []Patient limited by

## 2020-01-17 ENCOUNTER — HOSPITAL ENCOUNTER (OUTPATIENT)
Dept: PHYSICAL THERAPY | Age: 65
Setting detail: THERAPIES SERIES
Discharge: HOME | End: 2020-01-17
Payer: COMMERCIAL

## 2020-01-17 PROCEDURE — 97110 THERAPEUTIC EXERCISES: CPT

## 2020-01-17 NOTE — FLOWSHEET NOTE
Physical Therapy Daily Treatment Note    Date:  2020    Patient Name:  Alcides Friday    :  1955  MRN: [id number]  Restrictions/Precautions:    Medical/Treatment Diagnosis Information:  · Diagnosis: chronic low back pain  Insurance/Certification information:  PT Insurance Information: Aetna - medical necessity  Physician Information:  Referring Practitioner: Isidoro Rebolledo MD  Plan of care signed (Y/N):  Y  Visit# / total visits:  6/10     G-Code (if applicable): Modified Oswestry 34%     Medicare Cap (if applicable):  n/a = total amount used, updated 2020    Time in:   7:15  Timed Treatment: 40 Total Treatment Time:  40  Time out: 7:55  ________________________________________________________________________________________    Pain Level:    0/10  SUBJECTIVE:  Pt reports that she had an unrelated procedure on Wednesday and surprisingly feels pretty good following it. Hasn't really had any back pain since we started strengthening her core. Very pleased with her progress. OBJECTIVE:     Exercise/Equipment Resistance/Repetitions Other comments          TA set with BP cuff     -    -  Until control demo'd  10x B  10x B  10x B HEP    bridge 10x5\" HEP   Supine cross stabilization      Clamshell   HEP   DKTC  HEP   SLS with LE on SB  HEP   Dowel sergio neuro re-ed x5 minutes Including seated trunk flexion and sit<>stand; add squat/lunge NV   Multifidus push out 15x B  With walk out 10x B    FSU with post sling 15x B    Lateral post sling 15x B    rockerboard     1' rocking, 1' balance each direction     multihip machine  15# x15 B ABD                                       TG with cross stabilization   x6 minutes                     Other Therapeutic Activities:      Manual Treatments:         Modalities:      Test/Measurements:  See initial eval       ASSESSMENT:  Pt demonstrated good control with BP cuff.  Progressed to more standing exercise with early fatigue demonstrated and decreased stabilization control. Progress lumbar stabilization as patient tolerates. Treatment/Activity Tolerance:   [x]Patient tolerated treatment well [] Patient limited by fatique  []Patient limited by pain [] Patient limited by other medical complications  [] Other:     Goals:          Long term goals  Time Frame for Long term goals : 5 weeks  Long term goal 1: Pt will be independent with HEP  Long term goal 2: Pt will report 50% improvement in frequency and intensity of pain  Long term goal 3: Pt will demonstrate WFL strength B hip and lumbar stabilization  Long term goal 4: Pt will ambulate for 30 minutes without limitation to pain to return to walking group at work  Long term goal 5: Pt will improve Modified Oswestry to 20% or lower to indicate significant change     Plan: [x] Continue per plan of care [] Alter current plan (see comments)   [] Plan of care initiated [] Hold pending MD visit [] Discharge      Plan for Next Session: Correction of biomechanical dysfunctions as they present on visit. Restore proper motor firing pattern and functional muscle activation as presented on visit. Progress as tolerates.     Re-Certification Due Date:         Signature:  Rita Love , PT

## 2020-01-20 ENCOUNTER — HOSPITAL ENCOUNTER (OUTPATIENT)
Dept: PHYSICAL THERAPY | Age: 65
Setting detail: THERAPIES SERIES
Discharge: HOME | End: 2020-01-20
Payer: COMMERCIAL

## 2020-01-20 PROCEDURE — 97110 THERAPEUTIC EXERCISES: CPT

## 2020-01-20 NOTE — FLOWSHEET NOTE
Physical Therapy Daily Treatment Note    Date:  2020    Patient Name:  Terra Reyes    :  1955  MRN: [id number]  Restrictions/Precautions:    Medical/Treatment Diagnosis Information:  · Diagnosis: chronic low back pain  Insurance/Certification information:  PT Insurance Information: Aetna - medical necessity  Physician Information:  Referring Practitioner: Sabiha Baez MD  Plan of care signed (Y/N):  Y  Visit# / total visits:  7/10     G-Code (if applicable): Modified Oswestry 34%     Medicare Cap (if applicable):  n/a = total amount used, updated 2020    Time in:   7:20  Timed Treatment: 40 Total Treatment Time:  40  Time out: 7:55  ________________________________________________________________________________________    Pain Level:    0/10  SUBJECTIVE:  LBP is only hurting when she's doing something like doing house work, washing the dog or standing for longer periods of time but reports the pain has been reduced. She feels like the exercises have been helping her.     OBJECTIVE:     Exercise/Equipment Resistance/Repetitions Other comments          TA set with BP cuff     -  Until control demo'd  10x B  10x B  10x B HEP    bridge 10x5\" HEP   Supine cross stabilization      Clamshell   HEP   DKTC  HEP   SLS with LE on SB  HEP   Dowel sergio neuro re-ed x5 minutes Including seated trunk flexion and sit<>stand; add squat/lunge NV   Multifidus push out 15x B      FSU with post sling 15x B    Lateral post sling 15x B    rockerboard     1' rocking, 1' balance each direction     multihip machine  15# 2x15 B ABD      Standing S' extension x15 maroon band       Standing Rows x15 maroon band      Standing multifidus x15 maroon band                  TG with cross stabilization   x6 minutes                     Other Therapeutic Activities:      Manual Treatments:         Modalities:      Test/Measurements:         ASSESSMENT:   Pt doing well with current exercises and is reporting less pain with everyday activities. Progress exercise program to pt tolerance. Treatment/Activity Tolerance:   [x]Patient tolerated treatment well [] Patient limited by fatique  []Patient limited by pain [] Patient limited by other medical complications  [] Other:     Goals:          Long term goals  Time Frame for Long term goals : 5 weeks  Long term goal 1: Pt will be independent with HEP  Long term goal 2: Pt will report 50% improvement in frequency and intensity of pain  Long term goal 3: Pt will demonstrate WFL strength B hip and lumbar stabilization  Long term goal 4: Pt will ambulate for 30 minutes without limitation to pain to return to walking group at work  Long term goal 5: Pt will improve Modified Oswestry to 20% or lower to indicate significant change     Plan: [x] Continue per plan of care [] Alter current plan (see comments)   [] Plan of care initiated [] Hold pending MD visit [] Discharge      Plan for Next Session: Correction of biomechanical dysfunctions as they present on visit. Restore proper motor firing pattern and functional muscle activation as presented on visit. Progress as tolerates. Re-Certification Due Date:         Signature:  ANUP Rodgers SPT   Physical Therapist was present, directed the patient's care, made skilled judgement, and was responsible for assessment and treatment of the patient.

## 2020-01-24 ENCOUNTER — APPOINTMENT (OUTPATIENT)
Dept: PHYSICAL THERAPY | Age: 65
End: 2020-01-24
Payer: COMMERCIAL

## 2020-01-24 ENCOUNTER — HOSPITAL ENCOUNTER (OUTPATIENT)
Dept: PHYSICAL THERAPY | Age: 65
Setting detail: THERAPIES SERIES
Discharge: HOME | End: 2020-01-24
Payer: COMMERCIAL

## 2020-01-24 PROCEDURE — 97110 THERAPEUTIC EXERCISES: CPT

## 2020-01-24 PROCEDURE — 97112 NEUROMUSCULAR REEDUCATION: CPT

## 2020-01-24 NOTE — FLOWSHEET NOTE
band Cueing for lumbopelvic stability          TG with cross stabilization         calf stretch, slant board 2x30\" B Cues to keep bottom tucked under            Other Therapeutic Activities: Pt education on bending knees when stooping over to reduce stress on the lumbar spine and SI region. Progressed core and hip strengthening with adding a neuromuscular upriht challenge of steamboats which she has to use trans ab and concentrate on maintaining lumbopelvic stability while moving LE. Manual Treatments:         Modalities:      Test/Measurements:  Hamstring SLR: Bilat 80deg with reports of calf stretch being limiting factor. ASSESSMENT:   Pt felt the sidelying leg swing and the steamboats helped her to better realize how to use core to stabilize her lumboplevic region during activities such as walking. She hadn't realized how much she was allowing her back \"to give\" particularly with hip ext motions. Treatment/Activity Tolerance:   [x]Patient tolerated treatment well [] Patient limited by fatique  []Patient limited by pain [] Patient limited by other medical complications  [] Other:     Goals:          Long term goals  Time Frame for Long term goals : 5 weeks  Long term goal 1: Pt will be independent with HEP  Long term goal 2: Pt will report 50% improvement in frequency and intensity of pain  Long term goal 3: Pt will demonstrate WFL strength B hip and lumbar stabilization  Long term goal 4: Pt will ambulate for 30 minutes without limitation to pain to return to walking group at work  Long term goal 5: Pt will improve Modified Oswestry to 20% or lower to indicate significant change     Plan: [x] Continue per plan of care [] Alter current plan (see comments)   [] Plan of care initiated [] Hold pending MD visit [] Discharge      Plan for Next Session: Correction of biomechanical dysfunctions as they present on visit.   Restore proper motor firing pattern and functional muscle activation as presented on

## 2020-01-27 ENCOUNTER — HOSPITAL ENCOUNTER (OUTPATIENT)
Dept: PHYSICAL THERAPY | Age: 65
Setting detail: THERAPIES SERIES
Discharge: HOME | End: 2020-01-27
Payer: COMMERCIAL

## 2020-01-27 PROCEDURE — 97110 THERAPEUTIC EXERCISES: CPT

## 2020-01-27 NOTE — FLOWSHEET NOTE
knees when stooping over to reduce stress on the lumbar spine and SI region. Progressed core and hip strengthening with adding a neuromuscular upriht challenge of steamboats which she has to use trans ab and concentrate on maintaining lumbopelvic stability while moving LE. Manual Treatments:         Modalities:      Test/Measurements:  Hamstring SLR: Bilat 80deg with reports of calf stretch being limiting factor. ASSESSMENT:   Pt continues to demonstrates difficulty with lumbo-pelvic stabilization with supine activities. Dowel sergio training initiated with TA exercises supine for visual feedback to which pt responded well. Continue lumbo-pelvic stabilization and incorporate dowel sergio neuro re-ed training with functional movement NV. Treatment/Activity Tolerance:   [x]Patient tolerated treatment well [] Patient limited by fatique  []Patient limited by pain [] Patient limited by other medical complications  [] Other:     Goals:          Long term goals  Time Frame for Long term goals : 5 weeks  Long term goal 1: Pt will be independent with HEP  Long term goal 2: Pt will report 50% improvement in frequency and intensity of pain  Long term goal 3: Pt will demonstrate WFL strength B hip and lumbar stabilization  Long term goal 4: Pt will ambulate for 30 minutes without limitation to pain to return to walking group at work  Long term goal 5: Pt will improve Modified Oswestry to 20% or lower to indicate significant change     Plan: [x] Continue per plan of care [] Alter current plan (see comments)   [] Plan of care initiated [] Hold pending MD visit [] Discharge      Plan for Next Session: Correction of biomechanical dysfunctions as they present on visit. Restore proper motor firing pattern and functional muscle activation as presented on visit. Progress as tolerates.     Re-Certification Due Date:         Signature:  ANUP Serrano SPT  Physical Therapist was present, directed the patient's care, made skilled judgement, and was responsible for assessment and treatment of the patient.

## 2020-01-31 ENCOUNTER — HOSPITAL ENCOUNTER (OUTPATIENT)
Dept: PHYSICAL THERAPY | Age: 65
Setting detail: THERAPIES SERIES
Discharge: HOME | End: 2020-01-31
Payer: COMMERCIAL

## 2020-01-31 PROCEDURE — 97110 THERAPEUTIC EXERCISES: CPT

## 2020-01-31 NOTE — FLOWSHEET NOTE
(see comments)   [] Plan of care initiated [] Hold pending MD visit [] Discharge      Plan for Next Session: Correction of biomechanical dysfunctions as they present on visit. Restore proper motor firing pattern and functional muscle activation as presented on visit. Progress as tolerates. Re-Certification Due Date:     TODAY    Requesting 4 more PT visits at 1x/week to improve deficits. Signature:  Buddy Trejo PT      If you have any questions or concerns, please don't hesitate to call.   Thank you for your referral.    Physician Signature:________________________________Date:__________________  By signing above, therapists plan is approved by physician

## 2020-02-04 ENCOUNTER — HOSPITAL ENCOUNTER (OUTPATIENT)
Dept: PHYSICAL THERAPY | Age: 65
Setting detail: THERAPIES SERIES
Discharge: HOME | End: 2020-02-04
Payer: COMMERCIAL

## 2020-02-07 ENCOUNTER — APPOINTMENT (OUTPATIENT)
Dept: PHYSICAL THERAPY | Age: 65
End: 2020-02-07
Payer: COMMERCIAL

## 2020-02-11 ENCOUNTER — APPOINTMENT (OUTPATIENT)
Dept: PHYSICAL THERAPY | Age: 65
End: 2020-02-11
Payer: COMMERCIAL

## 2020-02-11 ENCOUNTER — HOSPITAL ENCOUNTER (OUTPATIENT)
Dept: PHYSICAL THERAPY | Age: 65
Setting detail: THERAPIES SERIES
Discharge: HOME | End: 2020-02-11
Payer: COMMERCIAL

## 2020-02-11 PROCEDURE — 97110 THERAPEUTIC EXERCISES: CPT

## 2020-02-11 NOTE — FLOWSHEET NOTE
Physical Therapy Daily Treatment and Re-assessment Note    Date:  2020    Patient Name:  Emeka Weathers    :  1955  MRN: [id number]  Restrictions/Precautions:  Universal   Medical/Treatment Diagnosis Information:  · Diagnosis: chronic low back pain  Insurance/Certification information:  PT Insurance Information: Aetna - medical necessity  Physician Information:  Referring Practitioner: Valerie Hope MD  Plan of care signed (Y/N):  Y  Visit# / total visits:  10/10 1/4    G-Code (if applicable): Modified Oswestry: 30% (was 34%)     Medicare Cap (if applicable):  n/a = total amount used, updated 2020    Time in:   3:30  Timed Treatment: 30 Total Treatment Time:  30  ________________________________________________________________________________________    Pain Level:    0/10  SUBJECTIVE:  Pt reports that she is doing okay.     OBJECTIVE:     Exercise/Equipment Resistance/Repetitions Other comments          TA set    -    - march   - SLR   10x B  10x B with dowel sergio   HEP    Bridge w/ abd 2x10x5\"  Green tband HEP   Sidelying trans ab leg swing     Supine cross stabilization  x3 mins    Clamshell   HEP   DKTC  HEP   SLS with LE on SB 2x30\" B HEP   Dowel sergio neuro re-ed x5 minutes Including seated trunk flexion and sit<>stand; add squat/lunge NV   Multifidus push out 15x B      FSU with post sling     Lateral post sling     rockerboard         multihip machine        Standing S' extension x15 maroon band       Standing Rows       Standing multifidus x15 maroon band    Steamboats with trans ab   2x10 ea dir B, yellow band Cueing for lumbopelvic stability          TG with cross stabilization   x6 minutes       calf stretch, slant board  Cues to keep bottom tucked under            Other Therapeutic Activities:     Manual Treatments:         Modalities:      Test/Measurements:    Strength RLE   R Hip Flexion: 4/5 (was 4-/5)   R Hip Extension: 4-/5   R Hip ABduction: 4/5   R Hip ADduction: 4-/5   R Hip Internal Rotation: 4/5 (was 4-/5)   R Hip External Rotation: 4/5 (was 3+/5)   R Knee Flexion: 5/5   R Knee Extension: 5/5   R Ankle Dorsiflexion: 5/5   R Ankle Eversion: 5/5  Strength LLE   L Hip Flexion: 4/5 (was 4-/5)   L Hip Extension: 4-/5   L Hip ABduction: 4/5   L Hip ADduction: 4-/5   L Hip Internal Rotation: 4/5 (was 4-/5)   L Hip External Rotation: 4/5 (was 3+/5)   L Knee Flexion: 5/5   L Knee Extension: 5/5   L Ankle Dorsiflexion: 5/5   L Ankle Eversion: 5/5       ASSESSMENT:  Pt continues to demonstrates difficulty with lumbo-pelvic stabilization with supine activities. Dowel sergio training initiated with TA exercises supine for visual feedback to which pt responded well. Continue lumbo-pelvic stabilization and incorporate dowel sergio neuro re-ed training with functional movement NV. Treatment/Activity Tolerance:   [x]Patient tolerated treatment well [] Patient limited by fatique  []Patient limited by pain [] Patient limited by other medical complications  [] Other:     Goals:    Long term goals  Time Frame for Long term goals : 5 weeks  Long term goal 1: Pt will be independent with HEP  Long term goal 2: Pt will report 50% improvement in frequency and intensity of pain  Long term goal 3: Pt will demonstrate WFL strength B hip and lumbar stabilization  Long term goal 4: Pt will ambulate for 30 minutes without limitation to pain to return to walking group at work  Long term goal 5: Pt will improve Modified Oswestry to 20% or lower to indicate significant change     Plan: [x] Continue per plan of care [] Alter current plan (see comments)   [] Plan of care initiated [] Hold pending MD visit [] Discharge      Plan for Next Session: Correction of biomechanical dysfunctions as they present on visit. Restore proper motor firing pattern and functional muscle activation as presented on visit. Progress as tolerates.     Re-Certification Due Date:     TODAY      Signature:  Kim Li

## 2020-02-14 ENCOUNTER — APPOINTMENT (OUTPATIENT)
Dept: PHYSICAL THERAPY | Age: 65
End: 2020-02-14
Payer: COMMERCIAL

## 2020-02-25 ENCOUNTER — HOSPITAL ENCOUNTER (OUTPATIENT)
Dept: PHYSICAL THERAPY | Age: 65
Setting detail: THERAPIES SERIES
Discharge: HOME | End: 2020-02-25
Payer: COMMERCIAL

## 2020-02-25 PROCEDURE — 97110 THERAPEUTIC EXERCISES: CPT

## 2020-02-25 NOTE — FLOWSHEET NOTE
Physical Therapy Daily Treatment Note    Date:  2020    Patient Name:  Alcides Friday    :  1955  MRN: [id number]  Restrictions/Precautions:  Universal   Medical/Treatment Diagnosis Information:  · Diagnosis: chronic low back pain  Insurance/Certification information:  PT Insurance Information: Aetna - medical necessity  Physician Information:  Referring Practitioner: Isidoro Rebolledo MD  Plan of care signed (Y/N):  Y  Visit# / total visits:  10/10 2/4    G-Code (if applicable): Modified Oswestry: 30% (was 34%)     Medicare Cap (if applicable):  n/a = total amount used, updated 2020    Time in:   8:00a  Timed Treatment: 30min. Total Treatment Time:  30min.  ________________________________________________________________________________________    Pain Level:    0/10  SUBJECTIVE:  Patient reports \"I have been doing my exercises some\". Reports \"my back is just sore right now but not bad\".       OBJECTIVE:     Exercise/Equipment Resistance/Repetitions Other comments          TA set    -    - march   - SLR   10x B  10x B with dowel sergio   HEP    Bridge w/ abd 2x10x5\"  Green tband HEP   Sidelying trans ab leg swing     Supine cross stabilization  x3 mins    Clamshell   HEP   DKTC  HEP   SLS with LE on SB  HEP   Dowel sergio neuro re-ed x5 minutes Including seated trunk flexion and sit<>stand; add squat/lunge NV   Multifidus push out 15x B      FSU with post sling     Lateral post sling     rockerboard         multihip machine        Standing S' extension x15 maroon band       Standing Rows       Standing multifidus x15 maroon band    Steamboats with trans ab   2x10 ea dir B, yellow band Cueing for lumbopelvic stability          TG with cross stabilization   x6 minutes       calf stretch, slant board  Cues to keep bottom tucked under            Other Therapeutic Activities:     Manual Treatments:         Modalities:      Test/Measurements:  See re-eval on        ASSESSMENT:  Patient

## 2020-03-05 ENCOUNTER — HOSPITAL ENCOUNTER (OUTPATIENT)
Dept: PHYSICAL THERAPY | Age: 65
Setting detail: THERAPIES SERIES
Discharge: HOME | End: 2020-03-05
Payer: COMMERCIAL

## 2020-03-05 PROCEDURE — 97110 THERAPEUTIC EXERCISES: CPT

## 2020-03-05 NOTE — FLOWSHEET NOTE
Physical Therapy Daily Treatment Note    Date:  3/5/2020    Patient Name:  Bharti Singh    :  1955  MRN: [id number]  Restrictions/Precautions:  Universal   Medical/Treatment Diagnosis Information:  · Diagnosis: chronic low back pain  Insurance/Certification information:  PT Insurance Information: Aetna - medical necessity  Physician Information:  Referring Practitioner: Chance Graves MD  Plan of care signed (Y/N):  Y  Visit# / total visits:  10/10 3/4    G-Code (if applicable): Modified Oswestry: 30% (was 34%)     Medicare Cap (if applicable):  n/a = total amount used, updated 3/5/2020    Time in:   8:20a  Timed Treatment: 25min. Total Treatment Time:  25min.  ________________________________________________________________________________________    Pain Level:    0/10  SUBJECTIVE:  Patient reports \"I don't do the exercises like I should\". \"I did the walk that hurt me the first time and it didn't hurt the same\". Reports non-compliance with HEP.       OBJECTIVE:     Exercise/Equipment Resistance/Repetitions Other comments          TA set    -    - SLR   10x B  10x B with dowel sergio   HEP    Bridge w/ abd 2x10x5\"  Green tband HEP   Sidelying trans ab leg swing     Supine cross stabilization  x3 mins    Clamshell   HEP   DKTC  HEP   SLS with LE on SB  HEP   Dowel sergio neuro re-ed  Including seated trunk flexion and sit<>stand   Multifidus push out 15x B  With walk out 15x B    FSU with post sling     Lateral post sling     rockerboard         multihip machine  15# 2x15 B ABD      Standing S' extension x15 maroon band       Standing Rows          Steamboats with trans ab   2x10 ea dir B, yellow band Cueing for lumbopelvic stability          TG with cross stabilization   x6 minutes       calf stretch, slant board  Cues to keep bottom tucked under            Other Therapeutic Activities:     Manual Treatments:         Modalities:      Test/Measurements:  See re-eval on  ASSESSMENT:  Patient performed above TE well with no increase in pain. The patient is admitting non-compliance with HEP, encouraged patient to increase compliance. Continues with difficulty with lumbo-pelvic stabilization with standing activities. Treatment/Activity Tolerance:   [x]Patient tolerated treatment well [] Patient limited by fatique  []Patient limited by pain [] Patient limited by other medical complications  [] Other:     Goals:    Long term goals  Time Frame for Long term goals : 5 weeks  Long term goal 1: Pt will be independent with HEP  Long term goal 2: Pt will report 50% improvement in frequency and intensity of pain  Long term goal 3: Pt will demonstrate WFL strength B hip and lumbar stabilization  Long term goal 4: Pt will ambulate for 30 minutes without limitation to pain to return to walking group at work  Long term goal 5: Pt will improve Modified Oswestry to 20% or lower to indicate significant change     Plan: [x] Continue per plan of care [] Alter current plan (see comments)   [] Plan of care initiated [] Hold pending MD visit [] Discharge      Plan for Next Session: Correction of biomechanical dysfunctions as they present on visit. Restore proper motor firing pattern and functional muscle activation as presented on visit. Progress as tolerates.     Re-Certification Due Date:   Visit 12      Signature:  Marcelino Primrose , PT

## 2020-06-12 RX ORDER — FUROSEMIDE 40 MG/1
TABLET ORAL
Qty: 90 TABLET | Refills: 3 | Status: SHIPPED | OUTPATIENT
Start: 2020-06-12 | End: 2021-05-07

## 2020-07-02 ENCOUNTER — E-VISIT (OUTPATIENT)
Dept: FAMILY MEDICINE CLINIC | Age: 65
End: 2020-07-02

## 2020-07-08 ENCOUNTER — TELEMEDICINE (OUTPATIENT)
Dept: FAMILY MEDICINE CLINIC | Age: 65
End: 2020-07-08
Payer: COMMERCIAL

## 2020-07-08 PROCEDURE — 99214 OFFICE O/P EST MOD 30 MIN: CPT | Performed by: FAMILY MEDICINE

## 2020-07-08 ASSESSMENT — ENCOUNTER SYMPTOMS: SHORTNESS OF BREATH: 0

## 2020-07-08 NOTE — PROGRESS NOTES
2020    TELEHEALTH EVALUATION -- Audio/Visual (During [facility]ST[facility]-76 public health emergency)    HPI:    Rigo Reveles (:  1955) has requested an audio/video evaluation for the following concern(s):    History of hypertension reports compliance with medications without side effects. Has been monitoring her blood pressure at home and it has been 120's/70's       History of hypothyroidism admits to missing the dose sometimes.     History of diet-controlled diabetes.     History of GERD for many years sometimes has trouble swallowing. Denies melena, hematochezia or weight loss.       Review of Systems   Constitutional: Negative for chills and fever. Respiratory: Negative for shortness of breath. Cardiovascular: Negative for chest pain. Prior to Visit Medications    Medication Sig Taking? Authorizing Provider   levothyroxine (SYNTHROID) 50 MCG tablet TAKE 1 TABLET BY MOUTH EVERY DAY  Skyla Gonzalez MD   furosemide (LASIX) 40 MG tablet TAKE 1 TABLET BY MOUTH EVERY DAY  Lisa Castro MD   hydrochlorothiazide (HYDRODIURIL) 12.5 MG tablet Take 1 tablet by mouth daily  Lisa Castro MD   losartan (COZAAR) 100 MG tablet Take 1 tablet by mouth daily  Lisa Castro MD   Efinaconazole (JUBLIA) 10 % SOLN Apply once daily to affected toenail  Fidencio Oliva MD   clobetasol (TEMOVATE) 0.05 % cream Apply to affected area twice daily for up to 2 weeks or until improved.   Fidencio Oliva MD   metoprolol succinate (TOPROL XL) 25 MG extended release tablet Take 1 tablet by mouth daily  Lisa Castro MD   doxazosin (CARDURA) 4 MG tablet Take 1 tablet by mouth nightly  Lisa Castro MD   Omega-3 Fatty Acids (FISH OIL) 1000 MG CAPS Take 1 capsule by mouth daily  Tish Eisenmenger, APRN - CNP   omeprazole (PRILOSEC OTC) 20 MG tablet Take 1 tablet by mouth daily  Michelle Louise MD   Biotin 3 MG TABS Take 3 mg by mouth daily  Historical Provider, MD   Cholecalciferol (VITAMIN D3 PO) Take 500 mg by mouth daily  Historical Provider, MD       Social History     Tobacco Use    Smoking status: Never Smoker    Smokeless tobacco: Never Used   Substance Use Topics    Alcohol use: Yes     Alcohol/week: 0.0 standard drinks     Frequency: Monthly or less    Drug use: Never        Allergies   Allergen Reactions    Sulfa Antibiotics Hives       PHYSICAL EXAMINATION:  [ INSTRUCTIONS:  \"[x]\" Indicates a positive item  \"[]\" Indicates a negative item  -- DELETE ALL ITEMS NOT EXAMINED]  Vital Signs: (As obtained by patient/caregiver or practitioner observation)    Blood pressure-  Heart rate-    Respiratory rate-    Temperature-  Pulse oximetry-     Constitutional: [x] Appears well-developed and well-nourished [x] No apparent distress      [] Abnormal-   Mental status  [x] Alert and awake  [x] Oriented to person/place/time [x]Able to follow commands      Eyes:  EOM    [x]  Normal  [] Abnormal-  Sclera  [x]  Normal  [] Abnormal -         Discharge []  None visible  [] Abnormal -    HENT:   [x] Normocephalic, atraumatic. [] Abnormal   [] Mouth/Throat: Mucous membranes are moist.     External Ears [] Normal  [] Abnormal-     Neck: [] No visualized mass     Pulmonary/Chest: [x] Respiratory effort normal.  [x] No visualized signs of difficulty breathing or respiratory distress        [] Abnormal-      Musculoskeletal:   [] Normal gait with no signs of ataxia         [] Normal range of motion of neck        [] Abnormal-       Neurological:        [x] No Facial Asymmetry (Cranial nerve 7 motor function) (limited exam to video visit)          [x] No gaze palsy        [] Abnormal-         Skin:        [] No significant exanthematous lesions or discoloration noted on facial skin         [] Abnormal-            Psychiatric:       [x] Normal Affect [x] No Hallucinations        [] Abnormal-     Other pertinent observable physical exam findings-     ASSESSMENT/PLAN:  1. Acquired hypothyroidism  - TSH without Reflex;  Future  - Comprehensive Metabolic Panel; Future    2. Essential hypertension  - Comprehensive Metabolic Panel; Future    3. Diet-controlled type 2 diabetes mellitus (HCC)  - HEMOGLOBIN A1C; Future  - Comprehensive Metabolic Panel; Future      Return in about 6 months (around 1/8/2021). Louise Yee is a 59 y.o. female being evaluated by a Virtual Visit (video visit) encounter to address concerns as mentioned above. A caregiver was present when appropriate. Due to this being a TeleHealth encounter (During [facility]- public [facility] emergency), evaluation of the following organ systems was limited: Vitals/Constitutional/EENT/Resp/CV/GI//MS/Neuro/Skin/Heme-Lymph-Imm. Pursuant to the emergency declaration under the [address] [place] [address] authority and the [employer] and [employer] Act, this Virtual Visit was conducted with patient's (and/or legal guardian's) consent, to reduce the patient's risk of exposure to COVID-19 and provide necessary medical care. The patient (and/or legal guardian) has also been advised to contact this office for worsening conditions or problems, and seek emergency medical treatment and/or call 911 if deemed necessary. Patient identification was verified at the start of the visit: Yes    Total time spent on this encounter: Not billed by time    Services were provided through a video synchronous discussion virtually to substitute for in-person clinic visit. Patient and provider were located at their individual homes. --Bita Simon MD on 7/8/2020 at 11:44 AM    An electronic signature was used to authenticate this note.

## 2020-07-10 DIAGNOSIS — E03.9 ACQUIRED HYPOTHYROIDISM: ICD-10-CM

## 2020-07-10 DIAGNOSIS — E11.9 DIET-CONTROLLED TYPE 2 DIABETES MELLITUS (HCC): ICD-10-CM

## 2020-07-10 DIAGNOSIS — I10 ESSENTIAL HYPERTENSION: ICD-10-CM

## 2020-07-11 LAB
A/G RATIO: 1.8 (ref 1.1–2.2)
ALBUMIN SERPL-MCNC: 4.2 G/DL (ref 3.4–5)
ALP BLD-CCNC: 87 U/L (ref 40–129)
ALT SERPL-CCNC: 22 U/L (ref 10–40)
ANION GAP SERPL CALCULATED.3IONS-SCNC: 13 MMOL/L (ref 3–16)
AST SERPL-CCNC: 18 U/L (ref 15–37)
BILIRUB SERPL-MCNC: 0.5 MG/DL (ref 0–1)
BUN BLDV-MCNC: 22 MG/DL (ref 7–20)
CALCIUM SERPL-MCNC: 9.7 MG/DL (ref 8.3–10.6)
CHLORIDE BLD-SCNC: 100 MMOL/L (ref 99–110)
CO2: 26 MMOL/L (ref 21–32)
CREAT SERPL-MCNC: 1.1 MG/DL (ref 0.6–1.2)
ESTIMATED AVERAGE GLUCOSE: 154.2 MG/DL
GFR AFRICAN AMERICAN: >60
GFR NON-AFRICAN AMERICAN: 50
GLOBULIN: 2.3 G/DL
GLUCOSE BLD-MCNC: 144 MG/DL (ref 70–99)
HBA1C MFR BLD: 7 %
POTASSIUM SERPL-SCNC: 4.8 MMOL/L (ref 3.5–5.1)
SODIUM BLD-SCNC: 139 MMOL/L (ref 136–145)
TOTAL PROTEIN: 6.5 G/DL (ref 6.4–8.2)
TSH SERPL DL<=0.05 MIU/L-ACNC: 3.12 UIU/ML (ref 0.27–4.2)

## 2020-07-29 ENCOUNTER — HOSPITAL ENCOUNTER (OUTPATIENT)
Age: 65
Discharge: HOME | End: 2020-07-29
Payer: COMMERCIAL

## 2020-07-29 LAB
BACTERIA: ABNORMAL /HPF
CORTISOL - AM: 10.9 UG/DL (ref 4.3–22.4)
CREATININE URINE: 207.2 MG/DL (ref 28–259)
EPITHELIAL CELLS, UA: ABNORMAL /HPF (ref 0–5)
MICROALBUMIN UR-MCNC: <1.2 MG/DL
MICROALBUMIN/CREAT UR-RTO: NORMAL MG/G (ref 0–30)
PRO-BNP: 82 PG/ML (ref 0–124)
RBC UA: ABNORMAL /HPF (ref 0–4)
URINE TYPE: ABNORMAL
WBC UA: ABNORMAL /HPF (ref 0–5)

## 2020-07-29 PROCEDURE — 84244 ASSAY OF RENIN: CPT

## 2020-07-29 PROCEDURE — 83835 ASSAY OF METANEPHRINES: CPT

## 2020-07-29 PROCEDURE — 82088 ASSAY OF ALDOSTERONE: CPT

## 2020-07-29 PROCEDURE — 82533 TOTAL CORTISOL: CPT

## 2020-07-29 PROCEDURE — 82570 ASSAY OF URINE CREATININE: CPT

## 2020-07-29 PROCEDURE — 83880 ASSAY OF NATRIURETIC PEPTIDE: CPT

## 2020-07-29 PROCEDURE — 82043 UR ALBUMIN QUANTITATIVE: CPT

## 2020-07-29 PROCEDURE — 36415 COLL VENOUS BLD VENIPUNCTURE: CPT

## 2020-07-29 PROCEDURE — 81015 MICROSCOPIC EXAM OF URINE: CPT

## 2020-07-31 ENCOUNTER — NUTRITION (OUTPATIENT)
Dept: FAMILY MEDICINE CLINIC | Age: 65
End: 2020-07-31
Payer: COMMERCIAL

## 2020-07-31 VITALS
OXYGEN SATURATION: 97 % | TEMPERATURE: 97.5 F | SYSTOLIC BLOOD PRESSURE: 118 MMHG | DIASTOLIC BLOOD PRESSURE: 70 MMHG | BODY MASS INDEX: 37.4 KG/M2 | WEIGHT: 238.8 LBS | HEART RATE: 52 BPM

## 2020-07-31 LAB — ALDOSTERONE: 22 NG/DL

## 2020-07-31 PROCEDURE — 3051F HG A1C>EQUAL 7.0%<8.0%: CPT | Performed by: NURSE PRACTITIONER

## 2020-07-31 PROCEDURE — 99214 OFFICE O/P EST MOD 30 MIN: CPT | Performed by: NURSE PRACTITIONER

## 2020-07-31 ASSESSMENT — PATIENT HEALTH QUESTIONNAIRE - PHQ9
SUM OF ALL RESPONSES TO PHQ9 QUESTIONS 1 & 2: 0
2. FEELING DOWN, DEPRESSED OR HOPELESS: 0
1. LITTLE INTEREST OR PLEASURE IN DOING THINGS: 0
SUM OF ALL RESPONSES TO PHQ QUESTIONS 1-9: 0
SUM OF ALL RESPONSES TO PHQ QUESTIONS 1-9: 0

## 2020-07-31 ASSESSMENT — ENCOUNTER SYMPTOMS
DIARRHEA: 0
BLOOD IN STOOL: 0
NAUSEA: 0

## 2020-07-31 NOTE — PROGRESS NOTES
OUTPATIENT PROGRESS NOTE  Date of Service:  2020  Address: [facility]  [facility]  [address] [place] [id number]  Dept: [phone]  Loc: [phone]    Subjective:      Patient ID:  <[id number]>  Myra Garcia is a 72 y.o. 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 Pt has 2 biologic childeren but 10 kids she calls her own. Pt did not struggle with weight until she was an adult. Pt has tried myfitness pal with success but not as much as she would like. New Diet Counselin. What do you do for a living?    - IT working from home. 2. Do you eat out a lot or cook?    - eat out once and a while, less since covid. Pt said if she has to be out and get something, pt will stop at fast food. Likes sonic, taco bell, chick la a.   3. What kind of foods do you like?    - pt does pretty much like everything but kale and tilpia. 4. What is your nutrition background?    - pt has done the myfitness pal in the past  Years ago. 5. How much do you work out currently?    - pt does not workout regularly, pt said she is eating more at home. 6. What is a good goal weight from your perspective?    - pt would like to be 150.    - realistic goal - 170. First goal is 220.   7. Do you have a family and kids? - pt has 10 kids - - 1 kid sometimes lives with her, pt does live alone. 8. How much water do you drink?   - pt maybe drinks 6 glasses a day. 9. Do you drink soda? If so how much?   - no   10. What challenges have you had in the past for losing weight? What worked what did not work?    - pt has used myfitness pal.   11. Do you understand how to read a food label?    - yes   12. How many fruits and vegetables do you eat currently?   - 2 servings a day   13. Do you eat breakfast?   - yes   14. Do you eat late at night? How long before bed do you eat?    - no snacking after dinner   15. Do you understand how to measure heart rate?    16. What are your short term and long term goals? - to lose weight, decrease a1c, help hypertension, become more active, run a race  17. Do you drink caffeine? If so how much? And what is the caffeine, Coffee or tea? Cream and sugar?    - coffee, small amount cream.   18. What do they cook with? What kind of oil, butter?    - coconut oil. 19. What are barriers you have for working out? What prevents you from getting to work out regularly?    - arthritis in back makes painful to walk, doing minimal house work,   20. What do you do when youre stressed out? Do you eat if so what kind of foods do you eat?    - stress or board eater, sweet and salty. Review of Systems   Constitutional: Negative for diaphoresis and fatigue. Gastrointestinal: Negative for blood in stool, diarrhea and nausea. All other systems reviewed and are negative. Objective:   Physical Exam  Vitals signs reviewed. Constitutional:       Appearance: She is obese. Skin:     Capillary Refill: Capillary refill takes less than 2 seconds. Neurological:      General: No focal deficit present. Mental Status: She is alert and oriented to person, place, and time. Mental status is at baseline. Psychiatric:         Mood and Affect: Mood normal.         Behavior: Behavior normal.         Thought Content: Thought content normal.         Judgment: Judgment normal.           Assessment/Plan   1. Type 2 diabetes mellitus without complication, without long-term current use of insulin ([facility])  Pt educated about carbohydrate counting. 2. Class 2 severe obesity due to excess calories with serious comorbidity and body mass index (BMI) of 37.0 to 37.9 in adult [facility])  Pt seems motivated to make changes, she has had success in the past with calorie deficit educated pt about watching carbs and times she eats, set goals for patient. She will return in 8 weeks. More then 50% of time spent counseling pt on nutrition for diabetes and weight loss. Levi Greene, APRN - CNP

## 2020-07-31 NOTE — PATIENT INSTRUCTIONS
Goals: 1. Intermittent fasting 7 pm - 10 am.   2. 45 grams or less of carbs at meals, try to do less carbs at dinner  3. 6 glass of water a day.

## 2020-08-01 LAB
METANEPH/PLASMA INTERP: NORMAL
METANEPHRINE FREE PLASMA: <0.1 NMOL/L (ref 0–0.49)
NORMETANEPHRINE FREE PLASMA: 0.4 NMOL/L (ref 0–0.89)
RENIN ACTIVITY: 2.6 NG/ML/HR

## 2020-09-10 RX ORDER — METOPROLOL SUCCINATE 25 MG/1
TABLET, EXTENDED RELEASE ORAL
Qty: 90 TABLET | Refills: 3 | Status: SHIPPED | OUTPATIENT
Start: 2020-09-10 | End: 2021-12-02

## 2020-09-10 RX ORDER — LEVOTHYROXINE SODIUM 0.05 MG/1
TABLET ORAL
Qty: 90 TABLET | Refills: 0 | Status: SHIPPED | OUTPATIENT
Start: 2020-09-10 | End: 2020-11-16

## 2020-09-15 RX ORDER — LOSARTAN POTASSIUM 100 MG/1
100 TABLET ORAL DAILY
Qty: 90 TABLET | Refills: 0 | Status: SHIPPED | OUTPATIENT
Start: 2020-09-15 | End: 2020-11-16 | Stop reason: SDUPTHER

## 2020-10-05 ENCOUNTER — TELEPHONE (OUTPATIENT)
Dept: PULMONOLOGY | Age: 65
End: 2020-10-05

## 2020-10-05 ENCOUNTER — HOSPITAL ENCOUNTER (OUTPATIENT)
Age: 65
Discharge: HOME | End: 2020-10-05
Payer: COMMERCIAL

## 2020-10-05 LAB
ALBUMIN SERPL-MCNC: 4.4 G/DL (ref 3.4–5)
ANION GAP SERPL CALCULATED.3IONS-SCNC: 11 MMOL/L (ref 3–16)
BUN BLDV-MCNC: 16 MG/DL (ref 7–20)
CALCIUM SERPL-MCNC: 9.6 MG/DL (ref 8.3–10.6)
CHLORIDE BLD-SCNC: 102 MMOL/L (ref 99–110)
CO2: 26 MMOL/L (ref 21–32)
CREAT SERPL-MCNC: 1 MG/DL (ref 0.6–1.2)
GFR AFRICAN AMERICAN: >60
GFR NON-AFRICAN AMERICAN: 56
GLUCOSE BLD-MCNC: 100 MG/DL (ref 70–99)
PHOSPHORUS: 3.3 MG/DL (ref 2.5–4.9)
POTASSIUM SERPL-SCNC: 4 MMOL/L (ref 3.5–5.1)
SODIUM BLD-SCNC: 139 MMOL/L (ref 136–145)

## 2020-10-05 PROCEDURE — 80069 RENAL FUNCTION PANEL: CPT

## 2020-10-05 PROCEDURE — 36415 COLL VENOUS BLD VENIPUNCTURE: CPT

## 2020-10-05 NOTE — TELEPHONE ENCOUNTER
Patient cancelled appointment on 10/13/20 with Claire Medellin for 1 year sleep. Reason: Provider    Patient did reschedule appointment. Appointment rescheduled for 11/5/20. Last OV 10/15/19  Assessment:       · Mild VANESSA. Auto CPAP 8-12 cm H2O. Optimal compliance and efficacy on review today. · Snoring-resolved on CPAP  · Fatigue-improving  · Obesity  · HTN         Plan:      · Changed in office to auto CPAP 9-13 cm H2O  · Advised to use CPAP 6-8 hrs at night and during naps. · Replacement of mask, tubing, head straps every 3-6 months or sooner if damaged. · Patient instructed to contact [employer] company for any mask, tubing or machine trouble shooting if problems arise. · Sleep hygiene  · Avoid sedatives, alcohol and caffeinated drinks at bed time. · No driving motorized vehicles or operating heavy machinery while fatigue, drowsy or sleepy. · Weight loss is also recommended as a long-term intervention. · Complications of VANESSA if not treated were discussed with patient patient to include systemic hypertension, pulmonary hypertension, cardiovascular morbidities, car accidents and all cause mortality. · Patient education handout provided regarding sleep tips and CPAP cleaning recommendations  · Continue to follow with PCP and cardiology for elevated blood pressure     Follow-up: 1 year, sooner if needed     More than 25 minutes of time was spent in direct patient contact during this visit, more than 50% of which was spent counseling and/or coordinating care.

## 2020-10-05 NOTE — TELEPHONE ENCOUNTER
Within this Telehealth Consent, the terms you and yours refer to the person using the Telehealth Service (Service), or in the case of a use of the Service by or on behalf of a minor, you and yours refer to and include (i) the parent or legal guardian who provides consent to the use of the Service by such minor or uses the Service on behalf of such minor, and (ii) the minor for whom consent is being provided or on whose behalf the Service is being utilized. When using Service, you will be consulting with your health care providers via the use of Telehealth.   Telehealth involves the delivery of healthcare services using electronic communications, information technology or other means between a healthcare provider and a patient who are not in the same physical location. Telehealth may be used for diagnosis, treatment, follow-up and/or patient education, and may include, but is not limited to, one or more of the following:    Electronic transmission of medical records, photo images, personal health information or other data between a patient and a healthcare provider    Interactions between a patient and healthcare provider via audio, video and/or data communications    Use of output data from medical devices, sound and video files    Anticipated Benefits   The use of Telehealth by your Provider(s) through the Service may have the following possible benefits:    Making it easier and more efficient for you to access medical care and treatment for the conditions treated by such Provider(s) utilizing the Service    Allowing you to obtain medical care and treatment by Provider(s) at times that are convenient for you    Enabling you to interact with Provider(s) without the necessity of an in-office appointment     Possible Risks   While the use of Telehealth can provide potential benefits for you, there are also potential risks associated with the use of Telehealth.  These risks include, but may not be the provision of medical care and treatment via Telehealth and the Service and you may not be able to receive diagnosis and/or treatment through the Service for every condition for which you seek diagnosis and/or treatment. 11. There are potential risks to the use of Telehealth, including but not limited to the risks described in this Telehealth Consent. 12. Your Provider(s) have discussed the use of Telehealth and the Service with you, including the benefits and risks of such and you have provided oral consent to your Provider(s) for the use of Telehealth and the Service. 15. You understand that it is your duty to provide your Provider(s) truthful, accurate and complete information, including all relevant information regarding care that you may have received or may be receiving from other healthcare providers outside of the Service. 14. You understand that each of your Provider(s) may determine in his or sole discretion that your condition is not suitable for diagnosis and/or treatment using the Service, and that you may need to seek medical care and treatment a specialist or other healthcare provider, outside of the Service. 15. You understand that you are fully responsible for payment for all services provided by Provider(s) or through use of the Service and that you may not be able to use third-party insurance. 16. You represent that (a) you have read this Telehealth Consent carefully, (b) you understand the risks and benefits of the Service and the use of Telehealth in the medical care and treatment provided to you by Provider(s) using the Service, and (c) you have the legal capacity and authority to provide this consent for yourself and/or the minor for which you are consenting under applicable federal and state laws, including laws relating to the age of [de-identified] and/or parental/guardian consent.    17. You give your informed consent to the use of Telehealth by Provider(s) using the Service under the terms described in the Terms of Service and this Telehealth Consent. The patient was read the following statement and has consented to the visit as of 10/5/20. The patient has been scheduled for their first telehealth visit on 10/13/20 with Abdoulaye Nunez CNP.

## 2020-11-05 ENCOUNTER — VIRTUAL VISIT (OUTPATIENT)
Dept: PULMONOLOGY | Age: 65
End: 2020-11-05
Payer: COMMERCIAL

## 2020-11-05 PROBLEM — G47.33 MILD OBSTRUCTIVE SLEEP APNEA: Status: ACTIVE | Noted: 2020-11-05

## 2020-11-05 PROCEDURE — 99442 PR PHYS/QHP TELEPHONE EVALUATION 11-20 MIN: CPT | Performed by: NURSE PRACTITIONER

## 2020-11-05 ASSESSMENT — SLEEP AND FATIGUE QUESTIONNAIRES
HOW LIKELY ARE YOU TO NOD OFF OR FALL ASLEEP WHILE SITTING AND TALKING TO SOMEONE: 0
HOW LIKELY ARE YOU TO NOD OFF OR FALL ASLEEP WHILE WATCHING TV: 0
ESS TOTAL SCORE: 0
HOW LIKELY ARE YOU TO NOD OFF OR FALL ASLEEP WHILE SITTING AND READING: 0
HOW LIKELY ARE YOU TO NOD OFF OR FALL ASLEEP WHILE SITTING QUIETLY AFTER LUNCH WITHOUT ALCOHOL: 0
HOW LIKELY ARE YOU TO NOD OFF OR FALL ASLEEP WHILE SITTING INACTIVE IN A PUBLIC PLACE: 0
HOW LIKELY ARE YOU TO NOD OFF OR FALL ASLEEP WHEN YOU ARE A PASSENGER IN A CAR FOR AN HOUR WITHOUT A BREAK: 0
HOW LIKELY ARE YOU TO NOD OFF OR FALL ASLEEP WHILE LYING DOWN TO REST IN THE AFTERNOON WHEN CIRCUMSTANCES PERMIT: 0
HOW LIKELY ARE YOU TO NOD OFF OR FALL ASLEEP IN A CAR, WHILE STOPPED FOR A FEW MINUTES IN TRAFFIC: 0

## 2020-11-05 NOTE — PROGRESS NOTES
history of sleep apnea. Bedtime 10 pm and rise time is 7 am. It takes few minutes to fall asleep- plays on phone until gets sleepy then right to sleep. No TV in bedroom. 1 nocturia. Wakes up 1 times at night. It takes few minutes to fall back a sleep. Takes No nap during the day. Occasional headache in am. No car wrecks or near wrecks because of the sleepiness. No nodding off while driving. Gained 70 pounds in the past 9 years. No forgetfulness or decreased concentration. No nasal congestion at night. Drinks 2 caffinated beverages per day. No significant alcohol. No restless feelings in legs at night. No teeth grinding, maybe some clentching. No nightmares. No sleep walking. No night time panic attacks. No narcotics. No drug abuse. +history of depression. +history of anxiety. No history of atrial fibrillation. No history of DM. +history of HTN. No history of ischemic heart disease. No history of stroke. ESS is 1. No smoking. No FH for RLS. +FH for VANESSA- dad. States son has narcolepsy. Blood pressure is elevated in office today. Denies CP, vision change or Headache.    States she is working with PCP and cardiology    Sleep Medicine 11/5/2020 10/15/2019 6/4/2019 5/22/2019   Sitting and reading 0 0 1 0   Watching TV 0 0 0 0   Sitting, inactive in a public place (e.g. a theatre or a meeting) 0 0 0 0   As a passenger in a car for an hour without a break 0 0 0 0   Lying down to rest in the afternoon when circumstances permit 0 3 3 1   Sitting and talking to someone 0 0 0 0   Sitting quietly after a lunch without alcohol 0 0 0 0   In a car, while stopped for a few minutes in traffic 0 0 0 0   Total score 0 3 4 1   Neck circumference - 15.75 15.75 15.75       Past Medical History:  Past Medical History:   Diagnosis Date    Acquired hypothyroidism 1/6/2016    Adjustment reaction with anxiety and depression 10/19/2015    Chronic diastolic congestive heart failure ([address] 9/30/2019    Depression     Essential Take 1 tablet by mouth daily, Disp: 90 tablet, Rfl: 0    metoprolol succinate (TOPROL XL) 25 MG extended release tablet, TAKE 1 TABLET BY MOUTH EVERY DAY, Disp: 90 tablet, Rfl: 3    levothyroxine (SYNTHROID) 50 MCG tablet, TAKE 1 TABLET BY MOUTH EVERY DAY, Disp: 90 tablet, Rfl: 0    mirabegron (MYRBETRIQ) 25 MG TB24, , Disp: , Rfl:     Calcium-Magnesium-Zinc 333-133-5 MG TABS, Take by mouth, Disp: , Rfl:     olopatadine (PATADAY) 0.2 % SOLN ophthalmic solution, 1 drop daily, Disp: , Rfl:     furosemide (LASIX) 40 MG tablet, TAKE 1 TABLET BY MOUTH EVERY DAY, Disp: 90 tablet, Rfl: 3    Efinaconazole (JUBLIA) 10 % SOLN, Apply once daily to affected toenail, Disp: 8 mL, Rfl: 5    clobetasol (TEMOVATE) 0.05 % cream, Apply to affected area twice daily for up to 2 weeks or until improved. , Disp: 60 g, Rfl: 2    doxazosin (CARDURA) 4 MG tablet, Take 1 tablet by mouth nightly, Disp: 90 tablet, Rfl: 3    Omega-3 Fatty Acids (FISH OIL) 1000 MG CAPS, Take 1 capsule by mouth daily, Disp: 30 capsule, Rfl: 0    omeprazole (PRILOSEC OTC) 20 MG tablet, Take 1 tablet by mouth daily, Disp: 90 tablet, Rfl: 1    Cholecalciferol (VITAMIN D3 PO), Take 500 mg by mouth daily, Disp: , Rfl:     b complex vitamins capsule, Take 1 capsule by mouth daily, Disp: , Rfl:     Biotin 3 MG TABS, Take 3 mg by mouth daily, Disp: , Rfl:         Review of Systems        Objective:   PHYSICAL EXAM:  There were no vitals taken for this visit. Physical Exam        DATA:   6/3/19 HST No significant sleep related breathing disorder, low SPO2 83%, under 89% for 39.4 minutes.   Recommendations in lab PSG as HST could be falsely negative given degree of hypoxia  6/21/2019 PSG AHI 5.5/REM AHI 13.8, low SPO2 83%  8/5/2019 titration-controlled sleep-related breathing with CPAP, recommendation CPAP 8-12 cm H2O    CPAP compliance data:  Compliance download report from 9/14/19 to 10/13/19 reviewed today by me and showed patient is using machine 5:32 hrs/night with 73.3% compliance and AHI 4.2 within this time frame. 22/30days with greater than 4 hours of machine use. 90% pressure 9.5 cm H20 on auto CPAP 8-12 cm H2O    Compliance download report from 9/25/20 to 10/25/20 reviewed today by me and showed patient is using machine 7:27 hrs/night with 77.4% compliance and AHI 3.1 within this time frame. 24/30days with greater than 4 hours of machine use. 90% pressure 10.3 cm H20 on auto CPAP 9-13 cm H2O    Assessment:       · Mild VANESSA. Auto CPAP 9-13 cm H2O. Optimal compliance and efficacy on review today. · Snoring-resolved on CPAP  · Fatigue-improving  · Obesity  · HTN        Plan:      · Continue auto CPAP 9-13 cm H2O  · Advised to use CPAP 6-8 hrs at night and during naps. · Replacement of mask, tubing, head straps every 3-6 months or sooner if damaged. · Patient instructed to contact DME company for any mask, tubing or machine trouble shooting if problems arise. · Sleep hygiene  · Avoid sedatives, alcohol and caffeinated drinks at bed time. · No driving motorized vehicles or operating heavy machinery while fatigue, drowsy or sleepy. · Weight loss is also recommended as a long-term intervention. · Complications of VANESSA if not treated were discussed with patient patient to include systemic hypertension, pulmonary hypertension, cardiovascular morbidities, car accidents and all cause mortality. Patient education handout provided regarding sleep tips and CPAP cleaning recommendations      Follow-up: 1 year, sooner if needed    Consent for telehealth visit was obtained and is noted in chart    Robbi Bloom is a 72 y.o. female evaluated via telephone on 11/5/2020. I affirm this is a Patient Initiated Episode with a Patient who has not had a related appointment within my department in the past 7 days or scheduled within the next 24 hours.     Patient identification was verified at the start of the visit: Yes    Total Time: minutes: 11-20

## 2020-11-05 NOTE — PATIENT INSTRUCTIONS

## 2020-11-16 ENCOUNTER — OFFICE VISIT (OUTPATIENT)
Dept: CARDIOLOGY CLINIC | Age: 65
End: 2020-11-16
Payer: COMMERCIAL

## 2020-11-16 VITALS
DIASTOLIC BLOOD PRESSURE: 65 MMHG | WEIGHT: 232 LBS | SYSTOLIC BLOOD PRESSURE: 118 MMHG | BODY MASS INDEX: 36.41 KG/M2 | OXYGEN SATURATION: 97 % | HEART RATE: 53 BPM | TEMPERATURE: 97 F | HEIGHT: 67 IN

## 2020-11-16 PROCEDURE — 99214 OFFICE O/P EST MOD 30 MIN: CPT | Performed by: INTERNAL MEDICINE

## 2020-11-16 RX ORDER — LOSARTAN POTASSIUM 100 MG/1
100 TABLET ORAL DAILY
Qty: 90 TABLET | Refills: 3 | Status: SHIPPED | OUTPATIENT
Start: 2020-11-16 | End: 2021-12-02

## 2020-11-16 RX ORDER — DOXAZOSIN MESYLATE 4 MG/1
4 TABLET ORAL NIGHTLY
Qty: 90 TABLET | Refills: 3 | Status: SHIPPED | OUTPATIENT
Start: 2020-11-16 | End: 2022-08-01 | Stop reason: SDUPTHER

## 2020-11-16 NOTE — PROGRESS NOTES
[facility]   Cardiac Followup    Referring Provider:  No primary care provider on file. Chief Complaint   Patient presents with    1 Year Follow Up     Cardiac follow up for HTN    Other     Discuss blood pressure and medications      Subjective: Ms Marcella Echeverria is here for cardiology follow up HTN; no complaints today    History of Present Illness:   Ms. Marcella Echeverria is a 72 y.o. female here for routine cardiac f/u. Last OV 10/1/19 with me. I used to see her  who passed away in 2016. She has PMH HTN, chronic diastolic CHF, Hypothyroidism, GERD, mild VANESSA on CPAP. She saw my partner Dr. Stef Juan as new pt 5/20/19 for HTN, TATUM, and abnormal EKG. Note EKG 5/20/19  marked sinus bradycardia 47 bpm; LAE; Nonspecific ST & T wave abnormality. Stress ECHO 6/4/19 Significant hypertensive response to exercise. Significant LVH on baseline echo. Near cavity obliteration at peak stress but otherwise normal stress ECHO without ischemia. EF 60%. Sleep study 6/27/19 revealed Mild VANESSA. At [address] 10/1/19 she c/o feeling lazy and lethargic at times. I reduced Toprol XL from 50 to 25mg daily and this improved. Today she reports feeling better since decreasing metoprolol last office visit. . Denies chest pain, shortness of breath, edema, dizziness, palpitations and syncope. Her weight is down 8# since 10/2019 (232# today). She has occasional pain in right neck that is hard to describe. She reports following with Nephrology Dr. Tessie Hall for mild kidney dysfunction. Her HCTZ was stopped. and told to avoid NSAIDs. Past Medical History:   has a past medical history of Acquired hypothyroidism, Adjustment reaction with anxiety and depression, Chronic diastolic congestive heart failure ([address] Depression, Essential hypertension, Gastroesophageal reflux disease without esophagitis, Hiatal hernia, Hypertension, Obesity, Class II, BMI 35-39.9, with comorbidity, and Sleep apnea.     Surgical History:   has a past surgical history that includes partial hysterectomy (cervix not removed) (2000); Colonoscopy (2005); Colonoscopy; Upper gastrointestinal endoscopy (N/A, 1/9/2020); and egd colonoscopy (1/9/2020). Social History:   reports that she has never smoked. She has never used smokeless tobacco. She reports current alcohol use. She reports that she does not use drugs. Family History:  family history includes Alzheimer's Disease in her mother; Arthritis in her father; Cirrhosis in her paternal grandmother; Diabetes in her paternal aunt and paternal grandfather; Early Death (age of onset: 47) in her maternal grandfather; Hearing Loss in her father; Heart Disease in her father, maternal grandmother, and mother; Kidney Disease in her brother; [place]elleen Job / [place] in her sister; Other in her father, paternal grandmother, paternal uncle, and sister; Stroke in her maternal grandmother. Home Medications:  Prior to Admission medications    Medication Sig Start Date End Date Taking? Authorizing Provider   losartan (COZAAR) 100 MG tablet Take 1 tablet by mouth daily 9/15/20  Yes Barb Tabor MD   metoprolol succinate (TOPROL XL) 25 MG extended release tablet TAKE 1 TABLET BY MOUTH EVERY DAY 9/10/20  Yes Barb Tabor MD   mirabegron (MYRBETRIQ) 25 MG TB24  5/11/20  Yes Historical Provider, MD   Calcium-Magnesium-Zinc 419-206-7 MG TABS Take by mouth   Yes Historical Provider, MD   olopatadine (PATADAY) 0.2 % SOLN ophthalmic solution 1 drop daily   Yes Historical Provider, MD   furosemide (LASIX) 40 MG tablet TAKE 1 TABLET BY MOUTH EVERY DAY 6/12/20  Yes Barb Tabor MD   clobetasol (TEMOVATE) 0.05 % cream Apply to affected area twice daily for up to 2 weeks or until improved.  10/14/19  Yes Clarita Cool MD   doxazosin (CARDURA) 4 MG tablet Take 1 tablet by mouth nightly 10/1/19  Yes Barb Tabor MD   Omega-3 Fatty Acids (FISH OIL) 1000 MG CAPS Take 1 capsule by mouth daily 6/7/19  Yes Ingrid Aguirre APRN - CNP   omeprazole (PRILOSEC OTC) 20 MG tablet Take 1 tablet by mouth daily 4/18/19  Yes Leticia Louise MD   Biotin 3 MG TABS Take 3 mg by mouth daily   Yes Historical Provider, MD   Cholecalciferol (VITAMIN D3 PO) Take 500 mg by mouth daily   Yes Historical Provider, MD   b complex vitamins capsule Take 1 capsule by mouth daily    Historical Provider, MD   Efinaconazole (JUBLIA) 10 % SOLN Apply once daily to affected toenail  Patient not taking: Reported on 11/16/2020 10/23/19   Leticia Soliman MD      Allergies:  Sulfa antibiotics     Review of Systems:   · Constitutional: there has been no unanticipated weight loss. There's been no change in energy level, sleep pattern, or activity level. · Eyes: No visual changes or diplopia. No scleral icterus. · ENT: No Headaches, hearing loss or vertigo. No mouth sores or sore throat. · Cardiovascular: Reviewed in HPI  · Respiratory: No cough or wheezing, no sputum production. No hematemesis. · Gastrointestinal: No abdominal pain, appetite loss, blood in stools. No change in bowel or bladder habits. · Genitourinary: No dysuria, trouble voiding, or hematuria. · Musculoskeletal:  No gait disturbance, weakness or joint complaints. · Integumentary: No rash or pruritis. · Neurological: No headache, diplopia, change in muscle strength, numbness or tingling. No change in gait, balance, coordination, mood, affect, memory, mentation, behavior. · Psychiatric: No anxiety, no depression. · Endocrine: No malaise, fatigue or temperature intolerance. No excessive thirst, fluid intake, or urination. No tremor. · Hematologic/Lymphatic: No abnormal bruising or bleeding, blood clots or swollen lymph nodes. · Allergic/Immunologic: No nasal congestion or hives.     Physical Examination:    Wt Readings from Last 3 Encounters:   11/16/20 232 lb (105.2 kg)   10/12/20 233 lb (105.7 kg)   08/10/20 239 lb (108.4 kg)     Temp Readings from Last 3 Encounters:   11/16/20 97 °F (36.1 °C)   10/12/20 97.8 °F (36.6 °C)   08/10/20 98.5 °F (36.9 °C)     BP Readings from Last 3 Encounters:   11/16/20 118/65   10/12/20 129/73   08/10/20 137/82     Pulse Readings from Last 3 Encounters:   11/16/20 53   10/12/20 59   08/10/20 (!) 47          Constitutional and General Appearance: NAD   Respiratory:  · Normal excursion and expansion without use of accessory muscles  · Resp Auscultation: Normal breath sounds without dullness  Cardiovascular:  · The apical impulses not displaced  · Heart tones are crisp and normal  · Cervical veins are not engorged  · The carotid upstroke is normal in amplitude and contour without delay or bruit  · Normal S1S2, No S3, No Murmur  · Peripheral pulses are symmetrical and full  · There is no clubbing, cyanosis of the extremities.   · No edema  · Femoral Arteries: 2+ and equal  · Pedal Pulses: 2+ and equal   Abdomen:  · No masses or tenderness  · Liver/Spleen: No Abnormalities Noted  Neurological/Psychiatric:  · Alert and oriented in all spheres  · Moves all extremities well  · Exhibits normal gait balance and coordination  · No abnormalities of mood, affect, memory, mentation, or behavior are noted        Skin: warm and dry      Chemistry        Component Value Date/Time     10/05/2020 1602    K 4.0 10/05/2020 1602     10/05/2020 1602    CO2 26 10/05/2020 1602    BUN 16 10/05/2020 1602    CREATININE 1.0 10/05/2020 1602        Component Value Date/Time    CALCIUM 9.6 10/05/2020 1602    ALKPHOS 87 07/10/2020 1428    AST 18 07/10/2020 1428    ALT 22 07/10/2020 1428    BILITOT 0.5 07/10/2020 1428        Lab Results   Component Value Date    CHOL 160 12/13/2019    CHOL 156 10/18/2018    CHOL 183 10/12/2017     Lab Results   Component Value Date    TRIG 95 12/13/2019    TRIG 74 10/18/2018    TRIG 112 10/12/2017     Lab Results   Component Value Date    HDL 46 12/13/2019    HDL 40 10/18/2018    HDL 40 10/12/2017     Lab Results   Component Value Date    LDLCALC 95 12/13/2019    LDLCALC 101 (H) 10/18/2018    LDLCALC 121 (H) 10/12/2017     Lab Results   Component Value Date    LABVLDL 19 12/13/2019    LABVLDL 15 10/18/2018    LABVLDL 22 10/12/2017     No results found for: CHOLHDLRATIO      Labs 10/18/18 TSH 2.59    Assessment:     1. Essential hypertension: Well controlled in office today and will continue current medical regimen. She reports not taking home BP for a long time but took this AM and elevated 461RSVT systolic. We checked BP twice today in office and excellent results both times. I told her to have home BP machine checked for accuracy. May not be working properly. 2.      Diastolic CHF: Likely hypertensive heart disease. She will need to continue good BP control and diuretics for LE edema and SOB. Improved clinically compensated NYHA Class I-II and will continue current CHF medical regimen. .    3. LE edema:  Resolved. Likely due to diastolic CHF and prior norvasc. Improved due to taking lasix regularly and off norvasc. 4.       Bradycardia: Note HR in 50's bpm range but does not have any symptoms and energy improved with lower Toprol 25mg dose. Plan:  1. Meds reviewed. Refills as warranted    2. Have BP machine checked for accuracy. Goal /85 or less. 3. Ok to take extra lasix pill as needed for a few days for weight gain 2-3 lbs or increased swelling. 4. Follow up with me as needed. This note was scribed in the presence of Rosa Amaya MD by Mendy Basurto, RN    I, Dr. Jacky Wong, personally performed the services described in this documentation, as scribed by the above signed scribe in my presence. It is both accurate and complete to my knowledge. I agree with the details independently gathered by the clinical support staff, while the remaining scribed note accurately describes my personal service to the patient. Cost of prescription medications and patient compliance have been reviewed with patient.  All questions answered. Thank you for allowing me to participate in the care of this individual.    Kristy Marinelli.  Amber Snow M.D., [facility] [place]

## 2020-11-16 NOTE — PATIENT INSTRUCTIONS
Plan:  1. Meds reviewed. Refills as warranted    2. Have BP machine checked for accuracy. Goal /85 or less. 3. Ok to take extra lasix pill as needed for a few days for weight gain 2-3 lbs or increased swelling.    4. Follow up with me as needed

## 2020-12-21 ENCOUNTER — TELEPHONE (OUTPATIENT)
Dept: FAMILY MEDICINE CLINIC | Age: 65
End: 2020-12-21

## 2020-12-21 NOTE — TELEPHONE ENCOUNTER
Pt states that she does not need the refill at this time. She is not sure who she wants to est with since dr Santos Carbajal is no longer in our practice. She get medicare starting at the first of the year. Once that starts, pt will call back to set appt up.

## 2021-02-24 ENCOUNTER — HOSPITAL ENCOUNTER (OUTPATIENT)
Age: 66
Discharge: HOME | End: 2021-02-24
Payer: MEDICARE

## 2021-02-24 DIAGNOSIS — N18.31 STAGE 3A CHRONIC KIDNEY DISEASE (HCC): ICD-10-CM

## 2021-02-24 PROCEDURE — 36415 COLL VENOUS BLD VENIPUNCTURE: CPT

## 2021-02-24 PROCEDURE — 80048 BASIC METABOLIC PNL TOTAL CA: CPT

## 2021-02-25 LAB
ANION GAP SERPL CALCULATED.3IONS-SCNC: 12 MMOL/L (ref 3–16)
BUN BLDV-MCNC: 21 MG/DL (ref 7–20)
CALCIUM SERPL-MCNC: 9.5 MG/DL (ref 8.3–10.6)
CHLORIDE BLD-SCNC: 100 MMOL/L (ref 99–110)
CO2: 28 MMOL/L (ref 21–32)
CREAT SERPL-MCNC: 1.1 MG/DL (ref 0.6–1.2)
GFR AFRICAN AMERICAN: >60
GFR NON-AFRICAN AMERICAN: 50
GLUCOSE BLD-MCNC: 106 MG/DL (ref 70–99)
POTASSIUM SERPL-SCNC: 3.7 MMOL/L (ref 3.5–5.1)
SODIUM BLD-SCNC: 140 MMOL/L (ref 136–145)

## 2021-03-01 ENCOUNTER — VIRTUAL VISIT (OUTPATIENT)
Dept: PULMONOLOGY | Age: 66
End: 2021-03-01
Payer: MEDICARE

## 2021-03-01 ENCOUNTER — TELEPHONE (OUTPATIENT)
Dept: PULMONOLOGY | Age: 66
End: 2021-03-01

## 2021-03-01 DIAGNOSIS — G47.33 MILD OBSTRUCTIVE SLEEP APNEA: Primary | ICD-10-CM

## 2021-03-01 DIAGNOSIS — Z71.89 CPAP USE COUNSELING: ICD-10-CM

## 2021-03-01 DIAGNOSIS — E66.9 OBESITY (BMI 30-39.9): ICD-10-CM

## 2021-03-01 PROCEDURE — 1090F PRES/ABSN URINE INCON ASSESS: CPT | Performed by: NURSE PRACTITIONER

## 2021-03-01 PROCEDURE — 1123F ACP DISCUSS/DSCN MKR DOCD: CPT | Performed by: NURSE PRACTITIONER

## 2021-03-01 PROCEDURE — G8399 PT W/DXA RESULTS DOCUMENT: HCPCS | Performed by: NURSE PRACTITIONER

## 2021-03-01 PROCEDURE — G8427 DOCREV CUR MEDS BY ELIG CLIN: HCPCS | Performed by: NURSE PRACTITIONER

## 2021-03-01 PROCEDURE — 3017F COLORECTAL CA SCREEN DOC REV: CPT | Performed by: NURSE PRACTITIONER

## 2021-03-01 PROCEDURE — 4040F PNEUMOC VAC/ADMIN/RCVD: CPT | Performed by: NURSE PRACTITIONER

## 2021-03-01 PROCEDURE — 99213 OFFICE O/P EST LOW 20 MIN: CPT | Performed by: NURSE PRACTITIONER

## 2021-03-01 ASSESSMENT — SLEEP AND FATIGUE QUESTIONNAIRES
HOW LIKELY ARE YOU TO NOD OFF OR FALL ASLEEP WHEN YOU ARE A PASSENGER IN A CAR FOR AN HOUR WITHOUT A BREAK: 0
HOW LIKELY ARE YOU TO NOD OFF OR FALL ASLEEP WHILE SITTING INACTIVE IN A PUBLIC PLACE: 0
HOW LIKELY ARE YOU TO NOD OFF OR FALL ASLEEP IN A CAR, WHILE STOPPED FOR A FEW MINUTES IN TRAFFIC: 0
ESS TOTAL SCORE: 3
HOW LIKELY ARE YOU TO NOD OFF OR FALL ASLEEP WHILE SITTING QUIETLY AFTER LUNCH WITHOUT ALCOHOL: 1
HOW LIKELY ARE YOU TO NOD OFF OR FALL ASLEEP WHILE SITTING AND TALKING TO SOMEONE: 0

## 2021-03-01 NOTE — PATIENT INSTRUCTIONS

## 2021-03-01 NOTE — PROGRESS NOTES
Patient ID: Anthony Arteaga is a 72 y.o. female who is being seen today for   Chief Complaint   Patient presents with    Sleep Apnea     sleep follow up     Referring: Dr. Leonie Gilbert    HPI:     Anthony Arteaga is a 72 y.o. female for televideo appointment via video and audio doxy. me virtual visit for VANESSA follow up. States she is doing well with CPAP, states it helps her, can tell a difference in how she feels when she does not use it. Patient is using CPAP   6-7 hrs/night. Using humidifier. No snoring on CPAP. The pressure is well tolerated. The mask is comfortable- dreamwear nasal nask. No mask leak. No significant daytime sleepiness. No nodding off when driving. No dry nose or throat. No fatigue. Bedtime is MN and rise time is  8-930 am. Sleep onset is usually few minutes. Wakes up 0 times at night total. 0-1  nocturia. It takes few minutes to fall back a sleep. No naps during the day. No headache in am. No weight gain. No caffienated beverages during the day. No alcohol. ESS is 3. Previous HPI 6/4/19  Anthony Arteaga is a 72 y.o. female in office for VANESSA follow up. HST was reviewed by me and noted below. Results were dicussed with patient and multiple good questions were answered. It showed no significant sleep related breathing disorder however low SPO2 83% in under 89% for 39.4 minutes     Bedtime is MN and rise time is 7 am Sleep onset is few minutes. Wakes up 0-3 times at night total. 0-3 nocturia. It takes few minutes to fall back a sleep. No naps during the day. Occasional headache in am. No weight gain. 2 caffienated beverages during the day. No alcohol. ESS is 4      Initial HPI 5/22/19  Anthony Arteaga is a 72 y.o. female in office for sleep apnea evaluation. Patient reports snoring at night for the past 10 years. Worse in supine position. Occasionally wakes self snoring. Unsure if witnessed apnea, sleeps alone. Wakes up at night choking and gasping for air. No restorative sleep.  No dry Medications:     losartan (COZAAR) 100 MG tablet, Take 1 tablet by mouth daily, Disp: 90 tablet, Rfl: 3    doxazosin (CARDURA) 4 MG tablet, Take 1 tablet by mouth nightly, Disp: 90 tablet, Rfl: 3    metoprolol succinate (TOPROL XL) 25 MG extended release tablet, TAKE 1 TABLET BY MOUTH EVERY DAY, Disp: 90 tablet, Rfl: 3    mirabegron (MYRBETRIQ) 25 MG TB24, , Disp: , Rfl:     Calcium-Magnesium-Zinc 333-133-5 MG TABS, Take by mouth, Disp: , Rfl:     olopatadine (PATADAY) 0.2 % SOLN ophthalmic solution, 1 drop daily, Disp: , Rfl:     furosemide (LASIX) 40 MG tablet, TAKE 1 TABLET BY MOUTH EVERY DAY, Disp: 90 tablet, Rfl: 3    clobetasol (TEMOVATE) 0.05 % cream, Apply to affected area twice daily for up to 2 weeks or until improved. , Disp: 60 g, Rfl: 2    Omega-3 Fatty Acids (FISH OIL) 1000 MG CAPS, Take 1 capsule by mouth daily, Disp: 30 capsule, Rfl: 0    omeprazole (PRILOSEC OTC) 20 MG tablet, Take 1 tablet by mouth daily, Disp: 90 tablet, Rfl: 1    Cholecalciferol (VITAMIN D3 PO), Take 500 mg by mouth daily, Disp: , Rfl:     b complex vitamins capsule, Take 1 capsule by mouth daily, Disp: , Rfl:     Efinaconazole (JUBLIA) 10 % SOLN, Apply once daily to affected toenail (Patient not taking: Reported on 3/1/2021), Disp: 8 mL, Rfl: 5    Biotin 3 MG TABS, Take 3 mg by mouth daily, Disp: , Rfl:         Review of Systems        Objective:   PHYSICAL EXAM:  There were no vitals taken for this visit. Physical Exam  Exam:  Gen: No acute distress, does not appear to be in pain. Appears well developed and nourished. HENT: Head is normocephalic and atraumatic. Normal appearing nose. External Ears normal.   Neck: No visualized mass. Trachea is midline   Eyes: EOM intact. No visible discharge. Resp:No visualized signs of difficulty breathing or respiratory distress, speaking in full sentences. Respiratory effort normal.  Neuro: Awake. Alert. Able to follow commands. No facial asymmetry.   Psych: Oriented x 3. No anxiety. Normal affect. DATA:   6/3/19 HST No significant sleep related breathing disorder, low SPO2 83%, under 89% for 39.4 minutes. Recommendations in lab PSG as HST could be falsely negative given degree of hypoxia  6/21/2019 PSG AHI 5.5/REM AHI 13.8, low SPO2 83%  8/5/2019 titrationcontrolled sleep-related breathing with CPAP, recommendation CPAP 8-12 cm H2O    CPAP compliance data:  Compliance download report from 9/14/19 to 10/13/19 reviewed today by me and showed patient is using machine 5:32 hrs/night with 73.3% compliance and AHI 4.2 within this time frame. 22/30days with greater than 4 hours of machine use. 90% pressure 9.5 cm H20 on auto CPAP 8-12 cm H2O    Compliance download report from 9/25/20 to 10/25/20 reviewed today by me and showed patient is using machine 7:27 hrs/night with 77.4% compliance and AHI 3.1 within this time frame. 24/30days with greater than 4 hours of machine use. 90% pressure 10.3 cm H20 on auto CPAP 9-13 cm H2O    Compliance download report from 1/25/21 to 2/23/21 reviewed today by me and showed patient is using machine 6:19 hrs/night with 80% compliance and AHI 2.3 within this time frame. 26/30days with greater than 4 hours of machine use. 90% pressure 10.5 cm H20 on 9-13 cm H2O      Assessment:       · Mild VANESSA. Auto CPAP 9-13 cm H2O. Optimal compliance and efficacy on review today. · Snoring-resolved on CPAP  · Fatigue-improving  · Obesity  · HTN        Plan:      · Continue auto CPAP 9-13 cm H2O  · Advised to use CPAP 6-8 hrs at night and during naps. · Replacement of mask, tubing, head straps every 3-6 months or sooner if damaged. · Patient instructed to contact [employer] company for any mask, tubing or machine trouble shooting if problems arise. · Sleep hygiene  · Avoid sedatives, alcohol and caffeinated drinks at bed time. · No driving motorized vehicles or operating heavy machinery while fatigue, drowsy or sleepy.    · Weight loss is also recommended as a long-term intervention. · Complications of VANESSA if not treated were discussed with patient patient to include systemic hypertension, pulmonary hypertension, cardiovascular morbidities, car accidents and all cause mortality. Patient education  provided regarding sleep tips and CPAP cleaning recommendations      Follow-up: 1 year, sooner if needed    Consent for telehealth visit was obtained and is noted in chart      Consent for telehealth visit was obtained and is noted in chart      THIS VISIT WAS COMPLETED VIRTUALLY USING DOXY. Silverio Gray is a 72 y.o. female being evaluated by a Virtual Visit (video visit) encounter to address concerns as mentioned above. A caregiver was present when appropriate. Due to this being a TeleHealth encounter (During KDMAW-76 public health emergency), evaluation of the following organ systems was limited: Vitals/Constitutional/EENT/Resp/CV/GI//MS/Neuro/Skin/Heme-Lymph-Imm. Pursuant to the emergency declaration under the [address] [place] [address], [address] [place] [address] and the [employer] and [employer] Act, this Virtual Visit was conducted with patient's (and/or legal guardian's) consent, to reduce the patient's risk of exposure to COVID-19 and provide necessary medical care. The patient (and/or legal guardian) has also been advised to contact this office for worsening conditions or problems, and seek emergency medical treatment and/or call 911 if deemed necessary. Patient identification was verified at the start of the visit: Yes    Total time spent for this encounter: Not billed by time    Services were provided through a video synchronous discussion virtually to substitute for in-person clinic visit. Patient and provider were located at their individual homes. --DEE العراقي CNP on 3/1/2021 at 1:32 PM    An electronic signature was used to authenticate this note. Vinayak Trejo

## 2021-05-07 RX ORDER — FUROSEMIDE 40 MG/1
TABLET ORAL
Qty: 90 TABLET | Refills: 3 | Status: SHIPPED | OUTPATIENT
Start: 2021-05-07 | End: 2022-06-23 | Stop reason: SDUPTHER

## 2021-05-15 ENCOUNTER — HOSPITAL ENCOUNTER (OUTPATIENT)
Age: 66
Discharge: HOME | End: 2021-05-15
Payer: MEDICARE

## 2021-05-15 DIAGNOSIS — I10 ESSENTIAL HYPERTENSION: ICD-10-CM

## 2021-05-15 LAB
ANION GAP SERPL CALCULATED.3IONS-SCNC: 12 MMOL/L (ref 3–16)
BUN BLDV-MCNC: 22 MG/DL (ref 7–20)
CALCIUM SERPL-MCNC: 9.2 MG/DL (ref 8.3–10.6)
CHLORIDE BLD-SCNC: 103 MMOL/L (ref 99–110)
CO2: 24 MMOL/L (ref 21–32)
CREAT SERPL-MCNC: 1 MG/DL (ref 0.6–1.2)
GFR AFRICAN AMERICAN: >60
GFR NON-AFRICAN AMERICAN: 56
GLUCOSE BLD-MCNC: 233 MG/DL (ref 70–99)
POTASSIUM SERPL-SCNC: 3.9 MMOL/L (ref 3.5–5.1)
SODIUM BLD-SCNC: 139 MMOL/L (ref 136–145)

## 2021-05-15 PROCEDURE — 36415 COLL VENOUS BLD VENIPUNCTURE: CPT

## 2021-05-15 PROCEDURE — 80048 BASIC METABOLIC PNL TOTAL CA: CPT

## 2021-09-14 ENCOUNTER — PATIENT MESSAGE (OUTPATIENT)
Dept: PULMONOLOGY | Age: 66
End: 2021-09-14

## 2021-09-15 ENCOUNTER — TELEPHONE (OUTPATIENT)
Dept: PULMONOLOGY | Age: 66
End: 2021-09-15

## 2021-09-15 NOTE — TELEPHONE ENCOUNTER
From: Dwight Lovelace  To: DEE uCeto CNP  Sent: 9/14/2021 8:38 PM EDT  Subject: Non-Urgent Medical Question    I understand that my CPAP machine has been recalled. I talked with the [employer] representative, who confirmed the recall, and said there is no immediate solution provided. She suggested that I discuss with you whether I should continue using the machine. What are you recommending for your patients that are using the [employer]?

## 2021-09-15 NOTE — TELEPHONE ENCOUNTER
I understand that my CPAP machine has been recalled. I talked with the [employer] representative, who confirmed the recall, and said there is no immediate solution provided. She suggested that I discuss with you whether I should continue using the machine.     What are you recommending for your patients that are using the [address]?

## 2021-09-28 ENCOUNTER — TELEPHONE (OUTPATIENT)
Dept: PULMONOLOGY | Age: 66
End: 2021-09-28

## 2021-09-28 ENCOUNTER — VIRTUAL VISIT (OUTPATIENT)
Dept: PULMONOLOGY | Age: 66
End: 2021-09-28
Payer: MEDICARE

## 2021-09-28 DIAGNOSIS — G47.33 MILD OBSTRUCTIVE SLEEP APNEA: Primary | ICD-10-CM

## 2021-09-28 DIAGNOSIS — R53.83 OTHER FATIGUE: ICD-10-CM

## 2021-09-28 DIAGNOSIS — E66.9 OBESITY (BMI 30-39.9): ICD-10-CM

## 2021-09-28 PROCEDURE — 1090F PRES/ABSN URINE INCON ASSESS: CPT | Performed by: NURSE PRACTITIONER

## 2021-09-28 PROCEDURE — 3017F COLORECTAL CA SCREEN DOC REV: CPT | Performed by: NURSE PRACTITIONER

## 2021-09-28 PROCEDURE — 99214 OFFICE O/P EST MOD 30 MIN: CPT | Performed by: NURSE PRACTITIONER

## 2021-09-28 PROCEDURE — G8399 PT W/DXA RESULTS DOCUMENT: HCPCS | Performed by: NURSE PRACTITIONER

## 2021-09-28 PROCEDURE — 4040F PNEUMOC VAC/ADMIN/RCVD: CPT | Performed by: NURSE PRACTITIONER

## 2021-09-28 PROCEDURE — G8427 DOCREV CUR MEDS BY ELIG CLIN: HCPCS | Performed by: NURSE PRACTITIONER

## 2021-09-28 PROCEDURE — 1123F ACP DISCUSS/DSCN MKR DOCD: CPT | Performed by: NURSE PRACTITIONER

## 2021-09-28 ASSESSMENT — SLEEP AND FATIGUE QUESTIONNAIRES
HOW LIKELY ARE YOU TO NOD OFF OR FALL ASLEEP IN A CAR, WHILE STOPPED FOR A FEW MINUTES IN TRAFFIC: 0
HOW LIKELY ARE YOU TO NOD OFF OR FALL ASLEEP WHILE WATCHING TV: 0
HOW LIKELY ARE YOU TO NOD OFF OR FALL ASLEEP WHILE SITTING INACTIVE IN A PUBLIC PLACE: 0
HOW LIKELY ARE YOU TO NOD OFF OR FALL ASLEEP WHEN YOU ARE A PASSENGER IN A CAR FOR AN HOUR WITHOUT A BREAK: 0
HOW LIKELY ARE YOU TO NOD OFF OR FALL ASLEEP WHILE SITTING AND READING: 1
HOW LIKELY ARE YOU TO NOD OFF OR FALL ASLEEP WHILE SITTING QUIETLY AFTER LUNCH WITHOUT ALCOHOL: 0
ESS TOTAL SCORE: 3
HOW LIKELY ARE YOU TO NOD OFF OR FALL ASLEEP WHILE LYING DOWN TO REST IN THE AFTERNOON WHEN CIRCUMSTANCES PERMIT: 2
HOW LIKELY ARE YOU TO NOD OFF OR FALL ASLEEP WHILE SITTING AND TALKING TO SOMEONE: 0

## 2021-09-28 NOTE — PROGRESS NOTES
Patient ID: Jayna Manzo is a 77 y.o. female who is being seen today for   Chief Complaint   Patient presents with    Sleep Apnea     discuss recall     Referring: Dr. Shalini New    HPI:     Jayna Manzo is a 77 y.o. female for televideo appointment via video and audio doxy. me virtual visit for VANESSA follow up. Patient is using CPAP 6-8 hrs/night. She did forget a piece of her mask when she went on vacation recently and was unable to use for about a week. She was more tired when not using CPAP with increased morning headaches and napping. Using humidifier. No snoring on CPAP. The pressure is well tolerated. The mask is somewhat comfortable- dreamwear nasal . States she might want to try brigitte loops. No mask leak. No significant daytime sleepiness. No nodding off when driving. No dry nose or throat. No fatigue. Bedtime is MN and rise time is 9 am. Sleep onset is few minutes. Wakes up 0-1 times at night total. 0-1 nocturia. It takes few minutes to fall back a sleep. few naps during the day. +headache in am since not using CPAP. No weight gain. 0-1 caffienated beverages during the day. Occasional alcohol. ESS is 3        Previous HPI 6/4/19  Jayna Manzo is a 77 y.o. female in office for VANESSA follow up. HST was reviewed by me and noted below. Results were dicussed with patient and multiple good questions were answered. It showed no significant sleep related breathing disorder however low SPO2 83% in under 89% for 39.4 minutes     Bedtime is MN and rise time is 7 am Sleep onset is few minutes. Wakes up 0-3 times at night total. 0-3 nocturia. It takes few minutes to fall back a sleep. No naps during the day. Occasional headache in am. No weight gain. 2 caffienated beverages during the day. No alcohol. ESS is 4      Initial HPI 5/22/19  Jayna Manzo is a 77 y.o. female in office for sleep apnea evaluation. Patient reports snoring at night for the past 10 years. Worse in supine position.  Occasionally wakes self snoring. Unsure if witnessed apnea, sleeps alone. Wakes up at night choking and gasping for air. No restorative sleep. No dry mouth upon awakening. Fatigue and tiredness during the day. No history of sleep apnea. Bedtime 10 pm and rise time is 7 am. It takes few minutes to fall asleep- plays on phone until gets sleepy then right to sleep. No TV in bedroom. 1 nocturia. Wakes up 1 times at night. It takes few minutes to fall back a sleep. Takes No nap during the day. Occasional headache in am. No car wrecks or near wrecks because of the sleepiness. No nodding off while driving. Gained 70 pounds in the past 9 years. No forgetfulness or decreased concentration. No nasal congestion at night. Drinks 2 caffinated beverages per day. No significant alcohol. No restless feelings in legs at night. No teeth grinding, maybe some clentching. No nightmares. No sleep walking. No night time panic attacks. No narcotics. No drug abuse. +history of depression. +history of anxiety. No history of atrial fibrillation. No history of DM. +history of HTN. No history of ischemic heart disease. No history of stroke. ESS is 1. No smoking. No FH for RLS. +FH for VANESSA- dad. States son has narcolepsy. Blood pressure is elevated in office today. Denies CP, vision change or Headache.    States she is working with PCP and cardiology    Sleep Medicine 9/28/2021 3/1/2021 11/5/2020 10/15/2019 6/4/2019 5/22/2019   Sitting and reading 1 0 0 0 1 0   Watching TV 0 0 0 0 0 0   Sitting, inactive in a public place (e.g. a theatre or a meeting) 0 0 0 0 0 0   As a passenger in a car for an hour without a break 0 0 0 0 0 0   Lying down to rest in the afternoon when circumstances permit 2 2 0 3 3 1   Sitting and talking to someone 0 0 0 0 0 0   Sitting quietly after a lunch without alcohol 0 1 0 0 0 0   In a car, while stopped for a few minutes in traffic 0 0 0 0 0 0   Total score 3 3 0 3 4 1   Neck circumference - - - 15.75 15.75 15.75       Past Medical History:  Past Medical History:   Diagnosis Date    Acquired hypothyroidism 1/6/2016    Adjustment reaction with anxiety and depression 10/19/2015    Chronic diastolic congestive heart failure ([address] 9/30/2019    Depression     Essential hypertension 10/19/2015    Gastroesophageal reflux disease without esophagitis 10/19/2015    Hiatal hernia     Hypertension     Kidney failure     stage 3A    Obesity, Class II, BMI 35-39.9, with comorbidity 10/19/2015    Sleep apnea        Past Surgical History:        Procedure Laterality Date    COLONOSCOPY  2005    COLONOSCOPY      EGD COLONOSCOPY  1/9/2020    EGD ESOPHAGOGASTRODUODENOSCOPY DILATATION performed by Shanae Galeana MD at [address]    due to [address] N/A 1/9/2020    EGD BIOPSY performed by Shanae aGleana MD at [facility]:  is allergic to sulfa antibiotics. Social History:    TOBACCO:   reports that she has never smoked. She has never used smokeless tobacco.  ETOH:   reports current alcohol use. Family History:       Problem Relation Age of Onset    Heart Disease Mother         A.  Fib    Alzheimer's Disease Mother     Arthritis Father     Hearing Loss Father     Heart Disease Father     Other Father         Alpha 1 antitrypson deficiency   [de-identified] / [place] Sister     Other Sister         MS and Alpha 1 antitrypson deficiency    Kidney Disease Brother         Kidney stone    Stroke Maternal Grandmother     Heart Disease Maternal Grandmother     Diabetes Paternal Grandfather     Diabetes Paternal Aunt     Other Paternal Uncle         Alpha 1 antitrypson deficiency    Early Death Maternal Grandfather 47    Other Paternal Grandmother         Alpha 1 antitrypson deficiency    Cirrhosis Paternal Grandmother         Non alcoholic    Asthma Neg Hx     Birth Defects Neg Hx     Cancer Neg Hx     Depression Neg Hx  High Blood Pressure Neg Hx     High Cholesterol Neg Hx     Learning Disabilities Neg Hx     Mental Illness Neg Hx     Mental Retardation Neg Hx     Substance Abuse Neg Hx        Current Medications:    Current Outpatient Medications:     furosemide (LASIX) 40 MG tablet, TAKE 1 TABLET BY MOUTH EVERY DAY, Disp: 90 tablet, Rfl: 3    losartan (COZAAR) 100 MG tablet, Take 1 tablet by mouth daily, Disp: 90 tablet, Rfl: 3    doxazosin (CARDURA) 4 MG tablet, Take 1 tablet by mouth nightly, Disp: 90 tablet, Rfl: 3    metoprolol succinate (TOPROL XL) 25 MG extended release tablet, TAKE 1 TABLET BY MOUTH EVERY DAY, Disp: 90 tablet, Rfl: 3    Calcium-Magnesium-Zinc 333-133-5 MG TABS, Take by mouth, Disp: , Rfl:     b complex vitamins capsule, Take 1 capsule by mouth daily, Disp: , Rfl:     olopatadine (PATADAY) 0.2 % SOLN ophthalmic solution, 1 drop daily, Disp: , Rfl:     clobetasol (TEMOVATE) 0.05 % cream, Apply to affected area twice daily for up to 2 weeks or until improved. , Disp: 60 g, Rfl: 2    Omega-3 Fatty Acids (FISH OIL) 1000 MG CAPS, Take 1 capsule by mouth daily, Disp: 30 capsule, Rfl: 0    omeprazole (PRILOSEC OTC) 20 MG tablet, Take 1 tablet by mouth daily, Disp: 90 tablet, Rfl: 1    Biotin 3 MG TABS, Take 3 mg by mouth daily, Disp: , Rfl:     Cholecalciferol (VITAMIN D3 PO), Take 500 mg by mouth daily, Disp: , Rfl:     mirabegron (MYRBETRIQ) 25 MG TB24, , Disp: , Rfl:     Efinaconazole (JUBLIA) 10 % SOLN, Apply once daily to affected toenail (Patient not taking: Reported on 9/28/2021), Disp: 8 mL, Rfl: 5        Review of Systems        Objective:   PHYSICAL EXAM:  There were no vitals taken for this visit. Physical Exam  Exam:  Gen: No acute distress, does not appear to be in pain. Appears well developed and nourished. HENT: Head is normocephalic and atraumatic. Normal appearing nose. External Ears normal.   Neck: No visualized mass. Trachea is midline   Eyes: EOM intact.  No visible discharge. Resp:No visualized signs of difficulty breathing or respiratory distress, speaking in full sentences. Respiratory effort normal.  Neuro: Awake. Alert. Able to follow commands. No facial asymmetry. Psych: Oriented x 3. No anxiety. Normal affect. DATA:   6/3/19 HST No significant sleep related breathing disorder, low SPO2 83%, under 89% for 39.4 minutes. Recommendations in lab PSG as HST could be falsely negative given degree of hypoxia  6/21/2019 PSG AHI 5.5/REM AHI 13.8, low SPO2 83%  8/5/2019 titrationcontrolled sleep-related breathing with CPAP, recommendation CPAP 8-12 cm H2O    CPAP compliance data:  Compliance download report from 9/14/19 to 10/13/19 reviewed today by me and showed patient is using machine 5:32 hrs/night with 73.3% compliance and AHI 4.2 within this time frame. 22/30days with greater than 4 hours of machine use. 90% pressure 9.5 cm H20 on auto CPAP 8-12 cm H2O    Compliance download report from 9/25/20 to 10/25/20 reviewed today by me and showed patient is using machine 7:27 hrs/night with 77.4% compliance and AHI 3.1 within this time frame. 24/30days with greater than 4 hours of machine use. 90% pressure 10.3 cm H20 on auto CPAP 9-13 cm H2O    Compliance download report from 1/25/21 to 2/23/21 reviewed today by me and showed patient is using machine 6:19 hrs/night with 80% compliance and AHI 2.3 within this time frame. 26/30days with greater than 4 hours of machine use. 90% pressure 10.5 cm H20 on 9-13 cm H2O    Compliance download report from 8/28/21 to 9/26/21 reviewed today by me and showed patient is using machine 6:32 hrs/night with 63% compliance and AHI 12 within this time frame. 19/30days with greater than 4 hours of machine use. 90% pressure 11 cm H20 on auto CPAP  9-13 cm H2O    Assessment:       · Mild VANESSA. Auto CPAP 9-13 cm H2O.   Optimal compliance on review today, residual AHI 12.  · Snoring-resolved on CPAP  · Fatigue-improving  · Obesity  · HTN        Plan:      · Send order to change to auto CPAP 10-14 cm H2O  · Follow AHI and adjust pressure if needed  · Advised to use CPAP 6-8 hrs at night and during naps. · Replacement of mask, tubing, head straps every 3-6 months or sooner if damaged. · Patient instructed to contact DME company for any mask, tubing or machine trouble shooting if problems arise. · Sleep hygiene  · Avoid sedatives, alcohol and caffeinated drinks at bed time. · No driving motorized vehicles or operating heavy machinery while fatigue, drowsy or sleepy. · Weight loss is also recommended as a long-term intervention. · Complications of VANESSA if not treated were discussed with patient patient to include systemic hypertension, pulmonary hypertension, cardiovascular morbidities, car accidents and all cause mortality. Patient education  provided regarding sleep tips and CPAP cleaning recommendations    -Discussed [employer] recently recalled some Pap units. Patient encouraged to call DME/ to see if her unit is on recall and register it if so. Discussed risks/benefits of untreated sleep apnea as well as risks/benefits of use of unit if recalled. At this time, if patients have moderate to severe sleep apnea or have severe daytime sleepiness, it is recommended continue therapy until the machine can be replaced. Discussed patient's risk based on increased daytime sleepiness without CPAP, hypoxia on sleep studies, comorbid cardiac conditions. Based upon the information we have so far, it appears the risk of stopping therapy may be higher than the risks associated with foam degradation. However, there are still many unknown variables and each patient will have to make a final determination on his or her own. Patient states she plans to continue current CPAP. Please only use cleaning methods recommended by .       Follow-up: 3 months, sooner if needed    Consent for telehealth visit was obtained and is noted in chart      [address]. Ankit Contreras is a 77 y.o. female being evaluated by a Virtual Visit (video visit) encounter to address concerns as mentioned above. A caregiver was present when appropriate. Due to this being a TeleHealth encounter (During TLOQV-51 public health emergency), evaluation of the following organ systems was limited: Vitals/Constitutional/EENT/Resp/CV/GI//MS/Neuro/Skin/Heme-Lymph-Imm. Pursuant to the emergency declaration under the [address] [place] [address], [address] [place] [address] authority and the [employer] and [employer] Act, this Virtual Visit was conducted with patient's (and/or legal guardian's) consent, to reduce the patient's risk of exposure to COVID-19 and provide necessary medical care. The patient (and/or legal guardian) has also been advised to contact this office for worsening conditions or problems, and seek emergency medical treatment and/or call 911 if deemed necessary. Patient identification was verified at the start of the visit: Yes    Total time spent for this encounter: Not billed by time    Services were provided through a video synchronous discussion virtually to substitute for in-person clinic visit. Patient and provider were located at their individual homes. --DEE Albert CNP on 9/28/2021 at 4:22 PM    An electronic signature was used to authenticate this note.            DEE Albert CNP

## 2021-09-28 NOTE — PATIENT INSTRUCTIONS

## 2021-11-12 ENCOUNTER — HOSPITAL ENCOUNTER (OUTPATIENT)
Dept: MAMMOGRAPHY | Age: 66
Discharge: HOME | End: 2021-11-12
Payer: MEDICARE

## 2021-11-12 DIAGNOSIS — Z12.31 SCREENING MAMMOGRAM, ENCOUNTER FOR: ICD-10-CM

## 2021-11-12 PROCEDURE — 77063 BREAST TOMOSYNTHESIS BI: CPT

## 2021-11-15 ENCOUNTER — TELEPHONE (OUTPATIENT)
Dept: MAMMOGRAPHY | Age: 66
End: 2021-11-15

## 2021-12-02 DIAGNOSIS — I10 ESSENTIAL HYPERTENSION: ICD-10-CM

## 2021-12-02 RX ORDER — METOPROLOL SUCCINATE 25 MG/1
TABLET, EXTENDED RELEASE ORAL
Qty: 90 TABLET | Refills: 3 | Status: SHIPPED | OUTPATIENT
Start: 2021-12-02

## 2021-12-02 RX ORDER — LOSARTAN POTASSIUM 100 MG/1
TABLET ORAL
Qty: 90 TABLET | Refills: 3 | Status: SHIPPED | OUTPATIENT
Start: 2021-12-02 | End: 2022-08-01 | Stop reason: SDUPTHER

## 2021-12-15 NOTE — TELEPHONE ENCOUNTER
CPAP download from 11/14/2021-12/13/2021 on auto CPAP 10-14 cm H2O reviewed. Compliance is good 90%. AHI has improved and is 5.0. Continue at current pressure for now.   Please get new report in about 30 days

## 2021-12-23 ENCOUNTER — OFFICE VISIT (OUTPATIENT)
Dept: FAMILY MEDICINE CLINIC | Age: 66
End: 2021-12-23
Payer: MEDICARE

## 2021-12-23 VITALS
BODY MASS INDEX: 36.9 KG/M2 | HEART RATE: 57 BPM | OXYGEN SATURATION: 97 % | DIASTOLIC BLOOD PRESSURE: 86 MMHG | SYSTOLIC BLOOD PRESSURE: 124 MMHG | WEIGHT: 235.6 LBS

## 2021-12-23 DIAGNOSIS — Z76.89 ENCOUNTER TO ESTABLISH CARE: Primary | ICD-10-CM

## 2021-12-23 DIAGNOSIS — K21.9 GASTROESOPHAGEAL REFLUX DISEASE WITHOUT ESOPHAGITIS: ICD-10-CM

## 2021-12-23 DIAGNOSIS — E03.9 ACQUIRED HYPOTHYROIDISM: ICD-10-CM

## 2021-12-23 DIAGNOSIS — E66.01 CLASS 2 SEVERE OBESITY WITH SERIOUS COMORBIDITY AND BODY MASS INDEX (BMI) OF 36.0 TO 36.9 IN ADULT, UNSPECIFIED OBESITY TYPE (HCC): ICD-10-CM

## 2021-12-23 DIAGNOSIS — I50.32 CHRONIC DIASTOLIC CONGESTIVE HEART FAILURE (HCC): ICD-10-CM

## 2021-12-23 DIAGNOSIS — Z00.00 HEALTHCARE MAINTENANCE: ICD-10-CM

## 2021-12-23 DIAGNOSIS — I10 ESSENTIAL HYPERTENSION: ICD-10-CM

## 2021-12-23 DIAGNOSIS — G47.33 MILD OBSTRUCTIVE SLEEP APNEA: ICD-10-CM

## 2021-12-23 LAB
A/G RATIO: 2.3 (ref 1.1–2.2)
ALBUMIN SERPL-MCNC: 4.5 G/DL (ref 3.4–5)
ALP BLD-CCNC: 132 U/L (ref 40–129)
ALT SERPL-CCNC: 17 U/L (ref 10–40)
ANION GAP SERPL CALCULATED.3IONS-SCNC: 13 MMOL/L (ref 3–16)
AST SERPL-CCNC: 16 U/L (ref 15–37)
BASOPHILS ABSOLUTE: 0 K/UL (ref 0–0.2)
BASOPHILS RELATIVE PERCENT: 0.9 %
BILIRUB SERPL-MCNC: 0.8 MG/DL (ref 0–1)
BUN BLDV-MCNC: 25 MG/DL (ref 7–20)
CALCIUM SERPL-MCNC: 9.3 MG/DL (ref 8.3–10.6)
CHLORIDE BLD-SCNC: 99 MMOL/L (ref 99–110)
CHOLESTEROL, TOTAL: 159 MG/DL (ref 0–199)
CO2: 26 MMOL/L (ref 21–32)
CREAT SERPL-MCNC: 1 MG/DL (ref 0.6–1.2)
EOSINOPHILS ABSOLUTE: 0.1 K/UL (ref 0–0.6)
EOSINOPHILS RELATIVE PERCENT: 2.3 %
GFR AFRICAN AMERICAN: >60
GFR NON-AFRICAN AMERICAN: 55
GLUCOSE BLD-MCNC: 147 MG/DL (ref 70–99)
HCT VFR BLD CALC: 43.9 % (ref 36–48)
HDLC SERPL-MCNC: 44 MG/DL (ref 40–60)
HEMOGLOBIN: 14.5 G/DL (ref 12–16)
LDL CHOLESTEROL CALCULATED: 99 MG/DL
LYMPHOCYTES ABSOLUTE: 1.2 K/UL (ref 1–5.1)
LYMPHOCYTES RELATIVE PERCENT: 23.6 %
MCH RBC QN AUTO: 29.8 PG (ref 26–34)
MCHC RBC AUTO-ENTMCNC: 33 G/DL (ref 31–36)
MCV RBC AUTO: 90.1 FL (ref 80–100)
MONOCYTES ABSOLUTE: 0.5 K/UL (ref 0–1.3)
MONOCYTES RELATIVE PERCENT: 10.3 %
NEUTROPHILS ABSOLUTE: 3.2 K/UL (ref 1.7–7.7)
NEUTROPHILS RELATIVE PERCENT: 62.9 %
PDW BLD-RTO: 14.2 % (ref 12.4–15.4)
PLATELET # BLD: 162 K/UL (ref 135–450)
PMV BLD AUTO: 9.5 FL (ref 5–10.5)
POTASSIUM REFLEX MAGNESIUM: 4.5 MMOL/L (ref 3.5–5.1)
RBC # BLD: 4.87 M/UL (ref 4–5.2)
SODIUM BLD-SCNC: 138 MMOL/L (ref 136–145)
T4 FREE: 1.4 NG/DL (ref 0.9–1.8)
TOTAL PROTEIN: 6.5 G/DL (ref 6.4–8.2)
TRIGL SERPL-MCNC: 80 MG/DL (ref 0–150)
TSH SERPL DL<=0.05 MIU/L-ACNC: 3.14 UIU/ML (ref 0.27–4.2)
VLDLC SERPL CALC-MCNC: 16 MG/DL
WBC # BLD: 5 K/UL (ref 4–11)

## 2021-12-23 PROCEDURE — G8484 FLU IMMUNIZE NO ADMIN: HCPCS | Performed by: NURSE PRACTITIONER

## 2021-12-23 PROCEDURE — 99397 PER PM REEVAL EST PAT 65+ YR: CPT | Performed by: NURSE PRACTITIONER

## 2021-12-23 SDOH — ECONOMIC STABILITY: HOUSING INSECURITY: IN THE LAST 12 MONTHS, HOW MANY PLACES HAVE YOU LIVED?: 1

## 2021-12-23 SDOH — ECONOMIC STABILITY: FOOD INSECURITY: WITHIN THE PAST 12 MONTHS, THE FOOD YOU BOUGHT JUST DIDN'T LAST AND YOU DIDN'T HAVE MONEY TO GET MORE.: NEVER TRUE

## 2021-12-23 SDOH — ECONOMIC STABILITY: TRANSPORTATION INSECURITY
IN THE PAST 12 MONTHS, HAS THE LACK OF TRANSPORTATION KEPT YOU FROM MEDICAL APPOINTMENTS OR FROM GETTING MEDICATIONS?: NO

## 2021-12-23 SDOH — ECONOMIC STABILITY: TRANSPORTATION INSECURITY
IN THE PAST 12 MONTHS, HAS LACK OF TRANSPORTATION KEPT YOU FROM MEETINGS, WORK, OR FROM GETTING THINGS NEEDED FOR DAILY LIVING?: NO

## 2021-12-23 SDOH — ECONOMIC STABILITY: FOOD INSECURITY: WITHIN THE PAST 12 MONTHS, YOU WORRIED THAT YOUR FOOD WOULD RUN OUT BEFORE YOU GOT MONEY TO BUY MORE.: NEVER TRUE

## 2021-12-23 SDOH — ECONOMIC STABILITY: INCOME INSECURITY: IN THE LAST 12 MONTHS, WAS THERE A TIME WHEN YOU WERE NOT ABLE TO PAY THE MORTGAGE OR RENT ON TIME?: NO

## 2021-12-23 SDOH — ECONOMIC STABILITY: HOUSING INSECURITY
IN THE LAST 12 MONTHS, WAS THERE A TIME WHEN YOU DID NOT HAVE A STEADY PLACE TO SLEEP OR SLEPT IN A SHELTER (INCLUDING NOW)?: NO

## 2021-12-23 ASSESSMENT — SOCIAL DETERMINANTS OF HEALTH (SDOH): HOW HARD IS IT FOR YOU TO PAY FOR THE VERY BASICS LIKE FOOD, HOUSING, MEDICAL CARE, AND HEATING?: NOT HARD AT ALL

## 2021-12-23 ASSESSMENT — PATIENT HEALTH QUESTIONNAIRE - PHQ9
2. FEELING DOWN, DEPRESSED OR HOPELESS: 0
SUM OF ALL RESPONSES TO PHQ QUESTIONS 1-9: 0
SUM OF ALL RESPONSES TO PHQ QUESTIONS 1-9: 0
1. LITTLE INTEREST OR PLEASURE IN DOING THINGS: 0
SUM OF ALL RESPONSES TO PHQ QUESTIONS 1-9: 0
SUM OF ALL RESPONSES TO PHQ9 QUESTIONS 1 & 2: 0

## 2021-12-23 ASSESSMENT — ENCOUNTER SYMPTOMS
EYES NEGATIVE: 1
GASTROINTESTINAL NEGATIVE: 1
RESPIRATORY NEGATIVE: 1

## 2021-12-23 NOTE — PROGRESS NOTES
2021    Clarice Lemos (:  1955) is a 77 y.o. female, here for a preventive medicine evaluation. Jyotsna Mccracken is here today to establish care as a new pt. Previous pt of Dr. Mireille Mendez. . Lives with her son. Retired (worked in IT). S/p hysterectomy. Has not been sexually active for several years. Denies alcohol, drug or tobacco use. H/o HTN. Monitors BP at home - stable. Taking medications as prescribed. H/o hypothyroidism. States she has not taken her synthroid in a year. H/o diabetes. Pt states that she was never told this. Mammogram done 21. Negative, no evidence of malignancy - f/u in 12 months. Last colonoscopy done 3/2016. H/o hyperplastic polyp - repeat colonoscopy 3/2026. H/o sleep apnea. Uses CPAP. Follows Dr. Janette Jolly. H/o stage 3 kidney failure. Follows Dr. Georgia Stone with [facility]. Taking lasix for LE edema. H/o CHF - stable. Follows Dr. Ana Wallace. H/o GERD - EGD done in , esophageal dysphagia. Continue omeprazole as directed. Patient Active Problem List   Diagnosis    Gastroesophageal reflux disease without esophagitis    Obesity, Class II, BMI 35-39.9, with comorbidity    Acquired hypothyroidism    Essential hypertension    Stress and adjustment reaction    Dermatitis    TATUM (dyspnea on exertion)    Abnormal EKG    Obesity (BMI 30-39. 9)    Chronic diastolic congestive heart failure (HCC)    Nail disorder    Heartburn    Esophageal dysphagia    Esophagitis    Mild obstructive sleep apnea       Review of Systems   Constitutional: Negative. Pt c/o difficulty trying to lose weight. HENT: Negative. Eyes: Negative. Respiratory: Negative. Cardiovascular: Negative. Gastrointestinal: Negative. Genitourinary: Negative. Musculoskeletal: Negative. Skin: Negative. Neurological: Negative. Psychiatric/Behavioral: Negative.         Prior to Visit Medications    Medication Sig Taking? Authorizing Provider   losartan (COZAAR) 100 MG tablet TAKE 1 TABLET BY MOUTH EVERY DAY Yes Cande Narayanan MD   metoprolol succinate (TOPROL XL) 25 MG extended release tablet TAKE 1 TABLET BY MOUTH EVERY DAY Yes Cande Narayanan MD   furosemide (LASIX) 40 MG tablet TAKE 1 TABLET BY MOUTH EVERY DAY Yes Cande Narayanan MD   doxazosin (CARDURA) 4 MG tablet Take 1 tablet by mouth nightly Yes Cande Narayanan MD   Calcium-Magnesium-Zinc 040-858-9 MG TABS Take by mouth Yes Historical Provider, MD   b complex vitamins capsule Take 1 capsule by mouth daily Yes Historical Provider, MD   olopatadine (PATADAY) 0.2 % SOLN ophthalmic solution 1 drop daily Yes Historical Provider, MD   clobetasol (TEMOVATE) 0.05 % cream Apply to affected area twice daily for up to 2 weeks or until improved.  Yes Tremaine Bhandari MD   Omega-3 Fatty Acids (FISH OIL) 1000 MG CAPS Take 1 capsule by mouth daily Yes DEE Aguilar - CNP   omeprazole (PRILOSEC OTC) 20 MG tablet Take 1 tablet by mouth daily Yes Michelle Louise MD   Biotin 3 MG TABS Take 3 mg by mouth daily Yes Historical Provider, MD   Cholecalciferol (VITAMIN D3 PO) Take 500 mg by mouth daily Yes Historical Provider, MD   mirabegron (MYRBETRIQ) 25 MG TB24   Historical Provider, MD   Efinaconazole (Chelsie Blind) 10 % SOLN Apply once daily to affected toenail  Patient not taking: Reported on 9/28/2021  Tremaine Bhandari MD        Allergies   Allergen Reactions    Sulfa Antibiotics Hives       Past Medical History:   Diagnosis Date    Acquired hypothyroidism 1/6/2016    Adjustment reaction with anxiety and depression 10/19/2015    Chronic diastolic congestive heart failure ([facility] [address] 9/30/2019    Depression     Essential hypertension 10/19/2015    Gastroesophageal reflux disease without esophagitis 10/19/2015    Hiatal hernia     Hypertension     Kidney failure     stage 3A    Obesity, Class II, BMI 35-39.9, with comorbidity 10/19/2015    Sleep apnea        Past Surgical History:   Procedure Laterality Date    COLONOSCOPY  2005    COLONOSCOPY      EGD COLONOSCOPY  1/9/2020    EGD ESOPHAGOGASTRODUODENOSCOPY DILATATION performed by Maxx Adame [facility]  [facility]    due to [address] N/A 1/9/2020    EGD BIOPSY performed by Maxx Adame MD at [address] Marital status:      Spouse name: Not on file    Number of children: Not on file    Years of education: Not on file    Highest education level: Not on file   Occupational History    Not on file   Tobacco Use    Smoking status: Never Smoker    Smokeless tobacco: Never Used   Vaping Use    Vaping Use: Never used   Substance and Sexual Activity    Alcohol use: Yes     Alcohol/week: 0.0 standard drinks    Drug use: Never    Sexual activity: Not Currently     Partners: Male   Other Topics Concern    Not on file   Social History Narrative    Not on file     Social Determinants of Health     Financial Resource Strain: Low Risk     Difficulty of Paying Living Expenses: Not hard at all   Food Insecurity: No Food Insecurity    Worried About Running Out of Food in the Last Year: Never true    [place] of Food in the Last Year: Never true   Transportation Needs: No Transportation Needs    Lack of Transportation (Medical): No    Lack of Transportation (Non-Medical):  No   Physical Activity:     Days of Exercise per Week: Not on file    Minutes of Exercise per Session: Not on file   Stress:     Feeling of Stress : Not on file   Social Connections:     Frequency of Communication with Friends and Family: Not on file    Frequency of Social Gatherings with Friends and Family: Not on file    Attends Yazidi Services: Not on file    Active Member of Clubs or Organizations: Not on file    Attends Club or Organization Meetings: Not on file    Marital Status: Not on file   Intimate Partner Violence:     Fear of Current or Ex-Partner: Not on file    Emotionally Abused: Not on file    Physically Abused: Not on file    Sexually Abused: Not on file   Housing Stability: [address] Unable to Pay for Housing in the Last Year: No    Number of Places Lived in the Last Year: 1    Unstable Housing in the Last Year: No        Family History   Problem Relation Age of Onset    Heart Disease Mother         A. Fib    Alzheimer's Disease Mother     Arthritis Father     Hearing Loss Father     Heart Disease Father     Other Father         Alpha 1 antitrypson deficiency   [de-identified] / [place] Sister     Other Sister         MS and Alpha 1 antitrypson deficiency    Kidney Disease Brother         Kidney stone    Stroke Maternal Grandmother     Heart Disease Maternal Grandmother     Diabetes Paternal Grandfather     Diabetes Paternal Aunt     Other Paternal Uncle         Alpha 1 antitrypson deficiency    Early Death Maternal Grandfather 47    Other Paternal Grandmother         Alpha 1 antitrypson deficiency    Cirrhosis Paternal Grandmother         Non alcoholic    Asthma Neg Hx     Birth Defects Neg Hx     Cancer Neg Hx     Depression Neg Hx     High Blood Pressure Neg Hx     High Cholesterol Neg Hx     Learning Disabilities Neg Hx     Mental Illness Neg Hx     Mental Retardation Neg Hx     Substance Abuse Neg Hx        ADVANCE DIRECTIVE: N, <no information>    Vitals:    12/23/21 1018   BP: 124/86   Site: Right Upper Arm   Position: Sitting   Cuff Size: Large Adult   Pulse: 57   SpO2: 97%   Weight: 235 lb 9.6 oz (106.9 kg)     Estimated body mass index is 36.9 kg/m² as calculated from the following:    Height as of 11/16/20: 5' 7\" (1.702 m). Weight as of this encounter: 235 lb 9.6 oz (106.9 kg). Physical Exam  Vitals reviewed. Constitutional:       Appearance: Normal appearance. She is obese.    HENT:      Head: Normocephalic. Neck:      Thyroid: No thyroid mass, thyromegaly or thyroid tenderness. Cardiovascular:      Rate and Rhythm: Normal rate and regular rhythm. Heart sounds: Normal heart sounds. Pulmonary:      Effort: Pulmonary effort is normal.      Breath sounds: Normal breath sounds. Musculoskeletal:      Cervical back: Normal range of motion. Lymphadenopathy:      Cervical: No cervical adenopathy. Skin:     General: Skin is warm and dry. Neurological:      Mental Status: She is alert and oriented to person, place, and time. Psychiatric:         Mood and Affect: Mood normal.         Behavior: Behavior normal.         Thought Content: Thought content normal.         Judgment: Judgment normal.         No flowsheet data found.     Lab Results   Component Value Date    CHOL 160 12/13/2019    CHOL 156 10/18/2018    CHOL 183 10/12/2017    TRIG 95 12/13/2019    TRIG 74 10/18/2018    TRIG 112 10/12/2017    HDL 46 12/13/2019    HDL 40 10/18/2018    HDL 40 10/12/2017    LDLCALC 95 12/13/2019    LDLCALC 101 10/18/2018    LDLCALC 121 10/12/2017    GLUCOSE 233 05/15/2021    LABA1C 7.0 07/10/2020    LABA1C 6.5 12/13/2019    LABA1C 6.5 10/12/2017       The 10-year ASCVD risk score (Valerie Goodman et al., 2013) is: 14%    Values used to calculate the score:      Age: 77 years      Sex: Female      Is Non- : No      Diabetic: Yes      Tobacco smoker: No      Systolic Blood Pressure: 193 mmHg      Is BP treated: Yes      HDL Cholesterol: 46 mg/dL      Total Cholesterol: 160 mg/dL    Immunization History   Administered Date(s) Administered    Influenza Virus Vaccine 10/19/2015, 10/12/2017, 09/18/2018, 10/01/2020    Influenza, High-dose, Quadv, 65 yrs +, IM (Fluzone) 11/11/2020, 10/27/2021    Influenza, MDCK Quadv, IM, PF (Flucelvax 2 yrs and older) 09/19/2018, 11/18/2019    Influenza, Quadv, IM, (6 mo and older Fluzone, Flulaval, Fluarix and 3 yrs and older Afluria) 10/12/2017    Influenza, Quadv, IM, PF (6 mo and older Fluzone, Flulaval, Fluarix, and 3 yrs and older Afluria) 10/05/2016    Pneumococcal Conjugate 13-valent ([place]) 10/27/2021    Tdap (Boostrix, Adacel) 01/01/2012    Zoster Live (Zostavax) 10/12/2017    Zoster Recombinant (Shingrix) 10/27/2021       Health Maintenance   Topic Date Due    COVID-19 Vaccine (1) Never done    Diabetic retinal exam  Never done    Diabetic foot exam  12/13/2020    Lipid screen  12/13/2020    Colon cancer screen colonoscopy  03/31/2021    A1C test (Diabetic or Prediabetic)  07/10/2021    TSH testing  07/10/2021    Diabetic microalbuminuria test  07/29/2021    Annual Wellness Visit (AWV)  Never done    Shingles Vaccine (3 of 3) 12/22/2021    DTaP/Tdap/Td vaccine (2 - Td or Tdap) 01/01/2022    Potassium monitoring  05/15/2022    Creatinine monitoring  05/15/2022    Pneumococcal 65+ years Vaccine (2 of 2 - PPSV23) 10/27/2022    Breast cancer screen  11/12/2023    DEXA (modify frequency per FRAX score)  Completed    Flu vaccine  Completed    Hepatitis C screen  Completed    Hepatitis A vaccine  Aged Out    Hib vaccine  Aged Out    Meningococcal (ACWY) vaccine  Aged Out          ASSESSMENT/PLAN:    1. Encounter to establish care  Previous pt of Dr. Jordan Chavis. 2. Healthcare maintenance  Encouraged healthy diet and exercise. Recommended routine dental and vision exams. Will check fasting labs today. -     CBC Auto Differential  -     Comprehensive Metabolic Panel w/ Reflex to MG  -     Hemoglobin A1C  -     Lipid Panel  -     T4, Free  -     TSH without Reflex    3. Gastroesophageal reflux disease without esophagitis  Continue omeprazole as directed. Previously managed by [employer] [facility]. 4. Acquired hypothyroidism  Not currently on medication. Will check labs today. -     T4, Free  -     TSH without Reflex    5. Essential hypertension  Stable. Managed by cardiology.     -     Comprehensive Metabolic Panel w/ Reflex to

## 2021-12-23 NOTE — PATIENT INSTRUCTIONS
Patient Education        When You Are Overweight: Care Instructions  Your Care Instructions     If you're overweight, your doctor may recommend that you make changes in your eating and exercise habits. Being overweight can lead to serious health problems, such as high blood pressure, heart disease, type 2 diabetes, and arthritis, or it can make these problems worse. Eating a healthy diet and being more active can help you reach and stay at a healthy weight. You don't have to make huge changes all at once. Start by making small changes in your eating and exercise habits. To lose weight, you need to burn more calories than you take in. You can do this by eating healthy foods in reasonable amounts and becoming more active every day. Follow-up care is a key part of your treatment and safety. Be sure to make and go to all appointments, and call your doctor if you are having problems. It's also a good idea to know your test results and keep a list of the medicines you take. How can you care for yourself at home? · Improve your eating habits. You'll be more successful if you work on changing one eating habit at a time. All foods, if eaten in moderation, can be part of healthy eating. Remember to:  ? Eat a variety of foods from each food group. Include grains, vegetables, fruits, dairy, and protein foods. ? Limit foods high in fat, sugar, and calories. ? Eat slowly. And don't do anything else, such as watch TV, while you are eating. ? Pay attention to portion sizes. Put your food on a smaller plate. ? Plan your meals ahead of time. You'll be less likely to grab something that's not as healthy. · Get active. Regular activity can help you feel better, have more energy, and burn more calories. If you haven't been active, start slowly. Start with at least 30 minutes of moderate activity on most days of the week. Then gradually increase the amount of activity. Try for 60 or 90 minutes a day, at least 5 days a week. There are a lot of ways to fit activity into your life. You can:  ? Walk or bike to the store. Or walk with a friend, or walk the dog.  ? Mow the lawn, rake leaves, shovel snow, or do some gardening. ? Use the stairs instead of the elevator, at least for a few floors. · Change your thinking. Your thoughts have a lot to do with how you feel and what you do. When you're trying to reach a healthy weight, changing how you think about certain things may help. Here are some ideas:  ? Don't compare yourself to others. Healthy bodies come in all shapes and sizes. ? Pay attention to how hungry or full you feel. When you eat, be aware of why you're eating and how much you're eating. ? Focus on improving your health instead of dieting. Dieting almost never works over the long term. · Ask your doctor about other health professionals who can help you reach a healthy weight. ? A dietitian can help you make healthy changes in your diet. ? An exercise specialist or  can help you develop a safe and effective exercise program.  ? A counselor or psychiatrist can help you cope with issues such as depression, anxiety, or family problems that can make it hard to focus on reaching a healthy weight. · Get support from your family, your doctor, your friends, a support group--and support yourself. Where can you learn more? Go to https://[employer].Medine. org and sign in to your [employer] account. Enter X007 in the [employer] box to learn more about \"When You Are Overweight: Care Instructions. \"     If you do not have an account, please click on the \"Sign Up Now\" link. Current as of: March 17, 2021               Content Version: 13.1  © 7704-1070 Healthwise, Incorporated. Care instructions adapted under license by [facility] ([facility]).  If you have questions about a medical condition or this instruction, always ask your healthcare professional. Braden Corona disclaims any warranty or breathing and mindfulness, and stay connected with your family and community. If you find you often feel sad or hopeless, talk with your doctor. Treatment can help. · Talk to your doctor about whether you have any risk factors for sexually transmitted infections (STIs). You can help prevent STIs if you wait to have sex with a new partner (or partners) until you've each been tested for STIs. It also helps if you use condoms (male or female condoms) and if you limit your sex partners to one person who only has sex with you. Vaccines are available for some STIs. · If you think you may have a problem with alcohol or drug use, talk to your doctor. This includes prescription medicines (such as amphetamines and opioids) and illegal drugs (such as cocaine and methamphetamine). Your doctor can help you figure out what type of treatment is best for you. · Protect your skin from too much sun. When you're outdoors from 10 a.m. to 4 p.m., stay in the shade or cover up with clothing and a hat with a wide brim. Wear sunglasses that block UV rays. Even when it's cloudy, put broad-spectrum sunscreen (SPF 30 or higher) on any exposed skin. · See a dentist one or two times a year for checkups and to have your teeth cleaned. · Wear a seat belt in the car. When should you call for help? Watch closely for changes in your health, and be sure to contact your doctor if you have any problems or symptoms that concern you. Where can you learn more? Go to https://LearnSproutkevin.health-partners. org and sign in to your [employer] account. Enter T874 in the [facility] box to learn more about \"Well Visit, Over 65: Care Instructions. \"     If you do not have an account, please click on the \"Sign Up Now\" link. Current as of: October 6, 2021               Content Version: 13.1  © 5009-8607 Healthwise, Incorporated. Care instructions adapted under license by [facility] ([facility]).  If you have questions about a medical condition or this instruction, always ask your healthcare professional. [place] [address] any warranty or liability for your use of this information.

## 2021-12-24 LAB
ESTIMATED AVERAGE GLUCOSE: 128.4 MG/DL
HBA1C MFR BLD: 6.1 %

## 2022-01-20 NOTE — TELEPHONE ENCOUNTER
CPAP download report from 12/17/2021 - 1/15/2022 on auto CPAP 10-14 cm H2O reviewed. Compliance is adequate 76%. AHI is slightly elevated 5.9.       Please send order to change to auto CPAP 11-15 cm H2O and look for new report about 30 days after pressure is changed

## 2022-01-25 ENCOUNTER — HOSPITAL ENCOUNTER (OUTPATIENT)
Age: 67
Discharge: HOME | End: 2022-01-25
Payer: MEDICARE

## 2022-01-25 ENCOUNTER — VIRTUAL VISIT (OUTPATIENT)
Dept: FAMILY MEDICINE CLINIC | Age: 67
End: 2022-01-25
Payer: MEDICARE

## 2022-01-25 ENCOUNTER — HOSPITAL ENCOUNTER (OUTPATIENT)
Dept: GENERAL RADIOLOGY | Age: 67
Discharge: HOME | End: 2022-01-25
Payer: MEDICARE

## 2022-01-25 DIAGNOSIS — R05.9 COUGH: ICD-10-CM

## 2022-01-25 DIAGNOSIS — Z20.822 EXPOSURE TO COVID-19 VIRUS: ICD-10-CM

## 2022-01-25 DIAGNOSIS — R06.09 DOE (DYSPNEA ON EXERTION): ICD-10-CM

## 2022-01-25 DIAGNOSIS — R06.09 DOE (DYSPNEA ON EXERTION): Primary | ICD-10-CM

## 2022-01-25 PROCEDURE — 1123F ACP DISCUSS/DSCN MKR DOCD: CPT | Performed by: NURSE PRACTITIONER

## 2022-01-25 PROCEDURE — 1090F PRES/ABSN URINE INCON ASSESS: CPT | Performed by: NURSE PRACTITIONER

## 2022-01-25 PROCEDURE — 4040F PNEUMOC VAC/ADMIN/RCVD: CPT | Performed by: NURSE PRACTITIONER

## 2022-01-25 PROCEDURE — G8399 PT W/DXA RESULTS DOCUMENT: HCPCS | Performed by: NURSE PRACTITIONER

## 2022-01-25 PROCEDURE — 3017F COLORECTAL CA SCREEN DOC REV: CPT | Performed by: NURSE PRACTITIONER

## 2022-01-25 PROCEDURE — 71046 X-RAY EXAM CHEST 2 VIEWS: CPT

## 2022-01-25 PROCEDURE — G8427 DOCREV CUR MEDS BY ELIG CLIN: HCPCS | Performed by: NURSE PRACTITIONER

## 2022-01-25 PROCEDURE — 99213 OFFICE O/P EST LOW 20 MIN: CPT | Performed by: NURSE PRACTITIONER

## 2022-01-25 ASSESSMENT — ENCOUNTER SYMPTOMS
WHEEZING: 0
TROUBLE SWALLOWING: 0
COUGH: 1
SHORTNESS OF BREATH: 1
CHOKING: 0
RHINORRHEA: 0
SINUS PAIN: 0
SINUS PRESSURE: 0
VOICE CHANGE: 0
SORE THROAT: 0
GASTROINTESTINAL NEGATIVE: 1
CHEST TIGHTNESS: 0
APNEA: 0
STRIDOR: 0

## 2022-01-25 NOTE — PROGRESS NOTES
Pt came in to the [facility] for eval.  O2 sat was 95% on room air. /78, HR 76 bpm, and temp was 99.2. Lung sounds were clear. Chest xray done PTA. Will f/u with pt regarding results and POC.

## 2022-01-25 NOTE — PROGRESS NOTES
2022    TELEHEALTH EVALUATION -- Audio/Visual (During ZKYAC-81 public health emergency)    HPI:    Mehran Kingsley (:  1955) has requested an audio/video evaluation for the following concern(s):    Pt c/o fatigue, congestion, TATUM, fever and cough for the past 5 days. States that she did have a sore throat and headache in the beginning, but those symptoms have resolved. Tmax 100. States that her son recently tested positive for COVID-19 on 1/10/21. Pt had a COVID-19 test one day before her symptoms started and it was negative. Pt is not UTD on the COVID-19 vaccine. Review of Systems   Constitutional: Positive for appetite change, fatigue and fever. Negative for chills and diaphoresis. Decreased appetite. HENT: Positive for congestion. Negative for ear pain, nosebleeds, postnasal drip, rhinorrhea, sinus pressure, sinus pain, sneezing, sore throat, tinnitus, trouble swallowing and voice change. States that everything tastes \"too salty\". Respiratory: Positive for cough and shortness of breath. Negative for apnea, choking, chest tightness, wheezing and stridor. Cardiovascular: Negative. Gastrointestinal: Negative. Skin: Negative. Neurological: Negative. Prior to Visit Medications    Medication Sig Taking?  Authorizing Provider   losartan (COZAAR) 100 MG tablet TAKE 1 TABLET BY MOUTH EVERY DAY Yes Carlotta Farris MD   metoprolol succinate (TOPROL XL) 25 MG extended release tablet TAKE 1 TABLET BY MOUTH EVERY DAY Yes Carlotta Farris MD   furosemide (LASIX) 40 MG tablet TAKE 1 TABLET BY MOUTH EVERY DAY Yes Carlotta Farris MD   doxazosin (CARDURA) 4 MG tablet Take 1 tablet by mouth nightly Yes Carlotta Farris MD   Calcium-Magnesium-Zinc 516-692-8 MG TABS Take by mouth Yes Historical Provider, MD   b complex vitamins capsule Take 1 capsule by mouth daily Yes Historical Provider, MD   olopatadine (PATADAY) 0.2 % SOLN ophthalmic solution 1 drop daily Yes Historical Provider, MD   clobetasol (TEMOVATE) 0.05 % cream Apply to affected area twice daily for up to 2 weeks or until improved. Yes Tariq Mendez MD   Omega-3 Fatty Acids (FISH OIL) 1000 MG CAPS Take 1 capsule by mouth daily Yes EDE Downey CNP   omeprazole (PRILOSEC OTC) 20 MG tablet Take 1 tablet by mouth daily Yes Michelle Louise MD   Biotin 3 MG TABS Take 3 mg by mouth daily Yes Historical Provider, MD   Cholecalciferol (VITAMIN D3 PO) Take 500 mg by mouth daily Yes Historical Provider, MD       Social History     Tobacco Use    Smoking status: Never Smoker    Smokeless tobacco: Never Used   Vaping Use    Vaping Use: Never used   Substance Use Topics    Alcohol use: Yes     Alcohol/week: 0.0 standard drinks    Drug use: Never        Allergies   Allergen Reactions    Sulfa Antibiotics Hives   ,   Past Medical History:   Diagnosis Date    Acquired hypothyroidism 1/6/2016    Adjustment reaction with anxiety and depression 10/19/2015    Chronic diastolic congestive heart failure ([facility] [address] 9/30/2019    Depression     Essential hypertension 10/19/2015    Gastroesophageal reflux disease without esophagitis 10/19/2015    Hiatal hernia     Hypertension     Kidney failure     stage 3A    Obesity, Class II, BMI 35-39.9, with comorbidity 10/19/2015    Sleep apnea    ,   Past Surgical History:   Procedure Laterality Date    COLONOSCOPY  2005    COLONOSCOPY      EGD COLONOSCOPY  1/9/2020    EGD ESOPHAGOGASTRODUODENOSCOPY DILATATION performed by Jeanette Read MD at [address] [facility] [address]  [address]    due to [address] N/A 1/9/2020    EGD BIOPSY performed by Jeanette Read MD at [address] [facility]   ,   Social History     Tobacco Use    Smoking status: Never Smoker    Smokeless tobacco: Never Used   Vaping Use    Vaping Use: Never used   Substance Use Topics    Alcohol use:  Yes     Alcohol/week: 0.0 standard drinks    Drug use: Never   ,   Family History   Problem Relation Age of Onset    Heart Disease Mother         A.  Fib    Alzheimer's Disease Mother     Arthritis Father     Hearing Loss Father     Heart Disease Father     Other Father         Alpha 1 antitrypson deficiency   [de-identified] / Stillbirths Sister     Other Sister         MS and Alpha 1 antitrypson deficiency    Kidney Disease Brother         Kidney stone    Stroke Maternal Grandmother     Heart Disease Maternal Grandmother     Diabetes Paternal Grandfather     Diabetes Paternal Aunt     Other Paternal Uncle         Alpha 1 antitrypson deficiency    Early Death Maternal Grandfather 47    Other Paternal Grandmother         Alpha 1 antitrypson deficiency    Cirrhosis Paternal Grandmother         Non alcoholic    Asthma Neg Hx     Birth Defects Neg Hx     Cancer Neg Hx     Depression Neg Hx     High Blood Pressure Neg Hx     High Cholesterol Neg Hx     Learning Disabilities Neg Hx     Mental Illness Neg Hx     Mental Retardation Neg Hx     Substance Abuse Neg Hx    ,   Immunization History   Administered Date(s) Administered    Influenza Virus Vaccine 10/19/2015, 10/12/2017, 09/18/2018, 10/01/2020    Influenza, High-dose, Quadv, 65 yrs +, IM (Fluzone) 11/11/2020, 10/27/2021    Influenza, MDCK Quadv, IM, PF (Flucelvax 2 yrs and older) 09/19/2018, 11/18/2019    Influenza, Quadv, IM, (6 mo and older Fluzone, Flulaval, Fluarix and 3 yrs and older Afluria) 10/12/2017    Influenza, Quadv, IM, PF (6 mo and older Fluzone, Flulaval, Fluarix, and 3 yrs and older Afluria) 10/05/2016    Pneumococcal Conjugate 13-valent (Pauline Milder) 10/27/2021    Tdap (Boostrix, Adacel) 01/01/2012    Zoster Live (Zostavax) 10/12/2017    Zoster Recombinant (Shingrix) 10/27/2021   ,   Health Maintenance   Topic Date Due    COVID-19 Vaccine (1) Never done    Colon cancer screen colonoscopy  03/31/2021    Annual Wellness Visit (AWV)  Never done   Jennifer Shingles Vaccine (3 of 3) 12/22/2021    DTaP/Tdap/Td vaccine (2 - Td or Tdap) 01/01/2022    Pneumococcal 65+ years Vaccine (2 of 2 - PPSV23) 10/27/2022    A1C test (Diabetic or Prediabetic)  12/23/2022    Depression Screen  12/23/2022    TSH testing  12/23/2022    Potassium monitoring  12/23/2022    Creatinine monitoring  12/23/2022    Breast cancer screen  11/12/2023    Lipid screen  12/23/2026    DEXA (modify frequency per FRAX score)  Completed    Flu vaccine  Completed    Hepatitis C screen  Completed    Hepatitis A vaccine  Aged Out    Hib vaccine  Aged Out    Meningococcal (ACWY) vaccine  Aged Out       PHYSICAL EXAMINATION:  [ INSTRUCTIONS:  \"[x]\" Indicates a positive item  \"[]\" Indicates a negative item  -- DELETE ALL ITEMS NOT EXAMINED]  Vital Signs: (As obtained by patient/caregiver or practitioner observation)    Blood pressure-  Heart rate-    Respiratory rate-    Temperature-  Pulse oximetry-     Constitutional: [x] Appears well-developed and well-nourished [x] No apparent distress      [] Abnormal-   Mental status  [x] Alert and awake  [x] Oriented to person/place/time [x]Able to follow commands      Eyes:  EOM    []  Normal  [] Abnormal-  Sclera  []  Normal  [] Abnormal -         Discharge []  None visible  [] Abnormal -    HENT:   [x] Normocephalic, atraumatic.   [] Abnormal   [x] Mouth/Throat: Mucous membranes are moist.     External Ears [] Normal  [] Abnormal-     Neck: [] No visualized mass     Pulmonary/Chest: [x] Respiratory effort normal.  [x] No visualized signs of difficulty breathing or respiratory distress        [] Abnormal-      Musculoskeletal:   [] Normal gait with no signs of ataxia         [x] Normal range of motion of neck        [] Abnormal-       Neurological:        [x] No Facial Asymmetry (Cranial nerve 7 motor function) (limited exam to video visit)          [x] No gaze palsy        [] Abnormal-         Skin:        [x] No significant exanthematous lesions or

## 2022-01-26 DIAGNOSIS — U07.1 PNEUMONIA DUE TO COVID-19 VIRUS: ICD-10-CM

## 2022-01-26 DIAGNOSIS — U07.1 COVID-19: Primary | ICD-10-CM

## 2022-01-26 DIAGNOSIS — J12.82 PNEUMONIA DUE TO COVID-19 VIRUS: ICD-10-CM

## 2022-01-26 LAB — SARS-COV-2: DETECTED

## 2022-01-26 RX ORDER — AZITHROMYCIN 250 MG/1
250 TABLET, FILM COATED ORAL SEE ADMIN INSTRUCTIONS
Qty: 6 TABLET | Refills: 0 | Status: SHIPPED | OUTPATIENT
Start: 2022-01-26 | End: 2022-01-31

## 2022-01-26 RX ORDER — ALBUTEROL SULFATE 90 UG/1
2 AEROSOL, METERED RESPIRATORY (INHALATION) EVERY 6 HOURS PRN
Qty: 18 G | Refills: 0 | Status: SHIPPED | OUTPATIENT
Start: 2022-01-26 | End: 2022-06-23

## 2022-01-26 RX ORDER — PREDNISONE 20 MG/1
40 TABLET ORAL DAILY
Qty: 10 TABLET | Refills: 0 | Status: SHIPPED | OUTPATIENT
Start: 2022-01-26 | End: 2022-01-31

## 2022-02-10 ENCOUNTER — HOSPITAL ENCOUNTER (OUTPATIENT)
Age: 67
Discharge: HOME | End: 2022-02-10
Payer: MEDICARE

## 2022-02-10 ENCOUNTER — HOSPITAL ENCOUNTER (OUTPATIENT)
Dept: GENERAL RADIOLOGY | Age: 67
Discharge: HOME | End: 2022-02-10
Payer: MEDICARE

## 2022-02-10 DIAGNOSIS — J12.82 PNEUMONIA DUE TO COVID-19 VIRUS: ICD-10-CM

## 2022-02-10 DIAGNOSIS — N18.31 STAGE 3A CHRONIC KIDNEY DISEASE (HCC): ICD-10-CM

## 2022-02-10 DIAGNOSIS — U07.1 COVID-19: ICD-10-CM

## 2022-02-10 DIAGNOSIS — U07.1 PNEUMONIA DUE TO COVID-19 VIRUS: ICD-10-CM

## 2022-02-10 PROCEDURE — 80048 BASIC METABOLIC PNL TOTAL CA: CPT

## 2022-02-10 PROCEDURE — 36415 COLL VENOUS BLD VENIPUNCTURE: CPT

## 2022-02-10 PROCEDURE — 71046 X-RAY EXAM CHEST 2 VIEWS: CPT

## 2022-02-11 LAB
ANION GAP SERPL CALCULATED.3IONS-SCNC: 14 MMOL/L (ref 3–16)
BUN BLDV-MCNC: 20 MG/DL (ref 7–20)
CALCIUM SERPL-MCNC: 9.1 MG/DL (ref 8.3–10.6)
CHLORIDE BLD-SCNC: 102 MMOL/L (ref 99–110)
CO2: 23 MMOL/L (ref 21–32)
CREAT SERPL-MCNC: 1 MG/DL (ref 0.6–1.2)
GFR AFRICAN AMERICAN: >60
GFR NON-AFRICAN AMERICAN: 55
GLUCOSE BLD-MCNC: 153 MG/DL (ref 70–99)
POTASSIUM SERPL-SCNC: 4.1 MMOL/L (ref 3.5–5.1)
SODIUM BLD-SCNC: 139 MMOL/L (ref 136–145)

## 2022-03-01 ENCOUNTER — TELEPHONE (OUTPATIENT)
Dept: PULMONOLOGY | Age: 67
End: 2022-03-01

## 2022-03-01 ENCOUNTER — TELEMEDICINE (OUTPATIENT)
Dept: SLEEP MEDICINE | Age: 67
End: 2022-03-01
Payer: MEDICARE

## 2022-03-01 DIAGNOSIS — E66.9 OBESITY (BMI 30-39.9): ICD-10-CM

## 2022-03-01 DIAGNOSIS — G47.31 CSA (CENTRAL SLEEP APNEA): ICD-10-CM

## 2022-03-01 DIAGNOSIS — G47.33 MILD OBSTRUCTIVE SLEEP APNEA: Primary | ICD-10-CM

## 2022-03-01 DIAGNOSIS — R68.2 DRY MOUTH: ICD-10-CM

## 2022-03-01 PROCEDURE — G8427 DOCREV CUR MEDS BY ELIG CLIN: HCPCS | Performed by: NURSE PRACTITIONER

## 2022-03-01 PROCEDURE — 99214 OFFICE O/P EST MOD 30 MIN: CPT | Performed by: NURSE PRACTITIONER

## 2022-03-01 PROCEDURE — 1090F PRES/ABSN URINE INCON ASSESS: CPT | Performed by: NURSE PRACTITIONER

## 2022-03-01 PROCEDURE — 4040F PNEUMOC VAC/ADMIN/RCVD: CPT | Performed by: NURSE PRACTITIONER

## 2022-03-01 PROCEDURE — 3017F COLORECTAL CA SCREEN DOC REV: CPT | Performed by: NURSE PRACTITIONER

## 2022-03-01 PROCEDURE — G8399 PT W/DXA RESULTS DOCUMENT: HCPCS | Performed by: NURSE PRACTITIONER

## 2022-03-01 PROCEDURE — 1123F ACP DISCUSS/DSCN MKR DOCD: CPT | Performed by: NURSE PRACTITIONER

## 2022-03-01 ASSESSMENT — SLEEP AND FATIGUE QUESTIONNAIRES
ESS TOTAL SCORE: 3
HOW LIKELY ARE YOU TO NOD OFF OR FALL ASLEEP WHILE SITTING INACTIVE IN A PUBLIC PLACE: 0
HOW LIKELY ARE YOU TO NOD OFF OR FALL ASLEEP WHILE SITTING QUIETLY AFTER LUNCH WITHOUT ALCOHOL: 0
HOW LIKELY ARE YOU TO NOD OFF OR FALL ASLEEP IN A CAR, WHILE STOPPED FOR A FEW MINUTES IN TRAFFIC: 0
HOW LIKELY ARE YOU TO NOD OFF OR FALL ASLEEP WHILE LYING DOWN TO REST IN THE AFTERNOON WHEN CIRCUMSTANCES PERMIT: 2
HOW LIKELY ARE YOU TO NOD OFF OR FALL ASLEEP WHILE WATCHING TV: 0
HOW LIKELY ARE YOU TO NOD OFF OR FALL ASLEEP WHEN YOU ARE A PASSENGER IN A CAR FOR AN HOUR WITHOUT A BREAK: 0
HOW LIKELY ARE YOU TO NOD OFF OR FALL ASLEEP WHILE SITTING AND READING: 1
HOW LIKELY ARE YOU TO NOD OFF OR FALL ASLEEP WHILE SITTING AND TALKING TO SOMEONE: 0

## 2022-03-01 NOTE — PATIENT INSTRUCTIONS
enough blankets to stay warm is recommended. If your room is too noisy, try a white noise machine. If too bright, try black out shades or an eye mask. Dont take worries to bed. Leave worries about work, school etc. behind you when you go to bed. Some people find it helpful to assign a worry period in the evening or late afternoon to write down your worries and get them out of your system. CPAP Equipment Cleaning and Disinfecting Schedule  Equipment Cleaning Frequency Instructions  Disinfecting Frequency   Non-Disposable Filters  Weekly Mild soapy water, Rinse, Air Dry Not Required   Disposable Filters Change as needed  2-4 weeks Do Not Wash Not Required   Hose/tubing Daily Mild soapy water, Rinse, Air Dry Once a week   Mask / Nasal Pillows Daily Mild soapy water, Rinse, Air Dry Once a week   Headgear Weekly Hand wash, Mild soapy water, Rinse, Dry  Not Required   Humidifier Daily Empty water daily  Mild soapy water, Rinse well, Air Dry  Once a week   CPAP Unit As Needed Dust with damp cloth,  No detergents or sprays Not Required         Disinfect (per schedule) with 1 part white vinegar and 3 parts water- soak mask and water chamber for 30 minutes every 1-2 weeks, more often if sick. Allow water/vinegar mixture to run through tubing. Allow all equipment to air dry. Drying Hints:   Always hang tubing away from direct sunlight, as this will cause the tubing to become yellow, brittle and crack over a period of time. DO NOT attach the wet tubing to your CPAP unit to blow-dry it. The moisture from the tubing can drain back into your machine. Moisture in your unit can cause sudden pressure increases or short circuits  DO's and DON'Ts:  - Don't use alcohol-based products to clean your mask, because it can cause the materials to become hard and brittle.    - Don't put headgear in the washer or dryer  - Don't use any caustic or household cleaning solutions such as bleach on your CPAP   equipment.  - Do follow the recommended cleaning schedule. - Do change your disposable filter frequently. Adapted From: [email or web address] shtm.   These are general suggestions for all models please follow specific s recommendations and specific instructions

## 2022-03-01 NOTE — PROGRESS NOTES
Patient ID: Heron Sales is a 77 y.o. female who is being seen today for   Chief Complaint   Patient presents with    Sleep Apnea     1 year sleep      Referring: Dr. Danielle Seip    HPI:     Heron Sales is a 77 y.o. female for televideo appointment via video and audio doxy. me virtual visit for VANESSA follow up. States she is doing okay with CPAP. Patient is using CPAP 5-6 hrs/night. Using humidifier. No snoring on CPAP. The pressure is well tolerated. The mask is comfortable- nasal. No mask leak. No significant daytime sleepiness. No nodding off when driving. +dry mouth- declines full face or chin strap. No fatigue. Bedtime is MN  and rise time is 9 am. Sleep onset is few minutes. Wakes up 1 times at night total. 1 nocturia. It takes usually few minutes to fall back a sleep. Rare naps during the day. No headache in am. No weight gain. 0-1 caffienated beverages during the day. Rare alcohol. ESS is 3. Previous 9/28/21  Heron Sales is a 77 y.o. female for televideo appointment via video and audio doxy. me virtual visit for VANESSA follow up. Patient is using CPAP 6-8 hrs/night. She did forget a piece of her mask when she went on vacation recently and was unable to use for about a week. She was more tired when not using CPAP with increased morning headaches and napping. Using humidifier. No snoring on CPAP. The pressure is well tolerated. The mask is somewhat comfortable- dreamwear nasal . States she might want to try brigitte loops. No mask leak. No significant daytime sleepiness. No nodding off when driving. No dry nose or throat. No fatigue. Bedtime is MN and rise time is 9 am. Sleep onset is few minutes. Wakes up 0-1 times at night total. 0-1 nocturia. It takes few minutes to fall back a sleep. few naps during the day. +headache in am since not using CPAP. No weight gain. 0-1 caffienated beverages during the day. Occasional alcohol. ESS is 3        Previous HPI 6/4/19  Heron Sales is a 77 y.o. female in office for VANESSA follow up. HST was reviewed by me and noted below. Results were dicussed with patient and multiple good questions were answered. It showed no significant sleep related breathing disorder however low SPO2 83% in under 89% for 39.4 minutes     Bedtime is MN and rise time is 7 am Sleep onset is few minutes. Wakes up 0-3 times at night total. 0-3 nocturia. It takes few minutes to fall back a sleep. No naps during the day. Occasional headache in am. No weight gain. 2 caffienated beverages during the day. No alcohol. ESS is 4      Initial HPI 5/22/19  Karina Ferguson is a 77 y.o. female in office for sleep apnea evaluation. Patient reports snoring at night for the past 10 years. Worse in supine position. Occasionally wakes self snoring. Unsure if witnessed apnea, sleeps alone. Wakes up at night choking and gasping for air. No restorative sleep. No dry mouth upon awakening. Fatigue and tiredness during the day. No history of sleep apnea. Bedtime 10 pm and rise time is 7 am. It takes few minutes to fall asleep- plays on phone until gets sleepy then right to sleep. No TV in bedroom. 1 nocturia. Wakes up 1 times at night. It takes few minutes to fall back a sleep. Takes No nap during the day. Occasional headache in am. No car wrecks or near wrecks because of the sleepiness. No nodding off while driving. Gained 70 pounds in the past 9 years. No forgetfulness or decreased concentration. No nasal congestion at night. Drinks 2 caffinated beverages per day. No significant alcohol. No restless feelings in legs at night. No teeth grinding, maybe some clentching. No nightmares. No sleep walking. No night time panic attacks. No narcotics. No drug abuse. +history of depression. +history of anxiety. No history of atrial fibrillation. No history of DM. +history of HTN. No history of ischemic heart disease. No history of stroke. ESS is 1. No smoking. No FH for RLS. +FH for VANESSA- dad.   States son has narcolepsy. Blood pressure is elevated in office today. Denies CP, vision change or Headache. States she is working with PCP and cardiology    Sleep Medicine 3/1/2022 9/28/2021 3/1/2021 11/5/2020 10/15/2019 6/4/2019 5/22/2019   Sitting and reading 1 1 0 0 0 1 0   Watching TV 0 0 0 0 0 0 0   Sitting, inactive in a public place (e.g. a theatre or a meeting) 0 0 0 0 0 0 0   As a passenger in a car for an hour without a break 0 0 0 0 0 0 0   Lying down to rest in the afternoon when circumstances permit 2 2 2 0 3 3 1   Sitting and talking to someone 0 0 0 0 0 0 0   Sitting quietly after a lunch without alcohol 0 0 1 0 0 0 0   In a car, while stopped for a few minutes in traffic 0 0 0 0 0 0 0   Total score 3 3 3 0 3 4 1   Neck circumference (Inches) - - - - 15.75 15.75 15.75       Past Medical History:  Past Medical History:   Diagnosis Date    Acquired hypothyroidism 1/6/2016    Adjustment reaction with anxiety and depression 10/19/2015    Chronic diastolic congestive heart failure ([address] 9/30/2019    Depression     Essential hypertension 10/19/2015    Gastroesophageal reflux disease without esophagitis 10/19/2015    Hiatal hernia     Hypertension     Kidney failure     stage 3A    Obesity, Class II, BMI 35-39.9, with comorbidity 10/19/2015    Sleep apnea        Past Surgical History:        Procedure Laterality Date    COLONOSCOPY  2005    COLONOSCOPY      EGD COLONOSCOPY  1/9/2020    EGD ESOPHAGOGASTRODUODENOSCOPY DILATATION performed by Escobar Carrington MD at [address] [place] [address]  2000    due to [address] N/A 1/9/2020    EGD BIOPSY performed by Escobar Carrington MD at [facility]:  is allergic to sulfa antibiotics. Social History:    TOBACCO:   reports that she has never smoked. She has never used smokeless tobacco.  ETOH:   reports current alcohol use.     Family History:       Problem Relation Age of Onset    Heart Disease Mother         A. Fib    Alzheimer's Disease Mother     Arthritis Father     Hearing Loss Father     Heart Disease Father     Other Father         Alpha 1 antitrypson deficiency    [de-identified] / [place] Sister     Other Sister         MS and Alpha 1 antitrypson deficiency    Kidney Disease Brother         Kidney stone    Stroke Maternal Grandmother     Heart Disease Maternal Grandmother     Diabetes Paternal Grandfather     Diabetes Paternal Aunt     Other Paternal Uncle         Alpha 1 antitrypson deficiency    Early Death Maternal Grandfather 47    Other Paternal Grandmother         Alpha 1 antitrypson deficiency    Cirrhosis Paternal Grandmother         Non alcoholic    Asthma Neg Hx     Birth Defects Neg Hx     Cancer Neg Hx     Depression Neg Hx     High Blood Pressure Neg Hx     High Cholesterol Neg Hx     Learning Disabilities Neg Hx     Mental Illness Neg Hx     Mental Retardation Neg Hx     Substance Abuse Neg Hx        Current Medications:    Current Outpatient Medications:     Spacer/Aero-Holding Chambers NOHEMI, 1 Device by Does not apply route daily as needed (with inhaler), Disp: 1 each, Rfl: 0    losartan (COZAAR) 100 MG tablet, TAKE 1 TABLET BY MOUTH EVERY DAY, Disp: 90 tablet, Rfl: 3    metoprolol succinate (TOPROL XL) 25 MG extended release tablet, TAKE 1 TABLET BY MOUTH EVERY DAY, Disp: 90 tablet, Rfl: 3    furosemide (LASIX) 40 MG tablet, TAKE 1 TABLET BY MOUTH EVERY DAY, Disp: 90 tablet, Rfl: 3    doxazosin (CARDURA) 4 MG tablet, Take 1 tablet by mouth nightly, Disp: 90 tablet, Rfl: 3    Calcium-Magnesium-Zinc 333-133-5 MG TABS, Take by mouth, Disp: , Rfl:     b complex vitamins capsule, Take 1 capsule by mouth daily, Disp: , Rfl:     olopatadine (PATADAY) 0.2 % SOLN ophthalmic solution, 1 drop daily, Disp: , Rfl:     clobetasol (TEMOVATE) 0.05 % cream, Apply to affected area twice daily for up to 2 weeks or until improved. , Disp: 60 g, Rfl: 2    Omega-3 Fatty Acids (FISH OIL) 1000 MG CAPS, Take 1 capsule by mouth daily, Disp: 30 capsule, Rfl: 0    omeprazole (PRILOSEC OTC) 20 MG tablet, Take 1 tablet by mouth daily, Disp: 90 tablet, Rfl: 1    Biotin 3 MG TABS, Take 3 mg by mouth daily, Disp: , Rfl:     Cholecalciferol (VITAMIN D3 PO), Take 500 mg by mouth daily, Disp: , Rfl:     albuterol sulfate  (90 Base) MCG/ACT inhaler, Inhale 2 puffs into the lungs every 6 hours as needed for Wheezing or Shortness of Breath (Patient not taking: Reported on 3/1/2022), Disp: 18 g, Rfl: 0        Review of Systems        Objective:   PHYSICAL EXAM:  There were no vitals taken for this visit. Physical Exam  Exam:  Gen: No acute distress, does not appear to be in pain. Appears well developed and nourished. HENT: Head is normocephalic and atraumatic. Normal appearing nose. External Ears normal.   Neck: No visualized mass. Trachea is midline   Eyes: EOM intact. No visible discharge. Resp:No visualized signs of difficulty breathing or respiratory distress, speaking in full sentences. Respiratory effort normal.  Neuro: Awake. Alert. Able to follow commands. No facial asymmetry. Psych: Oriented x 3. No anxiety. Normal affect. DATA:   6/3/19 HST No significant sleep related breathing disorder, low SPO2 83%, under 89% for 39.4 minutes. Recommendations in lab PSG as HST could be falsely negative given degree of hypoxia  6/21/2019 PSG AHI 5.5/REM AHI 13.8, low SPO2 83%  8/5/2019 titration-controlled sleep-related breathing with CPAP, recommendation CPAP 8-12 cm H2O    CPAP compliance data:  Compliance download report from 9/14/19 to 10/13/19 reviewed today by me and showed patient is using machine 5:32 hrs/night with 73.3% compliance and AHI 4.2 within this time frame. 22/30days with greater than 4 hours of machine use.   90% pressure 9.5 cm H20 on auto CPAP 8-12 cm H2O    Compliance download report from 9/25/20 to 10/25/20 reviewed today by me and showed patient is using machine 7:27 hrs/night with 77.4% compliance and AHI 3.1 within this time frame. 24/30days with greater than 4 hours of machine use. 90% pressure 10.3 cm H20 on auto CPAP 9-13 cm H2O    Compliance download report from 1/25/21 to 2/23/21 reviewed today by me and showed patient is using machine 6:19 hrs/night with 80% compliance and AHI 2.3 within this time frame. 26/30days with greater than 4 hours of machine use. 90% pressure 10.5 cm H20 on 9-13 cm H2O    Compliance download report from 8/28/21 to 9/26/21 reviewed today by me and showed patient is using machine 6:32 hrs/night with 63% compliance and AHI 12 within this time frame. 19/30days with greater than 4 hours of machine use. 90% pressure 11 cm H20 on auto CPAP  9-13 cm H2O    CPAP download report from 10/9/2021-11/7/2021 on auto CPAP 10-14 cm H2O reviewed. Compliance is good 87%. AHI is improving however still elevated 8.9. CPAP download from 11/14/2021-12/13/2021 on auto CPAP 10-14 cm H2O reviewed. Compliance is good 90%. AHI has improved and is 5.0. Compliance download report from 1/29/22 to 2/27/22 reviewed today by me and showed patient is using machine 5:55 hrs/night with 90% compliance and AHI 8.1 within this time frame. 27/30days with greater than 4 hours of machine use. 90% pressure 12.5 cm H20 on auto CPAP 10-14 cm H2O    CPAP download report from 1/23/2022-2/21/2022 on auto CPAP 11-15 cm H2O reviewed. Compliance is good 83%. AHI has elevated 11.3. Assessment:       · Mild VANESSA. Auto CPAP 9-13 cm H2O. Optimal compliance on review today, residual AHI 8.1, suspect residual CSA. · Snoring-resolved on CPAP  · Fatigue-improving  · Obesity  · HTN  · Dry mouth with CPAP        Plan:      · Send order to change to auto CPAP 11cm H2O  · Follow AHI and adjust pressure if needed  · Discussed chinstrap or full facemask may help with dry mouth.   Patient declines at this time  · Advised to use CPAP 6-8 hrs at night and during naps. · Replacement of mask, tubing, head straps every 3-6 months or sooner if damaged. · Patient instructed to contact DME company for any mask, tubing or machine trouble shooting if problems arise. · Sleep hygiene  · Avoid sedatives, alcohol and caffeinated drinks at bed time. · No driving motorized vehicles or operating heavy machinery while fatigue, drowsy or sleepy. · Weight loss is also recommended as a long-term intervention. · Complications of VANESSA if not treated were discussed with patient patient to include systemic hypertension, pulmonary hypertension, cardiovascular morbidities, car accidents and all cause mortality. · Discussed pathophysiology of VANESSA/CSA  Patient education  provided regarding sleep tips and CPAP cleaning recommendations    -Discussed [employer] recently recalled some Pap units. Patient encouraged to call DME/ to see if her unit is on recall and register it if so. Discussed risks/benefits of untreated sleep apnea as well as risks/benefits of use of unit if recalled. At this time, if patients have moderate to severe sleep apnea or have severe daytime sleepiness, it is recommended continue therapy until the machine can be replaced. Discussed patient's risk based on increased daytime sleepiness without CPAP, hypoxia on sleep studies, comorbid cardiac conditions. Based upon the information we have so far, it appears the risk of stopping therapy may be higher than the risks associated with foam degradation. However, there are still many unknown variables and each patient will have to make a final determination on his or her own. Patient states she plans to continue current CPAP. Please only use cleaning methods recommended by . Follow-up: 3 months, sooner if needed    Ellen Alvarenga, was evaluated through a synchronous (real-time) audio-video encounter.  The patient (or guardian if applicable) is aware that this is a billable service, which includes applicable co-pays. This Virtual Visit was conducted with patient's (and/or legal guardian's) consent. The visit was conducted pursuant to the emergency declaration under the [address] [place] [address], [address] [place] [address] authority and the [employer] General Act. Patient identification was verified, and a caregiver was present when appropriate. The patient was located at home in a state where the provider was licensed to provide care. Total time spent for this encounter: Not billed by time    --DEE Salmon CNP on 3/1/2022 at 10:53 AM    An electronic signature was used to authenticate this note.               DEE Salmon CNP

## 2022-03-01 NOTE — TELEPHONE ENCOUNTER
Within this Telehealth Consent, the terms you and yours refer to the person using the Telehealth Service (Service), or in the case of a use of the Service by or on behalf of a minor, you and yours refer to and include (i) the parent or legal guardian who provides consent to the use of the Service by such minor or uses the Service on behalf of such minor, and (ii) the minor for whom consent is being provided or on whose behalf the Service is being utilized. When using Service, you will be consulting with your health care providers via the use of Telehealth.   Telehealth involves the delivery of healthcare services using electronic communications, information technology or other means between a healthcare provider and a patient who are not in the same physical location. Telehealth may be used for diagnosis, treatment, follow-up and/or patient education, and may include, but is not limited to, one or more of the following:    Electronic transmission of medical records, photo images, personal health information or other data between a patient and a healthcare provider    Interactions between a patient and healthcare provider via audio, video and/or data communications    Use of output data from medical devices, sound and video files    Anticipated Benefits   The use of Telehealth by your Provider(s) through the Service may have the following possible benefits:    Making it easier and more efficient for you to access medical care and treatment for the conditions treated by such Provider(s) utilizing the Service    Allowing you to obtain medical care and treatment by Provider(s) at times that are convenient for you    Enabling you to interact with Provider(s) without the necessity of an in-office appointment     Possible Risks   While the use of Telehealth can provide potential benefits for you, there are also potential risks associated with the use of Telehealth.  These risks include, but may not be the provision of medical care and treatment via Telehealth and the Service and you may not be able to receive diagnosis and/or treatment through the Service for every condition for which you seek diagnosis and/or treatment. 11. There are potential risks to the use of Telehealth, including but not limited to the risks described in this Telehealth Consent. 12. Your Provider(s) have discussed the use of Telehealth and the Service with you, including the benefits and risks of such and you have provided oral consent to your Provider(s) for the use of Telehealth and the Service. 15. You understand that it is your duty to provide your Provider(s) truthful, accurate and complete information, including all relevant information regarding care that you may have received or may be receiving from other healthcare providers outside of the Service. 14. You understand that each of your Provider(s) may determine in his or sole discretion that your condition is not suitable for diagnosis and/or treatment using the Service, and that you may need to seek medical care and treatment a specialist or other healthcare provider, outside of the Service. 15. You understand that you are fully responsible for payment for all services provided by Provider(s) or through use of the Service and that you may not be able to use third-party insurance. 16. You represent that (a) you have read this Telehealth Consent carefully, (b) you understand the risks and benefits of the Service and the use of Telehealth in the medical care and treatment provided to you by Provider(s) using the Service, and (c) you have the legal capacity and authority to provide this consent for yourself and/or the minor for which you are consenting under applicable federal and state laws, including laws relating to the age of [de-identified] and/or parental/guardian consent.    17. You give your informed consent to the use of Telehealth by Provider(s) using the Service under the terms described in the Terms of Service and this Telehealth Consent. The patient was read the following statement and has consented to the visit as of 3/1/22. The patient has been scheduled for their first telehealth visit on 03/01/22 with Willy Hernandez

## 2022-03-30 NOTE — TELEPHONE ENCOUNTER
Looks like report scanned has time from 2 different pressure settings.   Please get report from date of pressure change to current

## 2022-04-06 NOTE — TELEPHONE ENCOUNTER
CPAP download report from 3/4/2022-4/2/2022 on CPAP 11 cm H2O reviewed. Compliance is good 87%. AHI is good 1.8.

## 2022-05-31 RX ORDER — FUROSEMIDE 40 MG/1
TABLET ORAL
Qty: 90 TABLET | Refills: 3 | OUTPATIENT
Start: 2022-05-31

## 2022-06-23 ENCOUNTER — OFFICE VISIT (OUTPATIENT)
Dept: FAMILY MEDICINE CLINIC | Age: 67
End: 2022-06-23
Payer: MEDICARE

## 2022-06-23 VITALS — DIASTOLIC BLOOD PRESSURE: 80 MMHG | BODY MASS INDEX: 35.99 KG/M2 | WEIGHT: 229.8 LBS | SYSTOLIC BLOOD PRESSURE: 108 MMHG

## 2022-06-23 DIAGNOSIS — Z00.00 INITIAL MEDICARE ANNUAL WELLNESS VISIT: Primary | ICD-10-CM

## 2022-06-23 DIAGNOSIS — Z23 NEED FOR SHINGLES VACCINE: ICD-10-CM

## 2022-06-23 DIAGNOSIS — I50.32 CHRONIC DIASTOLIC CONGESTIVE HEART FAILURE (HCC): ICD-10-CM

## 2022-06-23 DIAGNOSIS — I10 ESSENTIAL HYPERTENSION: ICD-10-CM

## 2022-06-23 DIAGNOSIS — N18.31 STAGE 3A CHRONIC KIDNEY DISEASE (HCC): ICD-10-CM

## 2022-06-23 DIAGNOSIS — Z23 NEED FOR TDAP VACCINATION: ICD-10-CM

## 2022-06-23 DIAGNOSIS — R73.03 PREDIABETES: ICD-10-CM

## 2022-06-23 LAB — HBA1C MFR BLD: 6.2 %

## 2022-06-23 PROCEDURE — G0438 PPPS, INITIAL VISIT: HCPCS | Performed by: NURSE PRACTITIONER

## 2022-06-23 PROCEDURE — 3017F COLORECTAL CA SCREEN DOC REV: CPT | Performed by: NURSE PRACTITIONER

## 2022-06-23 PROCEDURE — 1123F ACP DISCUSS/DSCN MKR DOCD: CPT | Performed by: NURSE PRACTITIONER

## 2022-06-23 PROCEDURE — 83036 HEMOGLOBIN GLYCOSYLATED A1C: CPT | Performed by: NURSE PRACTITIONER

## 2022-06-23 RX ORDER — FUROSEMIDE 40 MG/1
TABLET ORAL
Qty: 90 TABLET | Refills: 3 | Status: CANCELLED | OUTPATIENT
Start: 2022-06-23

## 2022-06-23 RX ORDER — FUROSEMIDE 40 MG/1
TABLET ORAL
Qty: 90 TABLET | Refills: 3 | Status: SHIPPED | OUTPATIENT
Start: 2022-06-23

## 2022-06-23 ASSESSMENT — LIFESTYLE VARIABLES
HOW MANY STANDARD DRINKS CONTAINING ALCOHOL DO YOU HAVE ON A TYPICAL DAY: 1 OR 2
HOW OFTEN DO YOU HAVE A DRINK CONTAINING ALCOHOL: 2-4 TIMES A MONTH

## 2022-06-23 ASSESSMENT — PATIENT HEALTH QUESTIONNAIRE - PHQ9
2. FEELING DOWN, DEPRESSED OR HOPELESS: 0
1. LITTLE INTEREST OR PLEASURE IN DOING THINGS: 0
SUM OF ALL RESPONSES TO PHQ9 QUESTIONS 1 & 2: 0
SUM OF ALL RESPONSES TO PHQ QUESTIONS 1-9: 0

## 2022-06-23 NOTE — PATIENT INSTRUCTIONS
Personalized Preventive Plan for [facility] - 6/23/2022  Medicare offers a range of preventive health benefits. Some of the tests and screenings are paid in full while other may be subject to a deductible, co-insurance, and/or copay. Some of these benefits include a comprehensive review of your medical history including lifestyle, illnesses that may run in your family, and various assessments and screenings as appropriate. After reviewing your medical record and screening and assessments performed today your provider may have ordered immunizations, labs, imaging, and/or referrals for you. A list of these orders (if applicable) as well as your Preventive Care list are included within your After Visit Summary for your review. Other Preventive Recommendations:    · A preventive eye exam performed by an eye specialist is recommended every 1-2 years to screen for glaucoma; cataracts, macular degeneration, and other eye disorders. · A preventive dental visit is recommended every 6 months. · Try to get at least 150 minutes of exercise per week or 10,000 steps per day on a pedometer . · Order or download the FREE \"Exercise & Physical Activity: Your Everyday Guide\" from [employer]. Call 4-510.658.7145 or search [employer] online. · You need 4375-8097 mg of calcium and 8698-2594 IU of vitamin D per day. It is possible to meet your calcium requirement with diet alone, but a vitamin D supplement is usually necessary to meet this goal.  · When exposed to the sun, use a sunscreen that protects against both UVA and UVB radiation with an SPF of 30 or greater. Reapply every 2 to 3 hours or after sweating, drying off with a towel, or swimming. · Always wear a seat belt when traveling in a car. Always wear a helmet when riding a bicycle or motorcycle.

## 2022-06-23 NOTE — PROGRESS NOTES
Medicare Annual Wellness Visit    Amy Greenberg is here for Medicare AWV and Shortness of Breath (has CHF, worse with activity)    Assessment & Plan      Initial Medicare annual wellness visit    Need for shingles vaccine  Pt will go downstairs to our pharmacy for her shingles vaccine. Essential hypertension  Managed by cardiology and nephrology. Pt states that she was feeling dizzy, had no energy and HR was low so she stopped taking her metoprolol 2-3 weeks ago. Has been monitoring her BP at home and it has been between 108-130/60-80. She has not notified her cardiologist or nephrologist yet, but plans to do so. She states that she has not seen her cardiologist since 11/2020 and needs a refill on her Lasix. -     furosemide (LASIX) 40 MG tablet; TAKE 1 TABLET BY MOUTH EVERY DAY, Disp-90 tablet, R-3Normal    Need for Tdap vaccination  Pt will go downstairs to our pharmacy for her Tdap vaccine. Prediabetes  A1C today is 6.2. Encouraged healthy diet and exercise. Will continue to monitor.      -     POCT glycosylated hemoglobin (Hb A1C)    Stage 3a chronic kidney disease ([facility] [address]  Managed by nephrology. Has an f/u appt with them next month. Pt is aware to avoid NSAIDs, and follows a low salt diet. Chronic diastolic congestive heart failure ([facility] [address]  See above. Pt encouraged to f/u with Cardiology as advised. Takes Lasix as directed for CHF and LE edema. -     furosemide (LASIX) 40 MG tablet; TAKE 1 TABLET BY MOUTH EVERY DAY, Disp-90 tablet, R-3Normal      Recommendations for Preventive Services Due: see orders and patient instructions/AVS.  Recommended screening schedule for the next 5-10 years is provided to the patient in written form: see Patient Instructions/AVS.     Return for Medicare Annual Wellness Visit in 1 year.      Subjective   The following acute and/or chronic problems were also addressed today:    Patient's complete Health Risk Assessment and screening values have been reviewed and are found in [facility]. The following problems were reviewed today and where indicated follow up appointments were made and/or referrals ordered. Positive Risk Factor Screenings with Interventions:               General Health and ACP:  General  In general, how would you say your health is?: Good  In the past 7 days, have you experienced any of the following: New or Increased Pain, New or Increased Fatigue, Loneliness, Social Isolation, Stress or Anger?: No  Do you get the social and emotional support that you need?: Yes  Do you have a Living Will?: Yes    Advance Directives     Power of  Living Will ACP-Advance Directive ACP-Power of     Not on File Not on File Not on File Not on File      General Health Risk Interventions:  · N/A    Health Habits/Nutrition:     Physical Activity: Inactive    Days of Exercise per Week: 0 days    Minutes of Exercise per Session: 0 min     Have you lost any weight without trying in the past 3 months?: No     Have you seen the dentist within the past year?: Yes    Health Habits/Nutrition Interventions:  · Inadequate physical activity:  educational materials provided to promote increased physical activity             Objective   Vitals:    06/23/22 0929   BP: 108/80   Site: Left Upper Arm   Position: Sitting   Cuff Size: Large Adult   Weight: 229 lb 12.8 oz (104.2 kg)      Body mass index is 35.99 kg/m².         General Appearance: alert and oriented to person, place and time, well-developed and well-nourished, in no acute distress  Skin: warm and dry, no rash or erythema  Pulmonary/Chest: clear to auscultation bilaterally- no wheezes, rales or rhonchi, normal air movement, no respiratory distress  Cardiovascular: normal rate, normal S1 and S2, no gallops, intact distal pulses and no carotid bruits  Neurologic: gait and coordination normal and speech normal       Allergies   Allergen Reactions    Sulfa Antibiotics Hives     Prior to Visit Medications Medication Sig Taking? Authorizing Provider   furosemide (LASIX) 40 MG tablet TAKE 1 TABLET BY MOUTH EVERY DAY Yes DEE Villaseñor CNP   losartan (COZAAR) 100 MG tablet TAKE 1 TABLET BY MOUTH EVERY DAY Yes Luwana Favre, MD   doxazosin (CARDURA) 4 MG tablet Take 1 tablet by mouth nightly Yes Luwana Favre, MD   Calcium-Magnesium-Zinc 517-796-9 MG TABS Take by mouth Yes Historical Provider, MD   b complex vitamins capsule Take 1 capsule by mouth daily Yes Historical Provider, MD   olopatadine (PATADAY) 0.2 % SOLN ophthalmic solution 1 drop daily Yes Historical Provider, MD   clobetasol (TEMOVATE) 0.05 % cream Apply to affected area twice daily for up to 2 weeks or until improved.  Yes Dony Esteves MD   Omega-3 Fatty Acids (FISH OIL) 1000 MG CAPS Take 1 capsule by mouth daily Yes DEE Ragland CNP   omeprazole (PRILOSEC OTC) 20 MG tablet Take 1 tablet by mouth daily Yes Michelle Louise MD   Biotin 3 MG TABS Take 3 mg by mouth daily Yes Historical Provider, MD   Cholecalciferol (VITAMIN D3 PO) Take 500 mg by mouth daily Yes Historical Provider, MD   Spacer/Aero-Holding Sophie Poll 1 Device by Does not apply route daily as needed (with inhaler)  DEE Villaseñor CNP   metoprolol succinate (TOPROL XL) 25 MG extended release tablet TAKE 1 TABLET BY MOUTH EVERY DAY  Patient not taking: Reported on 6/23/2022  Luwana Favre, MD       [facility] (Including outside providers/suppliers regularly involved in providing care):   Patient Care Team:  DEE Villaseñor CNP as PCP - General (Nurse Practitioner)  DEE Villaseñor CNP as PCP - [facility] Empaneled Provider  Dony Esteves MD as Consulting Physician (Dermatology)  Luwana Favre, MD as Consulting Physician (Cardiology)     Reviewed and updated this visit:  Allergies  Meds  Problems

## 2022-07-14 RX ORDER — DOXAZOSIN MESYLATE 4 MG/1
TABLET ORAL
Qty: 90 TABLET | Refills: 3 | OUTPATIENT
Start: 2022-07-14

## 2022-07-14 NOTE — TELEPHONE ENCOUNTER
11/16/2020 M      5/29/2022 per JOSE Ellis-Spoke with patient. She prefers to follow with pcp and she will have her fill it.

## 2022-07-18 ENCOUNTER — HOSPITAL ENCOUNTER (OUTPATIENT)
Age: 67
Discharge: HOME | End: 2022-07-18
Payer: MEDICARE

## 2022-07-18 DIAGNOSIS — N18.31 STAGE 3A CHRONIC KIDNEY DISEASE (HCC): ICD-10-CM

## 2022-07-18 PROCEDURE — 80048 BASIC METABOLIC PNL TOTAL CA: CPT

## 2022-07-18 PROCEDURE — 36415 COLL VENOUS BLD VENIPUNCTURE: CPT

## 2022-07-19 LAB
ANION GAP SERPL CALCULATED.3IONS-SCNC: 11 MMOL/L (ref 3–16)
BUN BLDV-MCNC: 20 MG/DL (ref 7–20)
CALCIUM SERPL-MCNC: 8.9 MG/DL (ref 8.3–10.6)
CHLORIDE BLD-SCNC: 105 MMOL/L (ref 99–110)
CO2: 26 MMOL/L (ref 21–32)
CREAT SERPL-MCNC: 1 MG/DL (ref 0.6–1.2)
GFR AFRICAN AMERICAN: >60
GFR NON-AFRICAN AMERICAN: 55
GLUCOSE BLD-MCNC: 180 MG/DL (ref 70–99)
POTASSIUM SERPL-SCNC: 3.7 MMOL/L (ref 3.5–5.1)
SODIUM BLD-SCNC: 142 MMOL/L (ref 136–145)

## 2022-07-28 ENCOUNTER — TELEPHONE (OUTPATIENT)
Dept: FAMILY MEDICINE CLINIC | Age: 67
End: 2022-07-28

## 2022-07-28 NOTE — TELEPHONE ENCOUNTER
Patient called asking if you will take over refilling her Losartan and Doxazosin? ? Stated her heart Doctor had told her to ask her PCP. Please advise.

## 2022-07-28 NOTE — TELEPHONE ENCOUNTER
Elizabeth Velez is not in the office today but that should be fine. Those are noncontrolled medicines. Please have her pharmacy fax us refill requests when she needs refills.

## 2022-07-29 RX ORDER — DOXAZOSIN MESYLATE 4 MG/1
TABLET ORAL
Qty: 90 TABLET | Refills: 3 | OUTPATIENT
Start: 2022-07-29

## 2022-07-29 RX ORDER — LOSARTAN POTASSIUM 100 MG/1
TABLET ORAL
Qty: 90 TABLET | Refills: 3 | OUTPATIENT
Start: 2022-07-29

## 2022-07-29 NOTE — TELEPHONE ENCOUNTER
Refill Request     CONFIRM preferrred pharmacy with the patient. If Mail Order Rx - Pend for 90 day refill.       Last Seen: Last Seen Department: 6/23/2022  Last Seen by PCP: 6/23/2022    Last Written: 12/2/2021 90 with 3     Next Appointment:   Future Appointments   Date Time Provider Luiza Hidalgo   9/2/2022 11:00 AM DEE Laughlin CNP [facility]   1/13/2023  1:00 PM Kisha Dumas MD [facility]   6/27/2023 10:00 AM DEE Henderson CNP [facility] - [facility]       Future appointment scheduled      Requested Prescriptions     Pending Prescriptions Disp Refills    losartan (COZAAR) 100 MG tablet 90 tablet 3     Sig: TAKE 1 TABLET BY MOUTH EVERY DAY

## 2022-07-29 NOTE — TELEPHONE ENCOUNTER
Plan:  at [address] 11/16/2020  1. Meds reviewed. Refills as warranted    2. Have BP machine checked for accuracy. Goal /85 or less. 3. Ok to take extra lasix pill as needed for a few days for weight gain 2-3 lbs or increased swelling. 4. Follow up with me as needed.   per Dr. Eren Sullivan is okay with PCP filling script since she does not need to follow with cardiologist please

## 2022-08-01 RX ORDER — LOSARTAN POTASSIUM 100 MG/1
TABLET ORAL
Qty: 90 TABLET | Refills: 1 | Status: SHIPPED | OUTPATIENT
Start: 2022-08-01

## 2022-08-01 RX ORDER — DOXAZOSIN MESYLATE 4 MG/1
4 TABLET ORAL NIGHTLY
Qty: 90 TABLET | Refills: 1 | Status: SHIPPED | OUTPATIENT
Start: 2022-08-01

## 2022-08-01 NOTE — TELEPHONE ENCOUNTER
Per patient, Marvelyn Gaucher was going to fill her medications for her. Her cardiologists said he didn't need to see her anymore but she would still need the medications. She states she already took over the lasix for her as well.

## 2022-09-02 ENCOUNTER — TELEMEDICINE (OUTPATIENT)
Dept: PULMONOLOGY | Age: 67
End: 2022-09-02
Payer: MEDICARE

## 2022-09-02 DIAGNOSIS — G47.33 MILD OBSTRUCTIVE SLEEP APNEA: Primary | ICD-10-CM

## 2022-09-02 DIAGNOSIS — E66.9 OBESITY (BMI 30-39.9): ICD-10-CM

## 2022-09-02 DIAGNOSIS — Z71.89 CPAP USE COUNSELING: ICD-10-CM

## 2022-09-02 DIAGNOSIS — Z72.821 POOR SLEEP HYGIENE: ICD-10-CM

## 2022-09-02 PROCEDURE — 1123F ACP DISCUSS/DSCN MKR DOCD: CPT | Performed by: NURSE PRACTITIONER

## 2022-09-02 PROCEDURE — 99213 OFFICE O/P EST LOW 20 MIN: CPT | Performed by: NURSE PRACTITIONER

## 2022-09-02 PROCEDURE — G8427 DOCREV CUR MEDS BY ELIG CLIN: HCPCS | Performed by: NURSE PRACTITIONER

## 2022-09-02 PROCEDURE — 3017F COLORECTAL CA SCREEN DOC REV: CPT | Performed by: NURSE PRACTITIONER

## 2022-09-02 PROCEDURE — 1090F PRES/ABSN URINE INCON ASSESS: CPT | Performed by: NURSE PRACTITIONER

## 2022-09-02 PROCEDURE — G8399 PT W/DXA RESULTS DOCUMENT: HCPCS | Performed by: NURSE PRACTITIONER

## 2022-09-02 ASSESSMENT — SLEEP AND FATIGUE QUESTIONNAIRES
HOW LIKELY ARE YOU TO NOD OFF OR FALL ASLEEP WHILE SITTING AND READING: 1
ESS TOTAL SCORE: 5
HOW LIKELY ARE YOU TO NOD OFF OR FALL ASLEEP WHILE SITTING QUIETLY AFTER LUNCH WITHOUT ALCOHOL: 0
HOW LIKELY ARE YOU TO NOD OFF OR FALL ASLEEP WHILE SITTING INACTIVE IN A PUBLIC PLACE: 0
HOW LIKELY ARE YOU TO NOD OFF OR FALL ASLEEP WHILE LYING DOWN TO REST IN THE AFTERNOON WHEN CIRCUMSTANCES PERMIT: 2
HOW LIKELY ARE YOU TO NOD OFF OR FALL ASLEEP WHEN YOU ARE A PASSENGER IN A CAR FOR AN HOUR WITHOUT A BREAK: 1
HOW LIKELY ARE YOU TO NOD OFF OR FALL ASLEEP WHILE WATCHING TV: 1
HOW LIKELY ARE YOU TO NOD OFF OR FALL ASLEEP WHILE SITTING AND TALKING TO SOMEONE: 0
HOW LIKELY ARE YOU TO NOD OFF OR FALL ASLEEP IN A CAR, WHILE STOPPED FOR A FEW MINUTES IN TRAFFIC: 0

## 2022-09-02 NOTE — PROGRESS NOTES
Patient ID: Chico Ramirez is a 79 y.o. female who is being seen today for   Chief Complaint   Patient presents with    Follow-up     6mo sleep cr scanned      Referring: Dr. Romina Chaparro    HPI:     Chico Ramirez is a 79 y.o. female for televideo appointment via video and audio doxy. me virtual visit for VANESSA follow up. States she has not received replacement CPAP from the  for the recall yet. States sometimes does not go to her bed.  has an ill dog. STates also had COVID recently and could not use CPAP for awhile. Patient is using CPAP   4 hrs/night. Using humidifier. No snoring on CPAP. The pressure is well tolerated. The mask is comfortable- nasal mask. No mask leak. No significant daytime sleepiness. No nodding off when driving. +Dry mouth. Some fatigue. Bedtime is 2 am and rise time is 6-10 am. Sleep onset is few minutes -when put phone down right to sleep. Wakes up 0 times at night total. No nocturia. Occasional naps during the day. She denies trouble sleeping, states her sleep pattern is poor however that is how she chooses to sleep. No headache in am. No weight gain. 0-1 caffienated beverages during the day. Rare alcohol. ESS is 5        Previous 3/1/22  Chico Ramirez is a 79 y.o. female for televideo appointment via video and audio doxy. me virtual visit for VANESSA follow up. States she is doing okay with CPAP. Patient is using CPAP 5-6 hrs/night. Using humidifier. No snoring on CPAP. The pressure is well tolerated. The mask is comfortable- nasal. No mask leak. No significant daytime sleepiness. No nodding off when driving. +dry mouth- declines full face or chin strap. No fatigue. Bedtime is MN  and rise time is 9 am. Sleep onset is few minutes. Wakes up 1 times at night total. 1 nocturia. It takes usually few minutes to fall back a sleep. Rare naps during the day. No headache in am. No weight gain. 0-1 caffienated beverages during the day. Rare alcohol. ESS is 3.       Previous 9/28/21  Jane Epstein is a 79 y.o. female for televideo appointment via video and audio doxy. me virtual visit for VANESSA follow up. Patient is using CPAP 6-8 hrs/night. She did forget a piece of her mask when she went on vacation recently and was unable to use for about a week. She was more tired when not using CPAP with increased morning headaches and napping. Using humidifier. No snoring on CPAP. The pressure is well tolerated. The mask is somewhat comfortable- dreamwear nasal . States she might want to try brigitte loops. No mask leak. No significant daytime sleepiness. No nodding off when driving. No dry nose or throat. No fatigue. Bedtime is MN and rise time is 9 am. Sleep onset is few minutes. Wakes up 0-1 times at night total. 0-1 nocturia. It takes few minutes to fall back a sleep. few naps during the day. +headache in am since not using CPAP. No weight gain. 0-1 caffienated beverages during the day. Occasional alcohol. ESS is 3        Previous HPI 6/4/19  Jane Epstein is a 79 y.o. female in office for VANESSA follow up. HST was reviewed by me and noted below. Results were dicussed with patient and multiple good questions were answered. It showed no significant sleep related breathing disorder however low SPO2 83% in under 89% for 39.4 minutes     Bedtime is MN and rise time is 7 am Sleep onset is few minutes. Wakes up 0-3 times at night total. 0-3 nocturia. It takes few minutes to fall back a sleep. No naps during the day. Occasional headache in am. No weight gain. 2 caffienated beverages during the day. No alcohol. ESS is 4      Initial HPI 5/22/19  Jane Epstein is a 79 y.o. female in office for sleep apnea evaluation. Patient reports snoring at night for the past 10 years. Worse in supine position. Occasionally wakes self snoring. Unsure if witnessed apnea, sleeps alone. Wakes up at night choking and gasping for air. No restorative sleep. No dry mouth upon awakening.  Fatigue and tiredness during the day. No history of sleep apnea. Bedtime 10 pm and rise time is 7 am. It takes few minutes to fall asleep- plays on phone until gets sleepy then right to sleep. No TV in bedroom. 1 nocturia. Wakes up 1 times at night. It takes few minutes to fall back a sleep. Takes No nap during the day. Occasional headache in am. No car wrecks or near wrecks because of the sleepiness. No nodding off while driving. Gained 70 pounds in the past 9 years. No forgetfulness or decreased concentration. No nasal congestion at night. Drinks 2 caffinated beverages per day. No significant alcohol. No restless feelings in legs at night. No teeth grinding, maybe some clentching. No nightmares. No sleep walking. No night time panic attacks. No narcotics. No drug abuse. +history of depression. +history of anxiety. No history of atrial fibrillation. No history of DM. +history of HTN. No history of ischemic heart disease. No history of stroke. ESS is 1. No smoking. No FH for RLS. +FH for VANESSA- dad. States son has narcolepsy. Blood pressure is elevated in office today. Denies CP, vision change or Headache.    States she is working with PCP and cardiology    Sleep Medicine 9/2/2022 3/1/2022 9/28/2021 3/1/2021 11/5/2020 10/15/2019 6/4/2019   Sitting and reading 1 1 1 0 0 0 1   Watching TV 1 0 0 0 0 0 0   Sitting, inactive in a public place (e.g. a theatre or a meeting) 0 0 0 0 0 0 0   As a passenger in a car for an hour without a break 1 0 0 0 0 0 0   Lying down to rest in the afternoon when circumstances permit 2 2 2 2 0 3 3   Sitting and talking to someone 0 0 0 0 0 0 0   Sitting quietly after a lunch without alcohol 0 0 0 1 0 0 0   In a car, while stopped for a few minutes in traffic 0 0 0 0 0 0 0   Marion Sleepiness Score 5 3 3 3 0 3 4   Neck circumference (Inches) - - - - - 15.75 15.75       Past Medical History:  Past Medical History:   Diagnosis Date    Acquired hypothyroidism 1/6/2016    Adjustment reaction with anxiety and depression 10/19/2015    Chronic diastolic congestive heart failure ([facility] [address] 9/30/2019    Depression     Essential hypertension 10/19/2015    Gastroesophageal reflux disease without esophagitis 10/19/2015    Hiatal hernia     Hypertension     Kidney failure     stage 3A    Obesity, Class II, BMI 35-39.9, with comorbidity 10/19/2015    Sleep apnea        Past Surgical History:        Procedure Laterality Date    COLONOSCOPY  2005    COLONOSCOPY      EGD COLONOSCOPY  1/9/2020    EGD ESOPHAGOGASTRODUODENOSCOPY DILATATION performed by Brooklyn Madsen MD at [address] [place] [address] ([address] [place] [address])      PARTIAL HYSTERECTOMY (CERVIX NOT REMOVED)  2000    due to Fibroid    UPPER GASTROINTESTINAL ENDOSCOPY N/A 1/9/2020    EGD BIOPSY performed by Brooklyn Madsen MD at [facility]:  is allergic to sulfa antibiotics. Social History:    TOBACCO:   reports that she has never smoked. She has never used smokeless tobacco.  ETOH:   reports current alcohol use. Family History:       Problem Relation Age of Onset    Heart Disease Mother         A.  Fib    Alzheimer's Disease Mother     Arthritis Father     Hearing Loss Father     Heart Disease Father     Other Father         Alpha 1 antitrypson deficiency    Koleen Junes / [place] Sister     Other Sister         MS and Alpha 1 antitrypson deficiency    Kidney Disease Brother         Kidney stone    Stroke Maternal Grandmother     Heart Disease Maternal Grandmother     Diabetes Paternal Grandfather     Diabetes Paternal Aunt     Other Paternal Uncle         Alpha 1 antitrypson deficiency    Early Death Maternal Grandfather 47    Other Paternal Grandmother         Alpha 1 antitrypson deficiency    Cirrhosis Paternal Grandmother         Non alcoholic    Asthma Neg Hx     Birth Defects Neg Hx     Cancer Neg Hx     Depression Neg Hx     High Blood Pressure Neg Hx     High Cholesterol Neg Hx     Learning Disabilities Neg Hx     Mental Illness Neg Hx Mental Retardation Neg Hx     Substance Abuse Neg Hx        Current Medications:    Current Outpatient Medications:     doxazosin (CARDURA) 4 MG tablet, Take 1 tablet by mouth nightly, Disp: 90 tablet, Rfl: 1    losartan (COZAAR) 100 MG tablet, TAKE 1 TABLET BY MOUTH EVERY DAY, Disp: 90 tablet, Rfl: 1    furosemide (LASIX) 40 MG tablet, TAKE 1 TABLET BY MOUTH EVERY DAY, Disp: 90 tablet, Rfl: 3    Calcium-Magnesium-Zinc 333-133-5 MG TABS, Take by mouth, Disp: , Rfl:     b complex vitamins capsule, Take 1 capsule by mouth daily, Disp: , Rfl:     olopatadine (PATADAY) 0.2 % SOLN ophthalmic solution, 1 drop daily, Disp: , Rfl:     clobetasol (TEMOVATE) 0.05 % cream, Apply to affected area twice daily for up to 2 weeks or until improved. , Disp: 60 g, Rfl: 2    Omega-3 Fatty Acids (FISH OIL) 1000 MG CAPS, Take 1 capsule by mouth daily, Disp: 30 capsule, Rfl: 0    omeprazole (PRILOSEC OTC) 20 MG tablet, Take 1 tablet by mouth daily, Disp: 90 tablet, Rfl: 1    Biotin 3 MG TABS, Take 3 mg by mouth daily, Disp: , Rfl:     Cholecalciferol (VITAMIN D3 PO), Take 500 mg by mouth daily, Disp: , Rfl:     Spacer/Aero-Holding Chambers NOHEMI, 1 Device by Does not apply route daily as needed (with inhaler), Disp: 1 each, Rfl: 0    metoprolol succinate (TOPROL XL) 25 MG extended release tablet, TAKE 1 TABLET BY MOUTH EVERY DAY (Patient not taking: Reported on 6/23/2022), Disp: 90 tablet, Rfl: 3        Review of Systems        Objective:   PHYSICAL EXAM:  There were no vitals taken for this visit. Physical Exam  Exam:  Gen: No acute distress, does not appear to be in pain. Appears well developed and nourished. HENT: Head is normocephalic and atraumatic. Normal appearing nose. External Ears normal.   Neck: No visualized mass. Trachea is midline   Eyes: EOM intact. No visible discharge. Resp:No visualized signs of difficulty breathing or respiratory distress, speaking in full sentences. Respiratory effort normal.  Neuro: Awake. Alert.  Able to follow commands. No facial asymmetry. Psych: Oriented x 3. No anxiety. Normal affect. DATA:   6/3/19 HST No significant sleep related breathing disorder, low SPO2 83%, under 89% for 39.4 minutes. Recommendations in lab PSG as HST could be falsely negative given degree of hypoxia  6/21/2019 PSG AHI 5.5/REM AHI 13.8, low SPO2 83%  8/5/2019 titration-controlled sleep-related breathing with CPAP, recommendation CPAP 8-12 cm H2O    CPAP compliance data:  Compliance download report from 9/14/19 to 10/13/19 reviewed today by me and showed patient is using machine 5:32 hrs/night with 73.3% compliance and AHI 4.2 within this time frame. 22/30days with greater than 4 hours of machine use. 90% pressure 9.5 cm H20 on auto CPAP 8-12 cm H2O    Compliance download report from 9/25/20 to 10/25/20 reviewed today by me and showed patient is using machine 7:27 hrs/night with 77.4% compliance and AHI 3.1 within this time frame. 24/30days with greater than 4 hours of machine use. 90% pressure 10.3 cm H20 on auto CPAP 9-13 cm H2O    Compliance download report from 1/25/21 to 2/23/21 reviewed today by me and showed patient is using machine 6:19 hrs/night with 80% compliance and AHI 2.3 within this time frame. 26/30days with greater than 4 hours of machine use. 90% pressure 10.5 cm H20 on 9-13 cm H2O    Compliance download report from 8/28/21 to 9/26/21 reviewed today by me and showed patient is using machine 6:32 hrs/night with 63% compliance and AHI 12 within this time frame. 19/30days with greater than 4 hours of machine use. 90% pressure 11 cm H20 on auto CPAP  9-13 cm H2O    CPAP download report from 10/9/2021-11/7/2021 on auto CPAP 10-14 cm H2O reviewed. Compliance is good 87%. AHI is improving however still elevated 8.9. CPAP download from 11/14/2021-12/13/2021 on auto CPAP 10-14 cm H2O reviewed. Compliance is good 90%. AHI has improved and is 5.0.     Compliance download report from 1/29/22 to 2/27/22 reviewed today by me and showed patient is using machine 5:55 hrs/night with 90% compliance and AHI 8.1 within this time frame. 27/30days with greater than 4 hours of machine use. 90% pressure 12.5 cm H20 on auto CPAP 10-14 cm H2O    CPAP download report from 1/23/2022-2/21/2022 on auto CPAP 11-15 cm H2O reviewed. Compliance is good 83%. AHI has elevated 11.3. Compliance download report from 7/26/22 to 8/24/22 reviewed today by me and showed patient is using machine 5:01 hrs/night with 40% compliance and AHI 2.4 within this time frame. 12/30days with greater than 4 hours of machine use. CPAP 11 cm H20    Assessment:       Mild VANESSA. CPAP 11 cm H2O. suboptimal compliance on review today, AHI has improved and is controlled  Snoring-resolved on CPAP  Fatigue-improving  Poor sleep hygiene  Obesity  HTN  Dry mouth with CPAP        Plan:      Continue CPAP 11cm H2O  Discussed chinstrap or full facemask may help with dry mouth. Patient declines at this time  Advised to use CPAP 6-8 hrs at night and during naps-continue to increase use. Replacement of mask, tubing, head straps every 3-6 months or sooner if damaged. Patient instructed to contact DME company for any mask, tubing or machine trouble shooting if problems arise. Sleep hygiene  Recommend at least 7, preferably 8 hours of sleep nightly. Discussed importance of adequate sleep time  Avoid sedatives, alcohol and caffeinated drinks at bed time. No driving motorized vehicles or operating heavy machinery while fatigue, drowsy or sleepy. Weight loss is also recommended as a long-term intervention. Complications of VANESSA if not treated were discussed with patient patient to include systemic hypertension, pulmonary hypertension  Patient education  provided regarding sleep tips and CPAP cleaning recommendations    -Discussed [employer] recently recalled some Pap units.   Patient states she has registered her CPAP with the  for the recall however has not received replacement unit yet. Discussed risks/benefits of untreated sleep apnea as well as risks/benefits of use of unit if recalled. At this time, if patients have moderate to severe sleep apnea or have severe daytime sleepiness, it is recommended continue therapy until the machine can be replaced. Discussed patient's risk based on increased daytime sleepiness without CPAP, hypoxia on sleep studies, comorbid cardiac conditions. Based upon the information we have so far, it appears the risk of stopping therapy may be higher than the risks associated with foam degradation. However, there are still many unknown variables and each patient will have to make a final determination on his or her own. Patient states she plans to continue current CPAP. Please only use cleaning methods recommended by . Follow-up: 6 months, sooner if needed    Paras Boothe, was evaluated through a synchronous (real-time) audio-video encounter. The patient (or guardian if applicable) is aware that this is a billable service, which includes applicable co-pays. This Virtual Visit was conducted with patient's (and/or legal guardian's) consent. The visit was conducted pursuant to the emergency declaration under the [address] [place] [address], [address] [place] [address] [facility] authority and the "[employer]" and [employer] General Act. Patient identification was verified, and a caregiver was present when appropriate. The patient was located at Home: [address] [place] [address]. Provider was located at Home ([place] [id number]): [place]. Total time spent for this encounter: Not billed by time    --DEE Garcia CNP on 9/2/2022 at 12:34 PM    An electronic signature was used to authenticate this note.

## 2022-09-02 NOTE — PATIENT INSTRUCTIONS

## 2022-11-16 ENCOUNTER — NURSE TRIAGE (OUTPATIENT)
Dept: OTHER | Facility: CLINIC | Age: 67
End: 2022-11-16

## 2022-11-16 NOTE — TELEPHONE ENCOUNTER
Location of patient: [address] call from Oleg at [employer] with [employer]. Subjective: Caller states \"I have congestive heart failure\"     Current Symptoms: SOB is getting worse. Can hear some respiratory distress when she is talking, sounds winded. Weakness. No chest pain. Caller states that she looks gray in the face. Unable to check BP and O2 at time of call. Onset: a few weeks ago; worsening    Associated Symptoms: reduced activity    Pain Severity: 0/10; N/A; none    Temperature: denies fever     LMP: NA Pregnant: NA    Recommended disposition: Call  Now    Care advice provided, patient verbalizes understanding; denies any other questions or concerns; instructed to call back for any new or worsening symptoms. Patient/caller agrees to calling 911    Tried to call patient back to make sure she reached EMS via phone. Call went to . Left generic message. Called patient a second time to check in with patient. Went to [facility]. Left message. Attention Provider: Thank you for allowing me to participate in the care of your patient. The patient was connected to triage in response to information provided to the ECC/PSC. Please do not respond through this encounter as the response is not directed to a shared pool.     Reason for Disposition   Bluish (or gray) lips or face    Protocols used: Breathing Difficulty-ADULT-[place]

## 2022-11-16 NOTE — TELEPHONE ENCOUNTER
Patient called back in. She had not ended up calling for 911 as advised with triage earlier today. She has been able to check her BP and O2. BP now is 106/70 and O2 is 96%. No SOB at time of call while sitting. SOB with any movement. States that she is not a \"good color\" but not as gray as she looked yesterday. Warm transferred caller to [facility] at [address] for second level triage.

## 2022-11-16 NOTE — TELEPHONE ENCOUNTER
Patient transferred from nurse triage. Adele Juan CNP advised on all and advised patient needed to call 911 now. Vy was advised on all and verbalized good understanding.

## 2022-11-23 ENCOUNTER — OFFICE VISIT (OUTPATIENT)
Dept: FAMILY MEDICINE CLINIC | Age: 67
End: 2022-11-23
Payer: MEDICARE

## 2022-11-23 VITALS
OXYGEN SATURATION: 98 % | DIASTOLIC BLOOD PRESSURE: 68 MMHG | HEART RATE: 67 BPM | SYSTOLIC BLOOD PRESSURE: 128 MMHG | WEIGHT: 232.8 LBS | TEMPERATURE: 96.9 F | HEIGHT: 67 IN | BODY MASS INDEX: 36.54 KG/M2

## 2022-11-23 DIAGNOSIS — E66.01 SEVERE OBESITY (BMI 35.0-39.9) WITH COMORBIDITY (HCC): ICD-10-CM

## 2022-11-23 DIAGNOSIS — I10 ESSENTIAL HYPERTENSION: Primary | ICD-10-CM

## 2022-11-23 DIAGNOSIS — Z23 FLU VACCINE NEED: ICD-10-CM

## 2022-11-23 PROCEDURE — 1036F TOBACCO NON-USER: CPT | Performed by: NURSE PRACTITIONER

## 2022-11-23 PROCEDURE — 1123F ACP DISCUSS/DSCN MKR DOCD: CPT | Performed by: NURSE PRACTITIONER

## 2022-11-23 PROCEDURE — G8427 DOCREV CUR MEDS BY ELIG CLIN: HCPCS | Performed by: NURSE PRACTITIONER

## 2022-11-23 PROCEDURE — 3017F COLORECTAL CA SCREEN DOC REV: CPT | Performed by: NURSE PRACTITIONER

## 2022-11-23 PROCEDURE — G8417 CALC BMI ABV UP PARAM F/U: HCPCS | Performed by: NURSE PRACTITIONER

## 2022-11-23 PROCEDURE — 3074F SYST BP LT 130 MM HG: CPT | Performed by: NURSE PRACTITIONER

## 2022-11-23 PROCEDURE — 3078F DIAST BP <80 MM HG: CPT | Performed by: NURSE PRACTITIONER

## 2022-11-23 PROCEDURE — 90694 VACC AIIV4 NO PRSRV 0.5ML IM: CPT | Performed by: NURSE PRACTITIONER

## 2022-11-23 PROCEDURE — 99213 OFFICE O/P EST LOW 20 MIN: CPT | Performed by: NURSE PRACTITIONER

## 2022-11-23 PROCEDURE — G8399 PT W/DXA RESULTS DOCUMENT: HCPCS | Performed by: NURSE PRACTITIONER

## 2022-11-23 PROCEDURE — 1090F PRES/ABSN URINE INCON ASSESS: CPT | Performed by: NURSE PRACTITIONER

## 2022-11-23 PROCEDURE — G0008 ADMIN INFLUENZA VIRUS VAC: HCPCS | Performed by: NURSE PRACTITIONER

## 2022-11-23 PROCEDURE — G8484 FLU IMMUNIZE NO ADMIN: HCPCS | Performed by: NURSE PRACTITIONER

## 2022-11-23 ASSESSMENT — ANXIETY QUESTIONNAIRES
3. WORRYING TOO MUCH ABOUT DIFFERENT THINGS: 0
1. FEELING NERVOUS, ANXIOUS, OR ON EDGE: 0
6. BECOMING EASILY ANNOYED OR IRRITABLE: 0
GAD7 TOTAL SCORE: 0
7. FEELING AFRAID AS IF SOMETHING AWFUL MIGHT HAPPEN: 0
5. BEING SO RESTLESS THAT IT IS HARD TO SIT STILL: 0
IF YOU CHECKED OFF ANY PROBLEMS ON THIS QUESTIONNAIRE, HOW DIFFICULT HAVE THESE PROBLEMS MADE IT FOR YOU TO DO YOUR WORK, TAKE CARE OF THINGS AT HOME, OR GET ALONG WITH OTHER PEOPLE: NOT DIFFICULT AT ALL
4. TROUBLE RELAXING: 0
2. NOT BEING ABLE TO STOP OR CONTROL WORRYING: 0

## 2022-11-23 ASSESSMENT — PATIENT HEALTH QUESTIONNAIRE - PHQ9
2. FEELING DOWN, DEPRESSED OR HOPELESS: 0
4. FEELING TIRED OR HAVING LITTLE ENERGY: 1
SUM OF ALL RESPONSES TO PHQ9 QUESTIONS 1 & 2: 0
SUM OF ALL RESPONSES TO PHQ QUESTIONS 1-9: 2
9. THOUGHTS THAT YOU WOULD BE BETTER OFF DEAD, OR OF HURTING YOURSELF: 0
5. POOR APPETITE OR OVEREATING: 0
7. TROUBLE CONCENTRATING ON THINGS, SUCH AS READING THE NEWSPAPER OR WATCHING TELEVISION: 0
SUM OF ALL RESPONSES TO PHQ QUESTIONS 1-9: 2
1. LITTLE INTEREST OR PLEASURE IN DOING THINGS: 0
10. IF YOU CHECKED OFF ANY PROBLEMS, HOW DIFFICULT HAVE THESE PROBLEMS MADE IT FOR YOU TO DO YOUR WORK, TAKE CARE OF THINGS AT HOME, OR GET ALONG WITH OTHER PEOPLE: 0
3. TROUBLE FALLING OR STAYING ASLEEP: 1
SUM OF ALL RESPONSES TO PHQ QUESTIONS 1-9: 2
8. MOVING OR SPEAKING SO SLOWLY THAT OTHER PEOPLE COULD HAVE NOTICED. OR THE OPPOSITE, BEING SO FIGETY OR RESTLESS THAT YOU HAVE BEEN MOVING AROUND A LOT MORE THAN USUAL: 0
6. FEELING BAD ABOUT YOURSELF - OR THAT YOU ARE A FAILURE OR HAVE LET YOURSELF OR YOUR FAMILY DOWN: 0
SUM OF ALL RESPONSES TO PHQ QUESTIONS 1-9: 2

## 2022-11-23 ASSESSMENT — ENCOUNTER SYMPTOMS
RESPIRATORY NEGATIVE: 1
EYES NEGATIVE: 1

## 2022-11-23 NOTE — PROGRESS NOTES
2022     Celio Pringle (:  1955) is a 79 y.o. female, here for evaluation of the following medical concerns:    HPI  Pt is here today for BP f/u. States that her BP was 98/50 last week so she held her Doxazosin, losartan and lasix. She was following cardiology, but has not seen them in 2 years. Takes Lasix PRN swelling. Since last week her BP has been between 114//67. Denies CP, HA, blurred vision, lightheadedness or dizziness. H/o CKD - follows Nephrology. Review of Systems   Constitutional: Negative. Eyes: Negative. Respiratory: Negative. Cardiovascular: Negative. Neurological: Negative. Prior to Visit Medications    Medication Sig Taking? Authorizing Provider   furosemide (LASIX) 40 MG tablet TAKE 1 TABLET BY MOUTH EVERY DAY Yes DEE Bravo - CNP   Calcium-Magnesium-Zinc 295-045-8 MG TABS Take by mouth Yes Historical Provider, MD   b complex vitamins capsule Take 1 capsule by mouth daily Yes Historical Provider, MD   Omega-3 Fatty Acids (FISH OIL) 1000 MG CAPS Take 1 capsule by mouth daily Yes Yin Castillo APRN - CNP   omeprazole (PRILOSEC OTC) 20 MG tablet Take 1 tablet by mouth daily Yes Michelle Louise MD   Biotin 3 MG TABS Take 3 mg by mouth daily Yes Historical Provider, MD   Cholecalciferol (VITAMIN D3 PO) Take 500 mg by mouth daily Yes Historical Provider, MD        Social History     Tobacco Use    Smoking status: Never    Smokeless tobacco: Never   Substance Use Topics    Alcohol use: Yes     Alcohol/week: 0.0 standard drinks        Vitals:    22 1406   BP: 128/68   Site: Right Upper Arm   Position: Sitting   Cuff Size: Medium Adult   Pulse: 67   Temp: 96.9 °F (36.1 °C)   TempSrc: Temporal   SpO2: 98%   Weight: 232 lb 12.8 oz (105.6 kg)   Height: 5' 7\" (1.702 m)     Estimated body mass index is 36.46 kg/m² as calculated from the following:    Height as of this encounter: 5' 7\" (1.702 m).     Weight as of this encounter: 232 lb 12.8 oz (105.6 kg). Physical Exam  Vitals reviewed. Constitutional:       Appearance: Normal appearance. HENT:      Head: Normocephalic. Cardiovascular:      Rate and Rhythm: Normal rate and regular rhythm. Pulses: Normal pulses. Heart sounds: Normal heart sounds. Pulmonary:      Effort: Pulmonary effort is normal.      Breath sounds: Normal breath sounds. Musculoskeletal:      Cervical back: Normal range of motion. Neurological:      Mental Status: She is alert and oriented to person, place, and time. Psychiatric:         Mood and Affect: Mood normal.         Behavior: Behavior normal.         Thought Content: Thought content normal.         Judgment: Judgment normal.       ASSESSMENT/PLAN:  1. Essential hypertension  BP is stable with no BP medications. Advised pt to continue to monitor BP at home. She states that she still has several Losartan 100 mg tabs. She will take 1/2 tab to 1 tab daily if BP increases to 140/90 or higher. She will also send me a message to keep me updated. 2. Flu vaccine need    - Influenza, FLUAD, (age 72 y+), IM, Preservative Free, 0.5 mL    3. Severe obesity (BMI 35.0-39. 9) with comorbidity ([address]  Encouraged healthy diet and exercise. Return for keep AWV as scheduled. .    An electronic signature was used to authenticate this note.     --DEE Sheth CNP on 11/23/2022 at 2:52 PM

## 2023-01-18 ENCOUNTER — HOSPITAL ENCOUNTER (OUTPATIENT)
Age: 68
Discharge: HOME | End: 2023-01-18
Payer: MEDICARE

## 2023-01-18 DIAGNOSIS — N18.31 STAGE 3A CHRONIC KIDNEY DISEASE (HCC): ICD-10-CM

## 2023-01-18 LAB
ANION GAP SERPL CALCULATED.3IONS-SCNC: 9 MMOL/L (ref 3–16)
BUN BLDV-MCNC: 17 MG/DL (ref 7–20)
CALCIUM SERPL-MCNC: 9.3 MG/DL (ref 8.3–10.6)
CHLORIDE BLD-SCNC: 103 MMOL/L (ref 99–110)
CO2: 28 MMOL/L (ref 21–32)
CREAT SERPL-MCNC: 0.9 MG/DL (ref 0.6–1.2)
GFR SERPL CREATININE-BSD FRML MDRD: >60 ML/MIN/{1.73_M2}
GLUCOSE BLD-MCNC: 161 MG/DL (ref 70–99)
POTASSIUM SERPL-SCNC: 3.7 MMOL/L (ref 3.5–5.1)
SODIUM BLD-SCNC: 140 MMOL/L (ref 136–145)

## 2023-01-18 PROCEDURE — 36415 COLL VENOUS BLD VENIPUNCTURE: CPT

## 2023-01-18 PROCEDURE — 80048 BASIC METABOLIC PNL TOTAL CA: CPT

## 2023-01-30 NOTE — TELEPHONE ENCOUNTER
.Refill Request     CONFIRM preferrred pharmacy with the patient. If Mail Order Rx - Pend for 90 day refill. Last Seen: Last Seen Department: 11/23/2022  Last Seen by PCP: Visit date not found    Last Written: 8-1-22 90 with 1     If no future appointment scheduled, route STAFF MESSAGE with patient name to the [facility] for scheduling. Next Appointment:   Future Appointments   Date Time Provider Luiza Hidalgo   2/28/2023  1:00 PM DEE Adam CNP [facility]   6/27/2023 10:00 [facility] [place] [facility]       Message sent to  to schedule appt with patient?   N/A      Requested Prescriptions     Pending Prescriptions Disp Refills    losartan (COZAAR) 100 MG tablet [Pharmacy Med Name: LOSARTAN POTASSIUM 100 MG TAB] 90 tablet 1     Sig: TAKE 1 TABLET BY MOUTH EVERY DAY    doxazosin (CARDURA) 4 MG tablet [Pharmacy Med Name: DOXAZOSIN MESYLATE 4 MG TAB] 90 tablet 1     Sig: TAKE 1 TABLET BY MOUTH NIGHTLY

## 2023-01-31 RX ORDER — LOSARTAN POTASSIUM 100 MG/1
TABLET ORAL
Qty: 90 TABLET | Refills: 1 | OUTPATIENT
Start: 2023-01-31

## 2023-01-31 RX ORDER — DOXAZOSIN MESYLATE 4 MG/1
4 TABLET ORAL NIGHTLY
Qty: 90 TABLET | Refills: 1 | OUTPATIENT
Start: 2023-01-31

## 2023-02-01 NOTE — TELEPHONE ENCOUNTER
Patient states that she was off them, but states that her BP was creeping back up on her. Patient states that she has enough of both medications to use right now, for about 6 months.  Should she follow up on this with you or her Cardiologist and or Nephrologist?

## 2023-02-01 NOTE — TELEPHONE ENCOUNTER
It sounds like there is some confusion to what she is/has been taking. At her OV with me on 11/23/22 she stated that her BP was staying around 98/50 so she held all 3 BP meds - Doxazosin, Losartan and Lasix. Her BP was stable at that OV without taking any BP meds. HTN was previously managed by cardiology, but she had not seen them in 2 years. Encouraged f/u with them. Plan was monitor BP, take Losartan 100 mg 1/2 tab or 1 tab daily IF BP increased to 140/90 or higher. She has not followed up with me since, so I was under the assumption that she was not on any BP meds. She also follows Nephrology for CKD and HTN. It looks like she had a visit with Dr. Louie Heard on 1/20/23 and told him that she was on 4 BP meds, and is now only on 3. Will copy him in this message as well. I would recommend that she contact her Nephrologist and Cardiologist to discuss further. We need to make sure that she is only having ONE provider manage her HTN so everyone is on the same page and to avoid any possible medication errors.    Thank you,   Lizzie Dukes PELVIC PAIN/VAGINAL BLEED

## 2023-02-28 ENCOUNTER — TELEPHONE (OUTPATIENT)
Dept: PULMONOLOGY | Age: 68
End: 2023-02-28

## 2023-02-28 ENCOUNTER — TELEMEDICINE (OUTPATIENT)
Dept: SLEEP MEDICINE | Age: 68
End: 2023-02-28
Payer: MEDICARE

## 2023-02-28 DIAGNOSIS — G47.33 MILD OBSTRUCTIVE SLEEP APNEA: Primary | ICD-10-CM

## 2023-02-28 DIAGNOSIS — Z72.821 POOR SLEEP HYGIENE: ICD-10-CM

## 2023-02-28 DIAGNOSIS — E66.9 OBESITY (BMI 30-39.9): ICD-10-CM

## 2023-02-28 DIAGNOSIS — F51.04 PSYCHOPHYSIOLOGICAL INSOMNIA: ICD-10-CM

## 2023-02-28 DIAGNOSIS — R53.83 OTHER FATIGUE: ICD-10-CM

## 2023-02-28 DIAGNOSIS — I10 ESSENTIAL HYPERTENSION: ICD-10-CM

## 2023-02-28 DIAGNOSIS — Z71.89 CPAP USE COUNSELING: ICD-10-CM

## 2023-02-28 PROCEDURE — 3017F COLORECTAL CA SCREEN DOC REV: CPT | Performed by: NURSE PRACTITIONER

## 2023-02-28 PROCEDURE — 99214 OFFICE O/P EST MOD 30 MIN: CPT | Performed by: NURSE PRACTITIONER

## 2023-02-28 PROCEDURE — 1123F ACP DISCUSS/DSCN MKR DOCD: CPT | Performed by: NURSE PRACTITIONER

## 2023-02-28 PROCEDURE — G8427 DOCREV CUR MEDS BY ELIG CLIN: HCPCS | Performed by: NURSE PRACTITIONER

## 2023-02-28 PROCEDURE — G8399 PT W/DXA RESULTS DOCUMENT: HCPCS | Performed by: NURSE PRACTITIONER

## 2023-02-28 PROCEDURE — 1090F PRES/ABSN URINE INCON ASSESS: CPT | Performed by: NURSE PRACTITIONER

## 2023-02-28 ASSESSMENT — SLEEP AND FATIGUE QUESTIONNAIRES
HOW LIKELY ARE YOU TO NOD OFF OR FALL ASLEEP WHILE LYING DOWN TO REST IN THE AFTERNOON WHEN CIRCUMSTANCES PERMIT: 1
HOW LIKELY ARE YOU TO NOD OFF OR FALL ASLEEP WHILE SITTING AND TALKING TO SOMEONE: 0
HOW LIKELY ARE YOU TO NOD OFF OR FALL ASLEEP WHILE SITTING INACTIVE IN A PUBLIC PLACE: 0
ESS TOTAL SCORE: 6
HOW LIKELY ARE YOU TO NOD OFF OR FALL ASLEEP IN A CAR, WHILE STOPPED FOR A FEW MINUTES IN TRAFFIC: 0
HOW LIKELY ARE YOU TO NOD OFF OR FALL ASLEEP WHILE SITTING AND READING: 3
HOW LIKELY ARE YOU TO NOD OFF OR FALL ASLEEP WHEN YOU ARE A PASSENGER IN A CAR FOR AN HOUR WITHOUT A BREAK: 0
HOW LIKELY ARE YOU TO NOD OFF OR FALL ASLEEP WHILE WATCHING TV: 0
HOW LIKELY ARE YOU TO NOD OFF OR FALL ASLEEP WHILE SITTING QUIETLY AFTER LUNCH WITHOUT ALCOHOL: 2

## 2023-02-28 NOTE — PATIENT INSTRUCTIONS

## 2023-02-28 NOTE — PROGRESS NOTES
Patient ID: Woody Osuna is a 79 y.o. female who is being seen today for   Chief Complaint   Patient presents with    Sleep Apnea     Referring: Dr. Phoenix Barrios    HPI:     Woody Osuna is a 79 y.o. female for televideo appointment via video and audio virtual visit for VANESSA follow up. [facility] she did receive replacement CPAP from the  for the recall.  sleep has not been good lately. States worse over past month.  dog passed in November.  has been unmotivated to do anything. Patient is using CPAP   3-5 hrs/night. Using humidifier. No snoring on CPAP. The pressure is well tolerated. The mask is comfortable- nasal mask. No mask leak. No significant daytime sleepiness. No nodding off when driving. No dry nose or throat.  +fatigue. Bedtime is MN-1 am and rise time is noon or later (whenever she feels like it). Sleep onset is usually few-5 minutes- but is on phone prior. Wakes up 1 times at night total. 1 nocturia. It takes unknown minutes to fall back a sleep- may or may not keep CPAP on, plays on phone. Occasional naps during the day, might doze. No headache in am. No weight gain. 0-1 caffienated beverages during the day. No alcohol. ESS is 6     Previous HPI   Woody Osuna is a 79 y.o. female for televideo appointment via video and audio doxy. me virtual visit for VANESSA follow up. [facility] she has not received replacement CPAP from the  for the recall yet.  sometimes does not go to her bed.  has an ill dog.  also had COVID recently and could not use CPAP for awhile. Patient is using CPAP   4 hrs/night. Using humidifier. No snoring on CPAP. The pressure is well tolerated. The mask is comfortable- nasal mask. No mask leak. No significant daytime sleepiness. No nodding off when driving. +Dry mouth. Some fatigue. Bedtime is 2 am and rise time is 6-10 am. Sleep onset is few minutes -when put phone down right to sleep.  Wakes up 0 times at night total. No nocturia. Occasional naps during the day. She denies trouble sleeping, states her sleep pattern is poor however that is how she chooses to sleep. No headache in am. No weight gain. 0-1 caffienated beverages during the day. Rare alcohol. ESS is 5        Previous 3/1/22  Bell Pink is a 79 y.o. female for televideo appointment via video and audio doxy. me virtual visit for VANESSA follow up. States she is doing okay with CPAP. Patient is using CPAP 5-6 hrs/night. Using humidifier. No snoring on CPAP. The pressure is well tolerated. The mask is comfortable- nasal. No mask leak. No significant daytime sleepiness. No nodding off when driving. +dry mouth- declines full face or chin strap. No fatigue. Bedtime is MN  and rise time is 9 am. Sleep onset is few minutes. Wakes up 1 times at night total. 1 nocturia. It takes usually few minutes to fall back a sleep. Rare naps during the day. No headache in am. No weight gain. 0-1 caffienated beverages during the day. Rare alcohol. ESS is 3. Previous 9/28/21  Bell Pink is a 79 y.o. female for televideo appointment via video and audio doxy. me virtual visit for VANESSA follow up. Patient is using CPAP 6-8 hrs/night. She did forget a piece of her mask when she went on vacation recently and was unable to use for about a week. She was more tired when not using CPAP with increased morning headaches and napping. Using humidifier. No snoring on CPAP. The pressure is well tolerated. The mask is somewhat comfortable- dreamwear nasal . States she might want to try brigitte loops. No mask leak. No significant daytime sleepiness. No nodding off when driving. No dry nose or throat. No fatigue. Bedtime is MN and rise time is 9 am. Sleep onset is few minutes. Wakes up 0-1 times at night total. 0-1 nocturia. It takes few minutes to fall back a sleep. few naps during the day. +headache in am since not using CPAP. No weight gain. 0-1 caffienated beverages during the day.  Occasional alcohol. ESS is 3        Previous HPI 6/4/19  Alexandrea Crawford is a 79 y.o. female in office for VANESSA follow up. HST was reviewed by me and noted below. Results were dicussed with patient and multiple good questions were answered. It showed no significant sleep related breathing disorder however low SPO2 83% in under 89% for 39.4 minutes     Bedtime is MN and rise time is 7 am Sleep onset is few minutes. Wakes up 0-3 times at night total. 0-3 nocturia. It takes few minutes to fall back a sleep. No naps during the day. Occasional headache in am. No weight gain. 2 caffienated beverages during the day. No alcohol. ESS is 4      Initial HPI 5/22/19  Alexandrea Crawford is a 79 y.o. female in office for sleep apnea evaluation. Patient reports snoring at night for the past 10 years. Worse in supine position. Occasionally wakes self snoring. Unsure if witnessed apnea, sleeps alone. Wakes up at night choking and gasping for air. No restorative sleep. No dry mouth upon awakening. Fatigue and tiredness during the day. No history of sleep apnea. Bedtime 10 pm and rise time is 7 am. It takes few minutes to fall asleep- plays on phone until gets sleepy then right to sleep. No TV in bedroom. 1 nocturia. Wakes up 1 times at night. It takes few minutes to fall back a sleep. Takes No nap during the day. Occasional headache in am. No car wrecks or near wrecks because of the sleepiness. No nodding off while driving. Gained 70 pounds in the past 9 years. No forgetfulness or decreased concentration. No nasal congestion at night. Drinks 2 caffinated beverages per day. No significant alcohol. No restless feelings in legs at night. No teeth grinding, maybe some clentching. No nightmares. No sleep walking. No night time panic attacks. No narcotics. No drug abuse. +history of depression. +history of anxiety. No history of atrial fibrillation. No history of DM. +history of HTN. No history of ischemic heart disease. No history of stroke. ESS is 1. No smoking. No FH for RLS. +FH for VANESSA- dad. States son has narcolepsy. Blood pressure is elevated in office today. Denies CP, vision change or Headache. States she is working with PCP and cardiology    Sleep Medicine 2/28/2023 9/2/2022 3/1/2022 9/28/2021 3/1/2021 11/5/2020 10/15/2019   Sitting and reading 3 1 1 1 0 0 0   Watching TV 0 1 0 0 0 0 0   Sitting, inactive in a public place (e.g. a theatre or a meeting) 0 0 0 0 0 0 0   As a passenger in a car for an hour without a break 0 1 0 0 0 0 0   Lying down to rest in the afternoon when circumstances permit 1 2 2 2 2 0 3   Sitting and talking to someone 0 0 0 0 0 0 0   Sitting quietly after a lunch without alcohol 2 0 0 0 1 0 0   In a car, while stopped for a few minutes in traffic 0 0 0 0 0 0 0   [place] Sleepiness Score 6 5 3 3 3 0 3   Neck circumference (Inches) - - - - - - 15.75       Past Medical History:  Past Medical History:   Diagnosis Date    Acquired hypothyroidism 1/6/2016    Adjustment reaction with anxiety and depression 10/19/2015    Chronic diastolic congestive heart failure ([address] 9/30/2019    Depression     Essential hypertension 10/19/2015    Gastroesophageal reflux disease without esophagitis 10/19/2015    Hiatal hernia     Hypertension     Kidney failure     stage 3A    Obesity, Class II, BMI 35-39.9, with comorbidity 10/19/2015    Sleep apnea        Past Surgical History:        Procedure Laterality Date    COLONOSCOPY  2005    COLONOSCOPY      EGD COLONOSCOPY  1/9/2020    EGD ESOPHAGOGASTRODUODENOSCOPY DILAT[facility] ([address] [place] [address])      PARTIAL HYSTERECTOMY (CERVIX NOT REMOVED)  2000    due to Fibroid    UPPER GASTROINTESTINAL ENDOSCOPY N/A 1/9/2020    EGD BIOPSY performed by Piotr Farley MD at [facility]:  is allergic to sulfa antibiotics. Social History:    TOBACCO:   reports that she has never smoked.  She has never used smokeless tobacco.  ETOH:   reports current alcohol use. Family History:       Problem Relation Age of Onset    Heart Disease Mother         A. Fib    Alzheimer's Disease Mother     Arthritis Father     Hearing Loss Father     Heart Disease Father     Other Father         Alpha 1 antitrypson deficiency    [de-identified] / [place] Sister     Other Sister         MS and Alpha 1 antitrypson deficiency    Kidney Disease Brother         Kidney stone    Stroke Maternal Grandmother     Heart Disease Maternal Grandmother     Diabetes Paternal Grandfather     Diabetes Paternal Aunt     Other Paternal Uncle         Alpha 1 antitrypson deficiency    Early Death Maternal Grandfather 47    Other Paternal Grandmother         Alpha 1 antitrypson deficiency    Cirrhosis Paternal Grandmother         Non alcoholic    Asthma Neg Hx     Birth Defects Neg Hx     Cancer Neg Hx     Depression Neg Hx     High Blood Pressure Neg Hx     High Cholesterol Neg Hx     Learning Disabilities Neg Hx     Mental Illness Neg Hx     Mental Retardation Neg Hx     Substance Abuse Neg Hx        Current Medications:    Current Outpatient Medications:     DOXAZOSIN MESYLATE PO, , Disp: , Rfl:     LOSARTAN POTASSIUM PO, , Disp: , Rfl:     furosemide (LASIX) 40 MG tablet, TAKE 1 TABLET BY MOUTH EVERY DAY, Disp: 90 tablet, Rfl: 3    Calcium-Magnesium-Zinc 333-133-5 MG TABS, Take by mouth, Disp: , Rfl:     b complex vitamins capsule, Take 1 capsule by mouth daily, Disp: , Rfl:     Omega-3 Fatty Acids (FISH OIL) 1000 MG CAPS, Take 1 capsule by mouth daily, Disp: 30 capsule, Rfl: 0    omeprazole (PRILOSEC OTC) 20 MG tablet, Take 1 tablet by mouth daily, Disp: 90 tablet, Rfl: 1    Biotin 3 MG TABS, Take 3 mg by mouth daily, Disp: , Rfl:     Cholecalciferol (VITAMIN D3 PO), Take 500 mg by mouth daily, Disp: , Rfl:         Review of Systems        Objective:   PHYSICAL EXAM:  There were no vitals taken for this visit.     Physical Exam  Exam:  Gen: No acute distress, does not appear to be in pain. Appears well developed and nourished. HENT: Head is normocephalic and atraumatic. Normal appearing nose. External Ears normal.   Neck: No visualized mass. Trachea is midline   Eyes: EOM intact. No visible discharge. Resp:No visualized signs of difficulty breathing or respiratory distress, speaking in full sentences. Respiratory effort normal.  Neuro: Awake. Alert. Able to follow commands. No facial asymmetry. Psych: Oriented x 3. No anxiety. Normal affect. DATA:   6/3/19 HST No significant sleep related breathing disorder, low SPO2 83%, under 89% for 39.4 minutes. Recommendations in lab PSG as HST could be falsely negative given degree of hypoxia  6/21/2019 PSG AHI 5.5/REM AHI 13.8, low SPO2 83%  8/5/2019 titration-controlled sleep-related breathing with CPAP, recommendation CPAP 8-12 cm H2O    CPAP compliance data:  Compliance download report from 9/14/19 to 10/13/19 reviewed today by me and showed patient is using machine 5:32 hrs/night with 73.3% compliance and AHI 4.2 within this time frame. 22/30days with greater than 4 hours of machine use. 90% pressure 9.5 cm H20 on auto CPAP 8-12 cm H2O    Compliance download report from 9/25/20 to 10/25/20 reviewed today by me and showed patient is using machine 7:27 hrs/night with 77.4% compliance and AHI 3.1 within this time frame. 24/30days with greater than 4 hours of machine use. 90% pressure 10.3 cm H20 on auto CPAP 9-13 cm H2O    Compliance download report from 1/25/21 to 2/23/21 reviewed today by me and showed patient is using machine 6:19 hrs/night with 80% compliance and AHI 2.3 within this time frame. 26/30days with greater than 4 hours of machine use. 90% pressure 10.5 cm H20 on 9-13 cm H2O    Compliance download report from 8/28/21 to 9/26/21 reviewed today by me and showed patient is using machine 6:32 hrs/night with 63% compliance and AHI 12 within this time frame.   19/30days with greater than 4 hours of machine use. 90% pressure 11 cm H20 on auto CPAP  9-13 cm H2O    CPAP download report from 10/9/2021-11/7/2021 on auto CPAP 10-14 cm H2O reviewed. Compliance is good 87%. AHI is improving however still elevated 8.9. CPAP download from 11/14/2021-12/13/2021 on auto CPAP 10-14 cm H2O reviewed. Compliance is good 90%. AHI has improved and is 5.0. Compliance download report from 1/29/22 to 2/27/22 reviewed today by me and showed patient is using machine 5:55 hrs/night with 90% compliance and AHI 8.1 within this time frame. 27/30days with greater than 4 hours of machine use. 90% pressure 12.5 cm H20 on auto CPAP 10-14 cm H2O    CPAP download report from 1/23/2022-2/21/2022 on auto CPAP 11-15 cm H2O reviewed. Compliance is good 83%. AHI has elevated 11.3. Compliance download report from 7/26/22 to 8/24/22 reviewed today by me and showed patient is using machine 5:01 hrs/night with 40% compliance and AHI 2.4 within this time frame. 12/30days with greater than 4 hours of machine use. CPAP 11 cm H20    Replacement DS 2:  Compliance download report from 1/28/23 to 2/26/23 reviewed today by me and showed patient is using machine 5:48 hrs/night with 70% compliance and AHI 6.9 within this time frame. 21/30days with greater than 4 hours of machine use. 90% pressure 12.2 cm H20 on auto CPAP 11-15 cm H2O     Assessment:       Mild VANESSA. CPAP 11-15 cm H2O (should be CPAP 11). Optimal compliance on review today, residual AHI is 6.9 however CPAP pressure set incorrectly on replacement CPAP  Snoring-resolved on CPAP  Fatigue-improving  Sleep maintenance insomnia-psychophysiological hyperarousal, poor sleep hygiene contributing  Poor sleep hygiene  Obesity  HTN        Plan:      Send order to change to CPAP 11cm H2O  Follow AHI and adjust pressure if needed  Interpreted and reviewed CPAP download data with patient  Advised to use CPAP 6-8 hrs at night and during naps-continue to increase use.   Replacement of mask, tubing, head straps every 3-6 months or sooner if damaged. Patient instructed to contact DME company for any mask, tubing or machine trouble shooting if problems arise. Sleep hygiene  Cognitive behavioral therapy was discussed with patient including stimulus control and sleep restriction   Discussed recommend decrease time in bed, no phone in bed  Recommend at least 7, preferably 8 hours of sleep nightly. Discussed importance of adequate sleep time  Avoid sedatives, alcohol and caffeinated drinks at bed time. No driving motorized vehicles or operating heavy machinery while fatigue, drowsy or sleepy. Weight loss is also recommended as a long-term intervention. Complications of VANESSA if not treated were discussed with patient patient to include systemic hypertension, pulmonary hypertension  Patient education  provided regarding sleep tips and CPAP cleaning recommendations  Continue blood pressure medications as ordered by treating providers- treatment of VANESSA can lower blood pressure by levels that are clinically significant. Sirisha Valle, was evaluated through a synchronous (real-time) audio-video encounter. The patient (or guardian if applicable) is aware that this is a billable service, which includes applicable co-pays. This Virtual Visit was conducted with patient's (and/or legal guardian's) consent. The visit was conducted pursuant to the emergency declaration under the [facility], [address] [place] [address] [facility] authority and the [employer] General Act. Patient identification was verified, and a caregiver was present when appropriate. The patient was located at Home: [address] [place] [address]  Provider was located at Home ([facility]): [place]         Total time spent for this encounter:  >30 minutes    --DEE Fontenot CNP on 2/28/2023 at 5:13 PM    An electronic signature was used to authenticate this note. minutes

## 2023-06-27 ENCOUNTER — OFFICE VISIT (OUTPATIENT)
Dept: FAMILY MEDICINE CLINIC | Age: 68
End: 2023-06-27

## 2023-06-27 VITALS
DIASTOLIC BLOOD PRESSURE: 72 MMHG | HEART RATE: 62 BPM | OXYGEN SATURATION: 99 % | WEIGHT: 244 LBS | SYSTOLIC BLOOD PRESSURE: 138 MMHG | HEIGHT: 67 IN | BODY MASS INDEX: 38.3 KG/M2

## 2023-06-27 DIAGNOSIS — R73.03 PREDIABETES: ICD-10-CM

## 2023-06-27 DIAGNOSIS — N18.31 STAGE 3A CHRONIC KIDNEY DISEASE (HCC): ICD-10-CM

## 2023-06-27 DIAGNOSIS — Z00.00 MEDICARE ANNUAL WELLNESS VISIT, SUBSEQUENT: Primary | ICD-10-CM

## 2023-06-27 DIAGNOSIS — E66.01 SEVERE OBESITY (BMI 35.0-39.9) WITH COMORBIDITY (HCC): ICD-10-CM

## 2023-06-27 DIAGNOSIS — Z00.00 HEALTHCARE MAINTENANCE: ICD-10-CM

## 2023-06-27 DIAGNOSIS — G47.30 SLEEP APNEA, UNSPECIFIED TYPE: ICD-10-CM

## 2023-06-27 DIAGNOSIS — Z12.31 ENCOUNTER FOR SCREENING MAMMOGRAM FOR MALIGNANT NEOPLASM OF BREAST: ICD-10-CM

## 2023-06-27 DIAGNOSIS — K63.5 HYPERPLASTIC COLONIC POLYP, UNSPECIFIED PART OF COLON: ICD-10-CM

## 2023-06-27 DIAGNOSIS — I10 ESSENTIAL HYPERTENSION: ICD-10-CM

## 2023-06-27 DIAGNOSIS — I50.32 CHRONIC DIASTOLIC CONGESTIVE HEART FAILURE (HCC): ICD-10-CM

## 2023-06-27 PROBLEM — R94.31 ABNORMAL EKG: Status: RESOLVED | Noted: 2019-05-20 | Resolved: 2023-06-27

## 2023-06-27 PROBLEM — R06.09 DOE (DYSPNEA ON EXERTION): Status: RESOLVED | Noted: 2019-05-20 | Resolved: 2023-06-27

## 2023-06-27 LAB
ALBUMIN SERPL-MCNC: 4.6 G/DL (ref 3.4–5)
ALBUMIN/GLOB SERPL: 2.3 {RATIO} (ref 1.1–2.2)
ALP SERPL-CCNC: 139 U/L (ref 40–129)
ALT SERPL-CCNC: 24 U/L (ref 10–40)
ANION GAP SERPL CALCULATED.3IONS-SCNC: 15 MMOL/L (ref 3–16)
AST SERPL-CCNC: 24 U/L (ref 15–37)
BASOPHILS # BLD: 0.1 K/UL (ref 0–0.2)
BASOPHILS NFR BLD: 1.2 %
BILIRUB SERPL-MCNC: 0.9 MG/DL (ref 0–1)
BUN SERPL-MCNC: 19 MG/DL (ref 7–20)
CALCIUM SERPL-MCNC: 9.8 MG/DL (ref 8.3–10.6)
CHLORIDE SERPL-SCNC: 101 MMOL/L (ref 99–110)
CHOLEST SERPL-MCNC: 156 MG/DL (ref 0–199)
CO2 SERPL-SCNC: 25 MMOL/L (ref 21–32)
CREAT SERPL-MCNC: 1.2 MG/DL (ref 0.6–1.2)
DEPRECATED RDW RBC AUTO: 14.1 % (ref 12.4–15.4)
EOSINOPHIL # BLD: 0.2 K/UL (ref 0–0.6)
EOSINOPHIL NFR BLD: 3 %
GFR SERPLBLD CREATININE-BSD FMLA CKD-EPI: 49 ML/MIN/{1.73_M2}
GLUCOSE SERPL-MCNC: 198 MG/DL (ref 70–99)
HCT VFR BLD AUTO: 42.4 % (ref 36–48)
HDLC SERPL-MCNC: 42 MG/DL (ref 40–60)
HGB BLD-MCNC: 14.8 G/DL (ref 12–16)
LDLC SERPL CALC-MCNC: 95 MG/DL
LYMPHOCYTES # BLD: 1.3 K/UL (ref 1–5.1)
LYMPHOCYTES NFR BLD: 23.2 %
MCH RBC QN AUTO: 30.7 PG (ref 26–34)
MCHC RBC AUTO-ENTMCNC: 34.9 G/DL (ref 31–36)
MCV RBC AUTO: 87.9 FL (ref 80–100)
MONOCYTES # BLD: 0.5 K/UL (ref 0–1.3)
MONOCYTES NFR BLD: 8.6 %
NEUTROPHILS # BLD: 3.7 K/UL (ref 1.7–7.7)
NEUTROPHILS NFR BLD: 64 %
PLATELET # BLD AUTO: 163 K/UL (ref 135–450)
PMV BLD AUTO: 9.3 FL (ref 5–10.5)
POTASSIUM SERPL-SCNC: 4.1 MMOL/L (ref 3.5–5.1)
PROT SERPL-MCNC: 6.6 G/DL (ref 6.4–8.2)
RBC # BLD AUTO: 4.82 M/UL (ref 4–5.2)
SODIUM SERPL-SCNC: 141 MMOL/L (ref 136–145)
TRIGL SERPL-MCNC: 96 MG/DL (ref 0–150)
VLDLC SERPL CALC-MCNC: 19 MG/DL
WBC # BLD AUTO: 5.8 K/UL (ref 4–11)

## 2023-06-27 SDOH — HEALTH STABILITY: PHYSICAL HEALTH: ON AVERAGE, HOW MANY MINUTES DO YOU ENGAGE IN EXERCISE AT THIS LEVEL?: 0 MIN

## 2023-06-27 SDOH — ECONOMIC STABILITY: FOOD INSECURITY: WITHIN THE PAST 12 MONTHS, YOU WORRIED THAT YOUR FOOD WOULD RUN OUT BEFORE YOU GOT MONEY TO BUY MORE.: NEVER TRUE

## 2023-06-27 SDOH — ECONOMIC STABILITY: INCOME INSECURITY: HOW HARD IS IT FOR YOU TO PAY FOR THE VERY BASICS LIKE FOOD, HOUSING, MEDICAL CARE, AND HEATING?: NOT HARD AT ALL

## 2023-06-27 SDOH — HEALTH STABILITY: PHYSICAL HEALTH: ON AVERAGE, HOW MANY DAYS PER WEEK DO YOU ENGAGE IN MODERATE TO STRENUOUS EXERCISE (LIKE A BRISK WALK)?: 0 DAYS

## 2023-06-27 SDOH — ECONOMIC STABILITY: FOOD INSECURITY: WITHIN THE PAST 12 MONTHS, THE FOOD YOU BOUGHT JUST DIDN'T LAST AND YOU DIDN'T HAVE MONEY TO GET MORE.: NEVER TRUE

## 2023-06-27 ASSESSMENT — LIFESTYLE VARIABLES
HOW OFTEN DO YOU HAVE SIX OR MORE DRINKS ON ONE OCCASION: 1
HOW OFTEN DO YOU HAVE A DRINK CONTAINING ALCOHOL: 2
HOW OFTEN DO YOU HAVE A DRINK CONTAINING ALCOHOL: MONTHLY OR LESS
HOW MANY STANDARD DRINKS CONTAINING ALCOHOL DO YOU HAVE ON A TYPICAL DAY: 1
HOW MANY STANDARD DRINKS CONTAINING ALCOHOL DO YOU HAVE ON A TYPICAL DAY: 1 OR 2

## 2023-06-27 ASSESSMENT — PATIENT HEALTH QUESTIONNAIRE - PHQ9
SUM OF ALL RESPONSES TO PHQ QUESTIONS 1-9: 0
1. LITTLE INTEREST OR PLEASURE IN DOING THINGS: 0
SUM OF ALL RESPONSES TO PHQ QUESTIONS 1-9: 0
SUM OF ALL RESPONSES TO PHQ9 QUESTIONS 1 & 2: 0
2. FEELING DOWN, DEPRESSED OR HOPELESS: 0

## 2023-06-28 ENCOUNTER — HOSPITAL ENCOUNTER (OUTPATIENT)
Dept: WOMENS IMAGING | Age: 68
Discharge: HOME | End: 2023-06-28
Payer: MEDICARE

## 2023-06-28 DIAGNOSIS — Z12.31 ENCOUNTER FOR SCREENING MAMMOGRAM FOR MALIGNANT NEOPLASM OF BREAST: ICD-10-CM

## 2023-06-28 LAB
EST. AVERAGE GLUCOSE BLD GHB EST-MCNC: 154.2 MG/DL
HBA1C MFR BLD: 7 %

## 2023-06-28 PROCEDURE — 77063 BREAST TOMOSYNTHESIS BI: CPT

## 2023-06-29 ENCOUNTER — OFFICE VISIT (OUTPATIENT)
Dept: CARDIOLOGY CLINIC | Age: 68
End: 2023-06-29
Payer: MEDICARE

## 2023-06-29 VITALS
SYSTOLIC BLOOD PRESSURE: 116 MMHG | OXYGEN SATURATION: 96 % | BODY MASS INDEX: 38.64 KG/M2 | HEART RATE: 67 BPM | DIASTOLIC BLOOD PRESSURE: 70 MMHG | WEIGHT: 246.2 LBS | HEIGHT: 67 IN

## 2023-06-29 DIAGNOSIS — R92.8 ABNORMAL MAMMOGRAM OF RIGHT BREAST: Primary | ICD-10-CM

## 2023-06-29 DIAGNOSIS — I50.32 CHRONIC DIASTOLIC CONGESTIVE HEART FAILURE (HCC): ICD-10-CM

## 2023-06-29 DIAGNOSIS — R06.02 SOB (SHORTNESS OF BREATH) ON EXERTION: Primary | ICD-10-CM

## 2023-06-29 DIAGNOSIS — I10 ESSENTIAL HYPERTENSION: ICD-10-CM

## 2023-06-29 PROCEDURE — G8417 CALC BMI ABV UP PARAM F/U: HCPCS | Performed by: INTERNAL MEDICINE

## 2023-06-29 PROCEDURE — 1090F PRES/ABSN URINE INCON ASSESS: CPT | Performed by: INTERNAL MEDICINE

## 2023-06-29 PROCEDURE — 3017F COLORECTAL CA SCREEN DOC REV: CPT | Performed by: INTERNAL MEDICINE

## 2023-06-29 PROCEDURE — 1036F TOBACCO NON-USER: CPT | Performed by: INTERNAL MEDICINE

## 2023-06-29 PROCEDURE — 93000 ELECTROCARDIOGRAM COMPLETE: CPT | Performed by: INTERNAL MEDICINE

## 2023-06-29 PROCEDURE — 1123F ACP DISCUSS/DSCN MKR DOCD: CPT | Performed by: INTERNAL MEDICINE

## 2023-06-29 PROCEDURE — G8399 PT W/DXA RESULTS DOCUMENT: HCPCS | Performed by: INTERNAL MEDICINE

## 2023-06-29 PROCEDURE — 3078F DIAST BP <80 MM HG: CPT | Performed by: INTERNAL MEDICINE

## 2023-06-29 PROCEDURE — G8427 DOCREV CUR MEDS BY ELIG CLIN: HCPCS | Performed by: INTERNAL MEDICINE

## 2023-06-29 PROCEDURE — 3074F SYST BP LT 130 MM HG: CPT | Performed by: INTERNAL MEDICINE

## 2023-06-29 PROCEDURE — 99214 OFFICE O/P EST MOD 30 MIN: CPT | Performed by: INTERNAL MEDICINE

## 2023-06-29 RX ORDER — FUROSEMIDE 40 MG/1
TABLET ORAL
Qty: 90 TABLET | Refills: 3 | Status: SHIPPED | OUTPATIENT
Start: 2023-06-29

## 2023-06-29 RX ORDER — LOSARTAN POTASSIUM 100 MG/1
100 TABLET ORAL DAILY
Qty: 90 TABLET | Refills: 3 | Status: SHIPPED | OUTPATIENT
Start: 2023-06-29

## 2023-06-29 RX ORDER — DOXAZOSIN MESYLATE 4 MG/1
4 TABLET ORAL NIGHTLY
Qty: 90 TABLET | Refills: 3 | Status: SHIPPED | OUTPATIENT
Start: 2023-06-29

## 2023-07-10 ENCOUNTER — HOSPITAL ENCOUNTER (OUTPATIENT)
Dept: MAMMOGRAPHY | Age: 68
Discharge: HOME | End: 2023-07-10
Payer: MEDICARE

## 2023-07-10 ENCOUNTER — HOSPITAL ENCOUNTER (OUTPATIENT)
Dept: ULTRASOUND IMAGING | Age: 68
Discharge: HOME | End: 2023-07-10
Payer: MEDICARE

## 2023-07-10 DIAGNOSIS — R92.8 ABNORMAL MAMMOGRAM OF RIGHT BREAST: ICD-10-CM

## 2023-07-10 PROCEDURE — G0279 TOMOSYNTHESIS, MAMMO: HCPCS

## 2023-07-10 PROCEDURE — 76642 ULTRASOUND BREAST LIMITED: CPT

## 2023-07-13 ENCOUNTER — TELEPHONE (OUTPATIENT)
Dept: FAMILY MEDICINE CLINIC | Age: 68
End: 2023-07-13

## 2023-07-13 ENCOUNTER — OFFICE VISIT (OUTPATIENT)
Dept: FAMILY MEDICINE CLINIC | Age: 68
End: 2023-07-13

## 2023-07-13 VITALS
OXYGEN SATURATION: 97 % | DIASTOLIC BLOOD PRESSURE: 62 MMHG | SYSTOLIC BLOOD PRESSURE: 122 MMHG | WEIGHT: 240 LBS | HEART RATE: 62 BPM | BODY MASS INDEX: 37.67 KG/M2 | HEIGHT: 67 IN

## 2023-07-13 DIAGNOSIS — E11.9 TYPE 2 DIABETES MELLITUS WITHOUT COMPLICATION, WITHOUT LONG-TERM CURRENT USE OF INSULIN (HCC): Primary | ICD-10-CM

## 2023-07-13 DIAGNOSIS — E11.9 TYPE 2 DIABETES MELLITUS WITHOUT COMPLICATION, WITHOUT LONG-TERM CURRENT USE OF INSULIN (HCC): ICD-10-CM

## 2023-07-13 DIAGNOSIS — Z86.39 HISTORY OF HYPOTHYROIDISM: ICD-10-CM

## 2023-07-13 RX ORDER — BLOOD SUGAR DIAGNOSTIC
1 STRIP MISCELLANEOUS DAILY
Qty: 100 EACH | Refills: 3 | Status: SHIPPED | OUTPATIENT
Start: 2023-07-13

## 2023-07-13 RX ORDER — SEMAGLUTIDE 1.34 MG/ML
0.25 INJECTION, SOLUTION SUBCUTANEOUS WEEKLY
Qty: 1 ADJUSTABLE DOSE PRE-FILLED PEN SYRINGE | Refills: 1 | Status: SHIPPED | OUTPATIENT
Start: 2023-07-13 | End: 2023-07-13 | Stop reason: RX

## 2023-07-13 RX ORDER — SEMAGLUTIDE 0.68 MG/ML
0.25 INJECTION, SOLUTION SUBCUTANEOUS WEEKLY
Qty: 3 ML | Refills: 1 | Status: SHIPPED | OUTPATIENT
Start: 2023-07-13

## 2023-07-13 RX ORDER — LANCETS
1 EACH MISCELLANEOUS
Qty: 102 EACH | Refills: 1 | Status: SHIPPED | OUTPATIENT
Start: 2023-07-13

## 2023-07-13 RX ORDER — BLOOD-GLUCOSE METER
1 EACH MISCELLANEOUS
Qty: 1 KIT | Refills: 0 | Status: SHIPPED | OUTPATIENT
Start: 2023-07-13

## 2023-07-13 NOTE — TELEPHONE ENCOUNTER
Called and left a message for patient to call back regarding her appointment time. Heber Graf is requesting that she come in at 2:30 instead of 2:45.

## 2023-07-13 NOTE — PROGRESS NOTES
Ozempic. Advised pt to monitor fasting BS every morning. Informed pt of s/s of hypoglycemia and when to call me. F/u in 3 months or sooner if needed. Will recheck A1C at that time. -     Semaglutide,0.25 or 0.5MG/DOS, (OZEMPIC, 0.25 OR 0.5 MG/DOSE,) 2 MG/1.5ML SOPN; Inject 0.25 mg into the skin once a week, Disp-1 Adjustable Dose Pre-filled Pen Syringe, R-1Normal  -     Blood Glucose Monitoring Suppl (ACCU-CHEK XIOMARA PLUS) w/Device KIT; DAILY BEFORE BREAKFAST Starting u 7/13/2023, Disp-1 kit, R-0, Normal  -     blood glucose test strips (ACCU-CHEK XIOMARA PLUS) strip; 1 each by In Vitro route daily As needed. , Disp-100 each, R-3Normal  -     Accu-Chek FastClix Lancets MISC; DAILY BEFORE BREAKFAST Starting u 7/13/2023, Disp-102 each, R-1, Normal    History of hypothyroidism  Currently not on medication. C/o trouble losing weight despite exercising and following a strict diet. Will f/u with pt regarding results and POC.     -     TSH with Reflex; Future    Return in about 3 months (around 10/13/2023) for Diabetes - at my new office.          --DEE Gillis - CNP

## 2023-07-13 NOTE — TELEPHONE ENCOUNTER
Pharmacy calling to clarify medication order/prescription:    Pharmacy:  [facility]     Medication:  ozempic 0.25-0.5 mg  Sig: Inject 0.25 mg into the skin once a week  Quantity: 1 Adjustable Dose Pre-filled Pen Syringe    Comments:  alternative requested: this no longer comes in this dosing size package size.  Please resend for ozempic 0.25/0.5 3ML

## 2023-07-14 LAB — TSH SERPL DL<=0.005 MIU/L-ACNC: 1.65 UIU/ML (ref 0.27–4.2)

## 2023-07-17 ENCOUNTER — TELEPHONE (OUTPATIENT)
Dept: FAMILY MEDICINE CLINIC | Age: 68
End: 2023-07-17

## 2023-07-17 DIAGNOSIS — E11.9 TYPE 2 DIABETES MELLITUS WITHOUT COMPLICATION, WITHOUT LONG-TERM CURRENT USE OF INSULIN (HCC): ICD-10-CM

## 2023-07-17 NOTE — TELEPHONE ENCOUNTER
Pharmacy calling to clarify medication order/prescription:    Pharmacy:  [facility] [address]     Medication:  Blood Glucose Monitoring Suppl (ACCU-CHEK XIOMARA PLUS) w/Device KIT     Si kit by Does not apply route every morning (before breakfast  Quantity:       Comments:  need an accu check meter script with dx codes to go along with the test strips sent last week

## 2023-07-17 NOTE — TELEPHONE ENCOUNTER
Refill Request     CONFIRM preferred pharmacy with the patient. If Mail Order Rx - Pend for 90 day refill. Last Seen: Last Seen Department: 2023  Last Seen by PCP: 2023    Last Written: , #1kit, 0 refills    If no future appointment scheduled:  Review the last OV with PCP and review information for follow-up visit,  Route STAFF MESSAGE with patient name to the [facility] for scheduling with the following information:            -  Timing of next visit           -  Visit type ie Physical, OV, etc           -  Diagnoses/Reason ie. COPD, HTN - Do not use MEDICATION, Follow-up or CHECK UP - Give reason for visit      Next Appointment:   Future Appointments   Date Time Provider [address]   2023  7:30 AM A ECHO ROOM 2 [facility] AND CAR Josefina Class   2023  8:15 AM [facility] NUCLEAR STRESS ROOM [facility] AND CAR Sharon Waite [facility]   2023  1:00 PM DEE Rosario - [facility]   10/17/2023  2:15 PM Felicitas Tabor MD P CLER CAR [facility]       Message sent to [address] [place] [address] to schedule appt with patient?   N/A      Requested Prescriptions     Pending Prescriptions Disp Refills    Blood Glucose Monitoring Suppl (ACCU-CHEK XIOMARA PLUS) w/Device KIT 1 kit 0     Si kit by Does not apply route every morning (before breakfast)

## 2023-07-18 RX ORDER — BLOOD-GLUCOSE METER
1 EACH MISCELLANEOUS
Qty: 1 KIT | Refills: 0 | OUTPATIENT
Start: 2023-07-18

## 2023-07-19 ENCOUNTER — PATIENT MESSAGE (OUTPATIENT)
Dept: FAMILY MEDICINE CLINIC | Age: 68
End: 2023-07-19

## 2023-07-19 DIAGNOSIS — E11.9 TYPE 2 DIABETES MELLITUS WITHOUT COMPLICATION, WITHOUT LONG-TERM CURRENT USE OF INSULIN (HCC): Primary | ICD-10-CM

## 2023-07-19 DIAGNOSIS — E11.9 TYPE 2 DIABETES MELLITUS WITHOUT COMPLICATION, WITHOUT LONG-TERM CURRENT USE OF INSULIN (HCC): ICD-10-CM

## 2023-07-19 NOTE — TELEPHONE ENCOUNTER
Please schedule the pt for an OV. She will need a urine dip. Ozempic dc'rukhsana. We can try oral metformin. The dose is 500 mg BID, take with meals. Can you call her pharmacy to see if they rec'd the order for the meter? If not, please pend what she needs. Thank you!

## 2023-07-20 ENCOUNTER — TELEPHONE (OUTPATIENT)
Dept: FAMILY MEDICINE CLINIC | Age: 68
End: 2023-07-20

## 2023-07-20 RX ORDER — BLOOD-GLUCOSE METER
1 EACH MISCELLANEOUS 3 TIMES DAILY
Qty: 1 KIT | Refills: 0 | OUTPATIENT
Start: 2023-07-20

## 2023-07-20 RX ORDER — BLOOD-GLUCOSE METER
1 EACH MISCELLANEOUS
Qty: 1 KIT | Refills: 0 | Status: SHIPPED | OUTPATIENT
Start: 2023-07-20

## 2023-07-20 NOTE — TELEPHONE ENCOUNTER
Called patient and she has been informed. Patient states that she does not need an appointment now for the bladder infection. Patient states that she had some antibiotics left over from another time she had them. She has been taking them now. Called patients pharmacy and they did not get the Glucose machine order. I have re-pended it.

## 2023-07-20 NOTE — TELEPHONE ENCOUNTER
Accu check Savanna Plus meters are no longer made per [facility] pharmacy, will need to send in for a different meter. Script pending.

## 2023-07-21 ENCOUNTER — HOSPITAL ENCOUNTER (OUTPATIENT)
Dept: CARDIOLOGY | Age: 68
Discharge: HOME | End: 2023-07-21
Payer: MEDICARE

## 2023-07-21 ENCOUNTER — TELEPHONE (OUTPATIENT)
Dept: FAMILY MEDICINE CLINIC | Age: 68
End: 2023-07-21

## 2023-07-21 DIAGNOSIS — I10 ESSENTIAL HYPERTENSION: ICD-10-CM

## 2023-07-21 DIAGNOSIS — R06.02 SOB (SHORTNESS OF BREATH) ON EXERTION: ICD-10-CM

## 2023-07-21 DIAGNOSIS — I50.32 CHRONIC DIASTOLIC CONGESTIVE HEART FAILURE (HCC): ICD-10-CM

## 2023-07-21 LAB
LV EF: 58 %
LVEF MODALITY: NORMAL

## 2023-07-21 PROCEDURE — 3430000000 HC RX DIAGNOSTIC RADIOPHARMACEUTICAL: Performed by: INTERNAL MEDICINE

## 2023-07-21 PROCEDURE — A9502 TC99M TETROFOSMIN: HCPCS | Performed by: INTERNAL MEDICINE

## 2023-07-21 PROCEDURE — 93306 TTE W/DOPPLER COMPLETE: CPT

## 2023-07-21 RX ADMIN — TETROFOSMIN 29 MILLICURIE: 1.38 INJECTION, POWDER, LYOPHILIZED, FOR SOLUTION INTRAVENOUS at 08:08

## 2023-07-21 NOTE — TELEPHONE ENCOUNTER
Pharmacy calling to clarify medication order/prescription:    Pharmacy:  [facility]     Medication:  Accu chek guide monitor system    Si kit by Does not apply route every morning (before breakfast    Quantity: 1.0    Comments: Accu chek wesley plus is no longer made, asking to switch to a different meter. Pharmacy did not give alternative options on fax.

## 2023-07-24 ENCOUNTER — TELEPHONE (OUTPATIENT)
Dept: CARDIOLOGY CLINIC | Age: 68
End: 2023-07-24

## 2023-07-24 NOTE — TELEPHONE ENCOUNTER
----- Message from Rodger Alcala MD sent at 7/21/2023  4:27 PM EDT -----  Normal ECHO with good heart strength. No concerning findings to explain SOB. Good news.  cpm

## 2023-07-25 ENCOUNTER — TELEPHONE (OUTPATIENT)
Dept: CARDIOLOGY CLINIC | Age: 68
End: 2023-07-25

## 2023-07-25 ENCOUNTER — HOSPITAL ENCOUNTER (OUTPATIENT)
Dept: CARDIOLOGY | Age: 68
Discharge: HOME | End: 2023-07-25
Payer: MEDICARE

## 2023-07-25 ENCOUNTER — PATIENT MESSAGE (OUTPATIENT)
Dept: CARDIOLOGY CLINIC | Age: 68
End: 2023-07-25

## 2023-07-25 DIAGNOSIS — R06.02 SOB (SHORTNESS OF BREATH) ON EXERTION: Primary | ICD-10-CM

## 2023-07-25 LAB
LV EF: 60 %
LVEF MODALITY: NORMAL

## 2023-07-25 PROCEDURE — 93017 CV STRESS TEST TRACING ONLY: CPT

## 2023-07-25 PROCEDURE — 78452 HT MUSCLE IMAGE SPECT MULT: CPT

## 2023-07-25 PROCEDURE — 6360000002 HC RX W HCPCS: Performed by: INTERNAL MEDICINE

## 2023-07-25 PROCEDURE — A9502 TC99M TETROFOSMIN: HCPCS | Performed by: INTERNAL MEDICINE

## 2023-07-25 PROCEDURE — 3430000000 HC RX DIAGNOSTIC RADIOPHARMACEUTICAL: Performed by: INTERNAL MEDICINE

## 2023-07-25 RX ORDER — REGADENOSON 0.08 MG/ML
0.4 INJECTION, SOLUTION INTRAVENOUS
Status: COMPLETED | OUTPATIENT
Start: 2023-07-25 | End: 2023-07-25

## 2023-07-25 RX ADMIN — REGADENOSON 0.4 MG: 0.08 INJECTION, SOLUTION INTRAVENOUS at 09:00

## 2023-07-25 RX ADMIN — TETROFOSMIN 30.4 MILLICURIE: 1.38 INJECTION, POWDER, LYOPHILIZED, FOR SOLUTION INTRAVENOUS at 09:00

## 2023-07-25 NOTE — PROGRESS NOTES
Pt did not feel would be able to meet her THR (safely) on the TM. A Lexiscan Myoview stress test was completed on this Pt. The Pt tolerated the test well.

## 2023-07-25 NOTE — TELEPHONE ENCOUNTER
----- Message from Mario Haile MD sent at 7/25/2023 12:25 PM EDT -----  Normal nuc stress test without evidence for significant heart artery blockages. Good news.  cpm

## 2023-07-26 NOTE — TELEPHONE ENCOUNTER
Yes, please refer to Anil Sampson or [facility] for formal pulmonary evaluation. Please let her know that is my recommendation.

## 2023-08-29 ENCOUNTER — TELEPHONE (OUTPATIENT)
Dept: PULMONOLOGY | Age: 68
End: 2023-08-29

## 2023-09-08 ENCOUNTER — TELEMEDICINE (OUTPATIENT)
Dept: PULMONOLOGY | Age: 68
End: 2023-09-08
Payer: MEDICARE

## 2023-09-08 DIAGNOSIS — G47.21 DELAYED SLEEP PHASE SYNDROME: ICD-10-CM

## 2023-09-08 DIAGNOSIS — G47.33 MILD OBSTRUCTIVE SLEEP APNEA: Primary | ICD-10-CM

## 2023-09-08 DIAGNOSIS — I10 ESSENTIAL HYPERTENSION: ICD-10-CM

## 2023-09-08 DIAGNOSIS — E66.9 OBESITY (BMI 30-39.9): ICD-10-CM

## 2023-09-08 DIAGNOSIS — R53.83 OTHER FATIGUE: ICD-10-CM

## 2023-09-08 DIAGNOSIS — Z71.89 CPAP USE COUNSELING: ICD-10-CM

## 2023-09-08 DIAGNOSIS — Z72.821 POOR SLEEP HYGIENE: ICD-10-CM

## 2023-09-08 PROCEDURE — 3017F COLORECTAL CA SCREEN DOC REV: CPT | Performed by: NURSE PRACTITIONER

## 2023-09-08 PROCEDURE — 99214 OFFICE O/P EST MOD 30 MIN: CPT | Performed by: NURSE PRACTITIONER

## 2023-09-08 PROCEDURE — 1090F PRES/ABSN URINE INCON ASSESS: CPT | Performed by: NURSE PRACTITIONER

## 2023-09-08 PROCEDURE — 1123F ACP DISCUSS/DSCN MKR DOCD: CPT | Performed by: NURSE PRACTITIONER

## 2023-09-08 PROCEDURE — G8427 DOCREV CUR MEDS BY ELIG CLIN: HCPCS | Performed by: NURSE PRACTITIONER

## 2023-09-08 PROCEDURE — G8399 PT W/DXA RESULTS DOCUMENT: HCPCS | Performed by: NURSE PRACTITIONER

## 2023-09-08 ASSESSMENT — SLEEP AND FATIGUE QUESTIONNAIRES
HOW LIKELY ARE YOU TO NOD OFF OR FALL ASLEEP WHILE SITTING QUIETLY AFTER LUNCH WITHOUT ALCOHOL: 0
HOW LIKELY ARE YOU TO NOD OFF OR FALL ASLEEP WHILE SITTING INACTIVE IN A PUBLIC PLACE: 0
HOW LIKELY ARE YOU TO NOD OFF OR FALL ASLEEP WHEN YOU ARE A PASSENGER IN A CAR FOR AN HOUR WITHOUT A BREAK: 0
HOW LIKELY ARE YOU TO NOD OFF OR FALL ASLEEP WHILE LYING DOWN TO REST IN THE AFTERNOON WHEN CIRCUMSTANCES PERMIT: 2
HOW LIKELY ARE YOU TO NOD OFF OR FALL ASLEEP WHILE SITTING AND TALKING TO SOMEONE: 0
HOW LIKELY ARE YOU TO NOD OFF OR FALL ASLEEP WHILE SITTING AND READING: 0
ESS TOTAL SCORE: 2
HOW LIKELY ARE YOU TO NOD OFF OR FALL ASLEEP WHILE WATCHING TV: 0
HOW LIKELY ARE YOU TO NOD OFF OR FALL ASLEEP IN A CAR, WHILE STOPPED FOR A FEW MINUTES IN TRAFFIC: 0

## 2023-09-08 NOTE — PROGRESS NOTES
Grandfather     Other Paternal Grandmother         Alpha 1 antitrypson deficiency    Cirrhosis Paternal Grandmother         Non alcoholic    Early Death Paternal Grandmother         Cirrhosis; likely from Alpha One    Diabetes Paternal Aunt     Immune Disorder Sister         MS    Miscarriages / Stillbirths Sister     Substance Abuse Maternal Uncle         Alcoholism    Other Paternal Uncle         Alpha One (Antitrypsin Deficiency)    Asthma Neg Hx     Birth Defects Neg Hx     Depression Neg Hx     High Cholesterol Neg Hx     Learning Disabilities Neg Hx     Mental Illness Neg Hx     Mental Retardation Neg Hx        Current Medications:    Current Outpatient Medications:     Semaglutide,0.25 or 0.5MG/DOS, (OZEMPIC, 0.25 OR 0.5 MG/DOSE,) 2 MG/1.5ML SOPN, Inject into the skin, Disp: , Rfl:     Blood Glucose Monitoring Suppl (ACCU-CHEK GUIDE) w/Device KIT, 1 kit by Does not apply route every morning (before breakfast), Disp: 1 kit, Rfl: 0    Blood Glucose Monitoring Suppl (CVS BLOOD GLUCOSE METER) w/Device KIT, 1 Device by Does not apply route in the morning, at noon, and at bedtime, Disp: 1 kit, Rfl: 0    Blood Glucose Monitoring Suppl (ACCU-CHEK XIOMARA PLUS) w/Device KIT, 1 kit by Does not apply route every morning (before breakfast), Disp: 1 kit, Rfl: 0    metFORMIN (GLUCOPHAGE) 500 MG tablet, Take 1 tablet by mouth 2 times daily (with meals), Disp: 180 tablet, Rfl: 0    blood glucose test strips (ACCU-CHEK XIOMARA PLUS) strip, 1 each by In Vitro route daily As needed. , Disp: 100 each, Rfl: 3    Accu-Chek FastClix Lancets MISC, 1 each by Does not apply route every morning (before breakfast), Disp: 102 each, Rfl: 1    doxazosin (CARDURA) 4 MG tablet, Take 1 tablet by mouth nightly, Disp: 90 tablet, Rfl: 3    losartan (COZAAR) 100 MG tablet, Take 1 tablet by mouth daily, Disp: 90 tablet, Rfl: 3    furosemide (LASIX) 40 MG tablet, TAKE 1 TABLET BY MOUTH EVERY DAY, Disp: 90 tablet, Rfl: 3    Calcium-Magnesium-Zinc

## 2023-10-04 ENCOUNTER — HOSPITAL ENCOUNTER (OUTPATIENT)
Age: 68
Discharge: HOME | End: 2023-10-04
Payer: MEDICARE

## 2023-10-04 ENCOUNTER — OFFICE VISIT (OUTPATIENT)
Dept: PULMONOLOGY | Age: 68
End: 2023-10-04
Payer: MEDICARE

## 2023-10-04 ENCOUNTER — HOSPITAL ENCOUNTER (OUTPATIENT)
Dept: GENERAL RADIOLOGY | Age: 68
Discharge: HOME | End: 2023-10-04
Payer: MEDICARE

## 2023-10-04 VITALS
OXYGEN SATURATION: 97 % | WEIGHT: 234 LBS | BODY MASS INDEX: 36.73 KG/M2 | HEART RATE: 74 BPM | DIASTOLIC BLOOD PRESSURE: 82 MMHG | HEIGHT: 67 IN | SYSTOLIC BLOOD PRESSURE: 122 MMHG

## 2023-10-04 DIAGNOSIS — J30.2 SEASONAL ALLERGIES: ICD-10-CM

## 2023-10-04 DIAGNOSIS — R06.09 DOE (DYSPNEA ON EXERTION): ICD-10-CM

## 2023-10-04 DIAGNOSIS — R06.09 DOE (DYSPNEA ON EXERTION): Primary | ICD-10-CM

## 2023-10-04 DIAGNOSIS — G47.33 MILD OBSTRUCTIVE SLEEP APNEA: ICD-10-CM

## 2023-10-04 PROCEDURE — 3079F DIAST BP 80-89 MM HG: CPT | Performed by: INTERNAL MEDICINE

## 2023-10-04 PROCEDURE — 82785 ASSAY OF IGE: CPT

## 2023-10-04 PROCEDURE — 1123F ACP DISCUSS/DSCN MKR DOCD: CPT | Performed by: INTERNAL MEDICINE

## 2023-10-04 PROCEDURE — G8427 DOCREV CUR MEDS BY ELIG CLIN: HCPCS | Performed by: INTERNAL MEDICINE

## 2023-10-04 PROCEDURE — 71046 X-RAY EXAM CHEST 2 VIEWS: CPT

## 2023-10-04 PROCEDURE — G8399 PT W/DXA RESULTS DOCUMENT: HCPCS | Performed by: INTERNAL MEDICINE

## 2023-10-04 PROCEDURE — 1036F TOBACCO NON-USER: CPT | Performed by: INTERNAL MEDICINE

## 2023-10-04 PROCEDURE — 3074F SYST BP LT 130 MM HG: CPT | Performed by: INTERNAL MEDICINE

## 2023-10-04 PROCEDURE — G8484 FLU IMMUNIZE NO ADMIN: HCPCS | Performed by: INTERNAL MEDICINE

## 2023-10-04 PROCEDURE — 3017F COLORECTAL CA SCREEN DOC REV: CPT | Performed by: INTERNAL MEDICINE

## 2023-10-04 PROCEDURE — G8417 CALC BMI ABV UP PARAM F/U: HCPCS | Performed by: INTERNAL MEDICINE

## 2023-10-04 PROCEDURE — 99214 OFFICE O/P EST MOD 30 MIN: CPT | Performed by: INTERNAL MEDICINE

## 2023-10-04 PROCEDURE — 86003 ALLG SPEC IGE CRUDE XTRC EA: CPT

## 2023-10-04 PROCEDURE — 1090F PRES/ABSN URINE INCON ASSESS: CPT | Performed by: INTERNAL MEDICINE

## 2023-10-04 RX ORDER — ALBUTEROL SULFATE 90 UG/1
2 AEROSOL, METERED RESPIRATORY (INHALATION) 4 TIMES DAILY PRN
Qty: 54 G | Refills: 5 | Status: SHIPPED | OUTPATIENT
Start: 2023-10-04

## 2023-10-04 NOTE — PROGRESS NOTES
[facility] Pulmonary, Critical Care and Sleep Specialists                                                                  CHIEF COMPLAINT: SOB       HPI:   SOB with any exertion, even washing the dishes has to sit down for a while  Severe at times. No associated cough or wheezes   Some seasonal allergy to ragweed - itchy nose, eyes, runny nose, little cough clearing her throat. No recurrent infections in her lungs  ? Black mold outside her house   No significant smoking   Seen by cardiology  Uses her CPAp every night. Mask and pressure are good. Past Medical History:   Diagnosis Date    Acquired hypothyroidism 01/06/2016    Adjustment reaction with anxiety and depression 10/19/2015    Chronic diastolic congestive heart failure ([address]) 09/30/2019    Chronic kidney disease     Depression     Diabetes mellitus ([address])     Essential hypertension 10/19/2015    Gastroesophageal reflux disease without esophagitis 10/19/2015    Hiatal hernia     Hypertension     Kidney failure     stage 3A    Obesity, Class II, BMI 35-39.9, with comorbidity 10/19/2015    Sleep apnea     Type 2 diabetes mellitus without complication ([address])     Urinary incontinence        Past Surgical History:        Procedure Laterality Date    COLONOSCOPY  2005    COLONOSCOPY      EGD COLONOSCOPY  01/09/2020    EGD ESOPHAGOGASTRODUODENOSCOPY DILATATION performed by Mk Phelps MD at [address] [place] [address] ([facility])      HYSTERECTOMY, TOTAL ABDOMINAL (CERVIX REMOVED)  2000    Ovaries intact    PARTIAL HYSTERECTOMY (CERVIX NOT REMOVED)  2000    due to Fibroid    UPPER GASTROINTESTINAL ENDOSCOPY N/A 01/09/2020    EGD BIOPSY performed by Mk Phelps MD at [address] [place] [address]  1/9/2020       Allergies:  is allergic to sulfa antibiotics. Social History:    TOBACCO:   reports that she has never smoked.  She has never used smokeless tobacco.  ETOH:   reports

## 2023-10-12 LAB
BARLEY IGE QN: <0.1
BEEF IGE QN: <0.1
BELL PEPPER IGE QN: <0.1
CABBAGE IGE QN: <0.1
CARROT IGE QN: <0.1
CHICKEN SERUM PROT IGE QN: <0.1
CODFISH IGE QN: <0.1
CORN IGE QN: <0.1
COW MILK IGE QN: <0.1
CRAB IGE QN: <0.1
EGG WHITE IGE QN: <0.1
GRAPE IGE QN: <0.1
LETTUCE IGE QN: <0.1
OAT IGE QN: <0.1
ORANGE IGE QN: <0.1
PEANUT IGE QN: <0.1
PORK IGE QN: <0.1
POTATO IGE QN: <0.1
RICE IGE QN: <0.1
RYE IGE QN: <0.1
SHRIMP IGE QN: <0.1
SOYBEAN IGE QN: <0.1
TOMATO IGE QN: <0.1
TUNA IGE QN: <0.1
WHEAT IGE QN: <0.1
WHITE BEAN IGE QN: <0.1

## 2023-10-13 LAB
A ALTERNATA IGE QN: <0.1 KU/L (ref 0–0.34)
A FUMIGATUS IGE QN: <0.1 KU/L (ref 0–0.34)
AMER SYCAMORE IGE QN: <0.1 KU/L (ref 0–0.34)
BERMUDA GRASS IGE QN: <0.1 KU/L (ref 0–0.34)
BOXELDER IGE QN: <0.1 KU/L (ref 0–0.34)
C SPHAEROSPERMUM IGE QN: <0.1 KUL/L (ref 0–0.34)
CALIF WALNUT IGE QN: 0.44 KU/L (ref 0–0.34)
CAT DANDER IGE QN: <0.1 KU/L (ref 0–0.34)
CMN PIGWEED IGE QN: <0.1 KU/L (ref 0–0.34)
COMMON RAGWEED IGE QN: <0.1 KU/L (ref 0–0.34)
COTTONWOOD IGE QN: <0.1 KU/L (ref 0–0.34)
D FARINAE IGE QN: 1.13 KU/L (ref 0–0.34)
D PTERONYSS IGE QN: 0.92 KU/L (ref 0–0.34)
DOG DANDER IGE QN: <0.1 KU/L (ref 0–0.34)
IGE SERPL-ACNC: 22 IU/ML
M RACEMOSUS IGE QN: <0.1 KU/L (ref 0–0.34)
MOUSE EPITH IGE QN: <0.1 KU/L (ref 0–0.34)
P NOTATUM IGE QN: <0.1 KU/L (ref 0–0.34)
PECAN/HICK TREE IGE QN: 0.38 KU/L (ref 0–0.34)
RED CEDAR IGE QN: <0.1 KU/L (ref 0–0.34)
ROACH IGE QN: <0.1 KU/L (ref 0–0.34)
SALTWORT IGE QN: <0.1 KU/L (ref 0–0.34)
SHEEP SORREL IGE QN: <0.1 KU/L (ref 0–0.34)
SILVER BIRCH IGE QN: <0.1 KU/L (ref 0–0.34)
TIMOTHY IGE QN: <0.1 KU/L (ref 0–0.34)
WHITE ASH IGE QN: <0.1 KU/L (ref 0–0.34)
WHITE ELM IGE QN: <0.1 KU/L (ref 0–0.34)
WHITE MULBERRY IGE QN: <0.1 KU/L (ref 0–0.34)
WHITE OAK IGE QN: <0.1 KU/L (ref 0–0.34)

## 2023-10-17 ENCOUNTER — OFFICE VISIT (OUTPATIENT)
Dept: CARDIOLOGY CLINIC | Age: 68
End: 2023-10-17
Payer: MEDICARE

## 2023-10-17 VITALS
HEIGHT: 67 IN | OXYGEN SATURATION: 98 % | WEIGHT: 234.2 LBS | BODY MASS INDEX: 36.76 KG/M2 | HEART RATE: 61 BPM | SYSTOLIC BLOOD PRESSURE: 128 MMHG | DIASTOLIC BLOOD PRESSURE: 62 MMHG

## 2023-10-17 DIAGNOSIS — I50.32 CHRONIC DIASTOLIC CONGESTIVE HEART FAILURE (HCC): ICD-10-CM

## 2023-10-17 DIAGNOSIS — I10 ESSENTIAL HYPERTENSION: Primary | ICD-10-CM

## 2023-10-17 DIAGNOSIS — G47.33 MILD OBSTRUCTIVE SLEEP APNEA: ICD-10-CM

## 2023-10-17 DIAGNOSIS — R06.02 SOB (SHORTNESS OF BREATH): ICD-10-CM

## 2023-10-17 PROCEDURE — G8417 CALC BMI ABV UP PARAM F/U: HCPCS | Performed by: INTERNAL MEDICINE

## 2023-10-17 PROCEDURE — 1090F PRES/ABSN URINE INCON ASSESS: CPT | Performed by: INTERNAL MEDICINE

## 2023-10-17 PROCEDURE — 3017F COLORECTAL CA SCREEN DOC REV: CPT | Performed by: INTERNAL MEDICINE

## 2023-10-17 PROCEDURE — 3074F SYST BP LT 130 MM HG: CPT | Performed by: INTERNAL MEDICINE

## 2023-10-17 PROCEDURE — G8427 DOCREV CUR MEDS BY ELIG CLIN: HCPCS | Performed by: INTERNAL MEDICINE

## 2023-10-17 PROCEDURE — 1036F TOBACCO NON-USER: CPT | Performed by: INTERNAL MEDICINE

## 2023-10-17 PROCEDURE — 3078F DIAST BP <80 MM HG: CPT | Performed by: INTERNAL MEDICINE

## 2023-10-17 PROCEDURE — G8399 PT W/DXA RESULTS DOCUMENT: HCPCS | Performed by: INTERNAL MEDICINE

## 2023-10-17 PROCEDURE — 1123F ACP DISCUSS/DSCN MKR DOCD: CPT | Performed by: INTERNAL MEDICINE

## 2023-10-17 PROCEDURE — G8484 FLU IMMUNIZE NO ADMIN: HCPCS | Performed by: INTERNAL MEDICINE

## 2023-10-17 PROCEDURE — 99214 OFFICE O/P EST MOD 30 MIN: CPT | Performed by: INTERNAL MEDICINE

## 2023-10-17 NOTE — PROGRESS NOTES
[facility]   Cardiac Followup    Referring Provider:  DEE Mauro CNP     Chief Complaint   Patient presents with    3 Month Follow-Up    Hypertension    Congestive Heart Failure    Shortness of Breath    Fatigue      She reestablished care with me in June 2023. Last OV prior was 11/16/20. Subjective: Ms Brad Nevarez is here for cardiology follow up; c/o SOB, allergies    History of Present Illness:   Ms. Brad Nevarez is a 76 y.o. female here to  She has PMH HTN, chronic diastolic CHF, Hypothyroidism, GERD, mild VANESSA on CPAP. She saw my partner Dr. Mitzy Magana as new pt 5/20/19 for HTN, TATUM, and abnormal EKG. EKG 5/20/19  marked SB 47 bpm; LAE; NST abnormality. Stress ECHO 6/4/19 Significant hypertensive response to exercise. Significant LVH on baseline echo. Near cavity obliteration at peak stress but otherwise normal stress ECHO without ischemia. EF 60%. Sleep study 6/27/19 revealed Mild VANESSA. At [facility] 10/1/19 she c/o fatigue so I reduced Toprol XL from 50 to 25mg daily and this improved. No longer takes Toprol now. EKG 6/29/23 SR; Left axis 64 bpm.    Echo 7/21/23  EF=55-60%. mild cLVH. Trace MR and TR. Vimal Montoya 7/25/23 unremarkable with no clear evidence of significant myocardial ischemia or scar within limits of study. Today, she went to see Dr. Opal Charles and he ordered lung testing with f/u OV 12/4 at his office. She had allergy testing done and reports being allergic to dust mites. She has dogs and they brink dust in and out. Patient reports SOB for which has bothered her for years but denies current edema, chest pain, sob, palpitations, dizziness or syncope. Patient is taking all cardiac medications as prescribed and tolerates them well. I used to see her  who passed away in 2016. Followed with Dr. Marcela Jackman who prior stopped HCTZ and told to avoid NSAIDs. She lives at home with her 16year old dog named Summer.      Weight today is 234# (down 12# from 6/2023)    Past Medical History:

## 2023-10-17 NOTE — PATIENT INSTRUCTIONS
Plan:  Labs reviewed in epic and discussed with patient. Current medications reviewed. Refills given as warranted. Recommend continuing to finish all of Dr. Hess Mercury testing.  -call and let me know what all of his testing shows when you are finished.  -if Dr. Zonia Landrum can't find any answers then we can consider doing a heart cath to look further into your heart and make sure the shortness of breath is not caused by the heart. No cardiac testing at this time. Follow up with me in 6 months, call in December to make your next appointment.

## 2023-11-01 ENCOUNTER — HOSPITAL ENCOUNTER (OUTPATIENT)
Dept: PULMONOLOGY | Age: 68
Discharge: HOME | End: 2023-11-01
Payer: MEDICARE

## 2023-11-01 ENCOUNTER — APPOINTMENT (OUTPATIENT)
Dept: CT IMAGING | Age: 68
End: 2023-11-01
Payer: MEDICARE

## 2023-11-01 ENCOUNTER — APPOINTMENT (OUTPATIENT)
Dept: GENERAL RADIOLOGY | Age: 68
End: 2023-11-01
Payer: MEDICARE

## 2023-11-01 ENCOUNTER — HOSPITAL ENCOUNTER (EMERGENCY)
Age: 68
Discharge: HOME | End: 2023-11-01
Attending: EMERGENCY MEDICINE
Payer: MEDICARE

## 2023-11-01 VITALS
BODY MASS INDEX: 36.1 KG/M2 | WEIGHT: 230 LBS | SYSTOLIC BLOOD PRESSURE: 143 MMHG | RESPIRATION RATE: 16 BRPM | HEART RATE: 78 BPM | OXYGEN SATURATION: 97 % | DIASTOLIC BLOOD PRESSURE: 79 MMHG | HEIGHT: 67 IN | TEMPERATURE: 98.4 F

## 2023-11-01 VITALS — OXYGEN SATURATION: 96 %

## 2023-11-01 DIAGNOSIS — R42 DIZZINESS: ICD-10-CM

## 2023-11-01 DIAGNOSIS — R06.09 DOE (DYSPNEA ON EXERTION): ICD-10-CM

## 2023-11-01 DIAGNOSIS — R06.09 DYSPNEA ON EXERTION: Primary | ICD-10-CM

## 2023-11-01 LAB
ALBUMIN SERPL-MCNC: 4.5 G/DL (ref 3.4–5)
ALBUMIN/GLOB SERPL: 1.8 {RATIO} (ref 1.1–2.2)
ALP SERPL-CCNC: 113 U/L (ref 40–129)
ALT SERPL-CCNC: 19 U/L (ref 10–40)
ANION GAP SERPL CALCULATED.3IONS-SCNC: 12 MMOL/L (ref 3–16)
AST SERPL-CCNC: 17 U/L (ref 15–37)
BASOPHILS # BLD: 0.1 K/UL (ref 0–0.2)
BASOPHILS NFR BLD: 1.1 %
BILIRUB SERPL-MCNC: 0.6 MG/DL (ref 0–1)
BILIRUB UR QL STRIP.AUTO: NEGATIVE
BUN SERPL-MCNC: 19 MG/DL (ref 7–20)
CALCIUM SERPL-MCNC: 9.8 MG/DL (ref 8.3–10.6)
CHLORIDE SERPL-SCNC: 99 MMOL/L (ref 99–110)
CLARITY UR: CLEAR
CO2 SERPL-SCNC: 26 MMOL/L (ref 21–32)
COLOR UR: YELLOW
CREAT SERPL-MCNC: 1.2 MG/DL (ref 0.6–1.2)
D DIMER: 0.37 UG/ML FEU (ref 0–0.6)
DEPRECATED RDW RBC AUTO: 13.9 % (ref 12.4–15.4)
DLCO %PRED: 119 %
DLCO PRED: NORMAL
DLCO/VA %PRED: NORMAL
DLCO/VA PRED: NORMAL
DLCO/VA: NORMAL
DLCO: NORMAL
EKG ATRIAL RATE: 75 BPM
EKG DIAGNOSIS: NORMAL
EKG P AXIS: 47 DEGREES
EKG P-R INTERVAL: 136 MS
EKG Q-T INTERVAL: 392 MS
EKG QRS DURATION: 90 MS
EKG QTC CALCULATION (BAZETT): 437 MS
EKG R AXIS: -36 DEGREES
EKG T AXIS: 6 DEGREES
EKG VENTRICULAR RATE: 75 BPM
EOSINOPHIL # BLD: 0.2 K/UL (ref 0–0.6)
EOSINOPHIL NFR BLD: 3.6 %
EXPIRATORY TIME-POST: NORMAL
EXPIRATORY TIME: NORMAL
FEF 25-75% %CHNG: NORMAL
FEF 25-75% %PRED-POST: NORMAL
FEF 25-75% %PRED-PRE: NORMAL
FEF 25-75% PRED: NORMAL
FEF 25-75%-POST: NORMAL
FEF 25-75%-PRE: NORMAL
FEV1 %PRED-POST: 105 %
FEV1 %PRED-PRE: 98 %
FEV1 PRED: NORMAL
FEV1-POST: NORMAL
FEV1-PRE: NORMAL
FEV1/FVC %PRED-POST: 100 %
FEV1/FVC %PRED-PRE: 95 %
FEV1/FVC PRED: NORMAL
FEV1/FVC-POST: NORMAL
FEV1/FVC-PRE: NORMAL
FVC %PRED-POST: 104 L
FVC %PRED-PRE: 102 %
FVC PRED: NORMAL
FVC-POST: NORMAL
FVC-PRE: NORMAL
GAW %PRED: NORMAL
GAW PRED: NORMAL
GAW: NORMAL
GFR SERPLBLD CREATININE-BSD FMLA CKD-EPI: 49 ML/MIN/{1.73_M2}
GLUCOSE SERPL-MCNC: 149 MG/DL (ref 70–99)
GLUCOSE UR STRIP.AUTO-MCNC: NEGATIVE MG/DL
HCT VFR BLD AUTO: 43.7 % (ref 36–48)
HGB BLD-MCNC: 14.5 G/DL (ref 12–16)
HGB UR QL STRIP.AUTO: NEGATIVE
IC %PRED: NORMAL
IC PRED: NORMAL
IC: NORMAL
KETONES UR STRIP.AUTO-MCNC: NEGATIVE MG/DL
LEUKOCYTE ESTERASE UR QL STRIP.AUTO: NEGATIVE
LYMPHOCYTES # BLD: 1.3 K/UL (ref 1–5.1)
LYMPHOCYTES NFR BLD: 19.3 %
MCH RBC QN AUTO: 29.9 PG (ref 26–34)
MCHC RBC AUTO-ENTMCNC: 33.2 G/DL (ref 31–36)
MCV RBC AUTO: 90 FL (ref 80–100)
MEP: NORMAL
MIP: NORMAL
MONOCYTES # BLD: 0.5 K/UL (ref 0–1.3)
MONOCYTES NFR BLD: 8 %
MVV %PRED-PRE: NORMAL
MVV PRED: NORMAL
MVV-PRE: NORMAL
NEUTROPHILS # BLD: 4.7 K/UL (ref 1.7–7.7)
NEUTROPHILS NFR BLD: 68 %
NITRITE UR QL STRIP.AUTO: NEGATIVE
NT-PROBNP SERPL-MCNC: 132 PG/ML (ref 0–124)
PEF %PRED-POST: NORMAL
PEF %PRED-PRE: NORMAL
PEF PRED: NORMAL
PEF%CHNG: NORMAL
PEF-POST: NORMAL
PEF-PRE: NORMAL
PH UR STRIP.AUTO: 6 [PH] (ref 5–8)
PLATELET # BLD AUTO: 172 K/UL (ref 135–450)
PMV BLD AUTO: 8.3 FL (ref 5–10.5)
POTASSIUM SERPL-SCNC: 3.6 MMOL/L (ref 3.5–5.1)
PROT SERPL-MCNC: 7 G/DL (ref 6.4–8.2)
PROT UR STRIP.AUTO-MCNC: NEGATIVE MG/DL
RAW %PRED: NORMAL
RAW PRED: NORMAL
RAW: NORMAL
RBC # BLD AUTO: 4.86 M/UL (ref 4–5.2)
RV %PRED: NORMAL
RV PRED: NORMAL
RV: NORMAL
SODIUM SERPL-SCNC: 137 MMOL/L (ref 136–145)
SP GR UR STRIP.AUTO: 1.01 (ref 1–1.03)
SVC %PRED: NORMAL
SVC PRED: NORMAL
SVC: NORMAL
TLC %PRED: 98 %
TLC PRED: NORMAL
TLC: NORMAL
TROPONIN, HIGH SENSITIVITY: 10 NG/L (ref 0–14)
TROPONIN, HIGH SENSITIVITY: 12 NG/L (ref 0–14)
UA COMPLETE W REFLEX CULTURE PNL UR: NORMAL
UA DIPSTICK W REFLEX MICRO PNL UR: NORMAL
URN SPEC COLLECT METH UR: NORMAL
UROBILINOGEN UR STRIP-ACNC: 0.2 E.U./DL
VA %PRED: NORMAL
VA PRED: NORMAL
VA: NORMAL
VTG %PRED: NORMAL
VTG PRED: NORMAL
VTG: NORMAL
WBC # BLD AUTO: 6.9 K/UL (ref 4–11)

## 2023-11-01 PROCEDURE — 70450 CT HEAD/BRAIN W/O DYE: CPT

## 2023-11-01 PROCEDURE — 6370000000 HC RX 637 (ALT 250 FOR IP): Performed by: INTERNAL MEDICINE

## 2023-11-01 PROCEDURE — 93010 ELECTROCARDIOGRAM REPORT: CPT | Performed by: INTERNAL MEDICINE

## 2023-11-01 PROCEDURE — 2580000003 HC RX 258: Performed by: PHYSICIAN ASSISTANT

## 2023-11-01 PROCEDURE — 85025 COMPLETE CBC W/AUTO DIFF WBC: CPT

## 2023-11-01 PROCEDURE — 83880 ASSAY OF NATRIURETIC PEPTIDE: CPT

## 2023-11-01 PROCEDURE — 99285 EMERGENCY DEPT VISIT HI MDM: CPT

## 2023-11-01 PROCEDURE — 84484 ASSAY OF TROPONIN QUANT: CPT

## 2023-11-01 PROCEDURE — 71046 X-RAY EXAM CHEST 2 VIEWS: CPT

## 2023-11-01 PROCEDURE — 81003 URINALYSIS AUTO W/O SCOPE: CPT

## 2023-11-01 PROCEDURE — 85379 FIBRIN DEGRADATION QUANT: CPT

## 2023-11-01 PROCEDURE — 36415 COLL VENOUS BLD VENIPUNCTURE: CPT

## 2023-11-01 PROCEDURE — 94726 PLETHYSMOGRAPHY LUNG VOLUMES: CPT

## 2023-11-01 PROCEDURE — 70498 CT ANGIOGRAPHY NECK: CPT

## 2023-11-01 PROCEDURE — 80053 COMPREHEN METABOLIC PANEL: CPT

## 2023-11-01 PROCEDURE — 94618 PULMONARY STRESS TESTING: CPT

## 2023-11-01 PROCEDURE — 94060 EVALUATION OF WHEEZING: CPT

## 2023-11-01 PROCEDURE — 94729 DIFFUSING CAPACITY: CPT

## 2023-11-01 PROCEDURE — 93005 ELECTROCARDIOGRAM TRACING: CPT | Performed by: PHYSICIAN ASSISTANT

## 2023-11-01 RX ORDER — ALBUTEROL SULFATE 90 UG/1
4 AEROSOL, METERED RESPIRATORY (INHALATION) ONCE
Status: COMPLETED | OUTPATIENT
Start: 2023-11-01 | End: 2023-11-01

## 2023-11-01 RX ORDER — 0.9 % SODIUM CHLORIDE 0.9 %
1000 INTRAVENOUS SOLUTION INTRAVENOUS ONCE
Status: COMPLETED | OUTPATIENT
Start: 2023-11-01 | End: 2023-11-01

## 2023-11-01 RX ADMIN — SODIUM CHLORIDE 1000 ML: 9 INJECTION, SOLUTION INTRAVENOUS at 18:25

## 2023-11-01 RX ADMIN — Medication 4 PUFF: at 09:32

## 2023-11-01 ASSESSMENT — PULMONARY FUNCTION TESTS
FEV1/FVC_PERCENT_PREDICTED_POST: 100
FEV1/FVC_PERCENT_PREDICTED_PRE: 95
FVC_PERCENT_PREDICTED_POST: 104
FVC_PERCENT_PREDICTED_PRE: 102
FEV1_PERCENT_PREDICTED_PRE: 98
FEV1_PERCENT_PREDICTED_POST: 105

## 2023-11-01 ASSESSMENT — LIFESTYLE VARIABLES
HOW OFTEN DO YOU HAVE A DRINK CONTAINING ALCOHOL: NEVER
HOW MANY STANDARD DRINKS CONTAINING ALCOHOL DO YOU HAVE ON A TYPICAL DAY: PATIENT DOES NOT DRINK

## 2023-11-01 ASSESSMENT — PAIN - FUNCTIONAL ASSESSMENT: PAIN_FUNCTIONAL_ASSESSMENT: NONE - DENIES PAIN

## 2023-11-01 NOTE — ED NOTES
12 lead ECG completed. Given to Dr. Triplett for review.      [facility]  11/01/23 [id number]

## 2023-11-01 NOTE — ED PROVIDER NOTES
[address] [address] ED  EMERGENCY DEPARTMENT ENCOUNTER        Pt Name: Hafsa Lindo  MRN: [id number]  [address] [facility] 1955  Date of evaluation: 11/1/2023  Provider: David Castorena PA-C  PCP: DEE Newman CNP  Note Started: 4:38 PM EDT 11/1/23       I have seen and evaluated this patient with my supervising physician No att. providers found. CHIEF COMPLAINT       Chief Complaint   Patient presents with    Medication Reaction     States received 4 albuterol puffs during a pulmonary function tests and feels shes having a reaction to those. States immediately after 4 puffs, she felt short of breath. Pt also states \"my vision is off. The lights are bright\". Pt states symptoms haven't resolved in 4 hours so she presented here. HISTORY OF PRESENT ILLNESS: 1 or more Elements     History From: Patient            Chief Complaint: Dizziness and short of breath    Hafsa Lindo is a 76 y.o. female history of diabetes who presents with primary complaint of feeling short of breath with exertion and feeling dizzy. She has been dealing with dyspnea on exertion for many years now that is gradually worsening, she is seeing many specialists and is following closely with her regular doctor. She recently had a normal stress test in July with her cardiologist.  She tells me she does have heart failure. She is now seeing pulmonology and had a pulmonary function test this morning. She states the 6-minute walk test was quite a bit for her and she felt very short of breath. When she stood up she felt very dizzy like she was going to pass out, she is to have the symptoms as a child but never since then. She continued to feel like this off and on after her appointment. She did not eat anything for breakfast so she went to [facility] and continued to feel dizzy off and on and more short of breath than normal.  For the past months she started albuterol inhaler and has noticed an increased cough with this.

## 2023-11-02 NOTE — PROCEDURES
Pulmonary Function Testing      Patient name:  Lizeth Donald     [facility] Unit #:   [id number]   Date of test:  11/1/2023   Date of interpretation:   11/2/2023    Ms. Lizeth Donald is a 76y.o. year-old  The spirometry data were acceptable and reproducible. Spirometry:  Flow volume loops were normal. The FEV-1/FVC ratio was normal. The FEV-1 was 2.46 liters (98% of predicted), which was normal. The FVC was 3.31 liters (102% of predicted), which was normal. Response to inhaled bronchodilators (albuterol) was nonsignificant. Lung volumes:  Lung volumes were tested by plethysmography. The total lung capacity was 5.42 liters (98% of predicted), which was normal. The residual volume was 2.89 liters (125% of predicted), which was increased. Diffusion capacity was found to be 117% which is normal.      Interpretation:  Full PFT done. Spirometry shows no obstructive lung defect. There is no bronchodilator effect. Lung volumes do show air trapping but no hyperinflation or restrictive lung defect. Diffusion is normal even when adjusted for alveolar ventilation. Flow volume loops were normal          6-minute walk test    Patient started with a O2 saturation of 96% on room air at rest with a heart rate of 77 and a respiratory rate of 18. Dyspnea and fatigue modified Kevin scores of 0.5 and 0.5 respectively. Patient was able to complete the 6-minute walk test.  Patient did not have desaturation. Patient's lowest O2 saturation was 95%. Patient did not require oxygen. Impression  No desaturation with the lowest O2 saturation of 95%. Patient was able to complete 1320 feet, this was 102% of expected distance.   Symptoms at the end of the exercise some mild shortness of breath and hip pain

## 2023-11-02 NOTE — DISCHARGE INSTRUCTIONS
Please follow-up with your regular doctor regarding your shortness of breath and dizziness. If you develop worsening shortness of breath, chest pain, dizziness, headache please return here.

## 2023-11-14 LAB
CATARACTS: POSITIVE
DIABETIC RETINOPATHY: NEGATIVE
GLAUCOMA: NEGATIVE
INTRAOCULAR PRESSURE EYE: 19
INTRAOCULAR PRESSURE EYE: 20
VISUAL ACUITY DISTANCE LEFT EYE: NORMAL
VISUAL ACUITY DISTANCE RIGHT EYE: NORMAL

## 2023-12-04 ENCOUNTER — OFFICE VISIT (OUTPATIENT)
Dept: PULMONOLOGY | Age: 68
End: 2023-12-04
Payer: MEDICARE

## 2023-12-04 VITALS
DIASTOLIC BLOOD PRESSURE: 80 MMHG | OXYGEN SATURATION: 98 % | SYSTOLIC BLOOD PRESSURE: 126 MMHG | WEIGHT: 237 LBS | BODY MASS INDEX: 37.12 KG/M2 | HEART RATE: 66 BPM

## 2023-12-04 DIAGNOSIS — G47.33 MILD OBSTRUCTIVE SLEEP APNEA: ICD-10-CM

## 2023-12-04 DIAGNOSIS — R06.09 DOE (DYSPNEA ON EXERTION): Primary | ICD-10-CM

## 2023-12-04 DIAGNOSIS — J30.2 SEASONAL ALLERGIES: ICD-10-CM

## 2023-12-04 PROCEDURE — 3074F SYST BP LT 130 MM HG: CPT | Performed by: INTERNAL MEDICINE

## 2023-12-04 PROCEDURE — G8484 FLU IMMUNIZE NO ADMIN: HCPCS | Performed by: INTERNAL MEDICINE

## 2023-12-04 PROCEDURE — 1036F TOBACCO NON-USER: CPT | Performed by: INTERNAL MEDICINE

## 2023-12-04 PROCEDURE — G8399 PT W/DXA RESULTS DOCUMENT: HCPCS | Performed by: INTERNAL MEDICINE

## 2023-12-04 PROCEDURE — G8417 CALC BMI ABV UP PARAM F/U: HCPCS | Performed by: INTERNAL MEDICINE

## 2023-12-04 PROCEDURE — G8427 DOCREV CUR MEDS BY ELIG CLIN: HCPCS | Performed by: INTERNAL MEDICINE

## 2023-12-04 PROCEDURE — 99214 OFFICE O/P EST MOD 30 MIN: CPT | Performed by: INTERNAL MEDICINE

## 2023-12-04 PROCEDURE — 1123F ACP DISCUSS/DSCN MKR DOCD: CPT | Performed by: INTERNAL MEDICINE

## 2023-12-04 PROCEDURE — 3017F COLORECTAL CA SCREEN DOC REV: CPT | Performed by: INTERNAL MEDICINE

## 2023-12-04 PROCEDURE — 1090F PRES/ABSN URINE INCON ASSESS: CPT | Performed by: INTERNAL MEDICINE

## 2023-12-04 PROCEDURE — 3079F DIAST BP 80-89 MM HG: CPT | Performed by: INTERNAL MEDICINE

## 2023-12-04 NOTE — PROGRESS NOTES
[facility] Pulmonary, Critical Care and Sleep Specialists                                                                  CHIEF COMPLAINT: Follow up tests results         HPI:   Feels better overall  Uses Albuterol 4 times a day   Some cough with clear white sputum  No hemoptysis   Uses CPAP every night. Mask and pressure are good. Feels better when she uses it. Past Medical History:   Diagnosis Date    Acquired hypothyroidism 01/06/2016    Adjustment reaction with anxiety and depression 10/19/2015    Chronic diastolic congestive heart failure ([address]) 09/30/2019    Chronic kidney disease     Depression     Diabetes mellitus ([address])     Essential hypertension 10/19/2015    Gastroesophageal reflux disease without esophagitis 10/19/2015    Hiatal hernia     Hypertension     Kidney failure     stage 3A    Obesity, Class II, BMI 35-39.9, with comorbidity 10/19/2015    Sleep apnea     Type 2 diabetes mellitus without complication ([address])     Urinary incontinence        Past Surgical History:        Procedure Laterality Date    COLONOSCOPY  2005    COLONOSCOPY      EGD COLONOSCOPY  01/09/2020    EGD ESOPHAGOGASTRODUODENOSCOPY DILATATION performed by Shiraz Landrum MD at [address] [place] [address] ([facility])      HYSTERECTOMY, TOTAL ABDOMINAL (CERVIX REMOVED)  2000    Ovaries intact    PARTIAL HYSTERECTOMY (CERVIX NOT REMOVED)  2000    due to Fibroid    UPPER GASTROINTESTINAL ENDOSCOPY N/A 01/09/2020    EGD BIOPSY performed by Shiraz Landrum MD at [address] [place] [address]  1/9/2020       Allergies:  is allergic to sulfa antibiotics. Social History:    TOBACCO:   reports that she has never smoked. She has never used smokeless tobacco.  ETOH:   reports current alcohol use of about 1.0 standard drink of alcohol per week.       Family History:       Problem Relation Age of Onset    Heart Disease Mother         Afib    Alzheimer's Disease Mother

## 2024-10-07 DIAGNOSIS — I50.32 CHRONIC DIASTOLIC CONGESTIVE HEART FAILURE (HCC): ICD-10-CM

## 2024-10-07 DIAGNOSIS — Z79.899 MEDICATION MANAGEMENT: Primary | ICD-10-CM

## 2024-10-07 DIAGNOSIS — R06.02 SOB (SHORTNESS OF BREATH): ICD-10-CM

## 2024-10-07 DIAGNOSIS — E03.9 ACQUIRED HYPOTHYROIDISM: ICD-10-CM

## 2024-10-07 DIAGNOSIS — I10 ESSENTIAL HYPERTENSION: ICD-10-CM

## 2024-10-07 RX ORDER — LOSARTAN POTASSIUM 100 MG/1
100 TABLET ORAL DAILY
Qty: 90 TABLET | Refills: 0 | Status: SHIPPED | OUTPATIENT
Start: 2024-10-07

## 2024-10-07 RX ORDER — DOXAZOSIN 4 MG/1
4 TABLET ORAL NIGHTLY
Qty: 90 TABLET | Refills: 0 | Status: SHIPPED | OUTPATIENT
Start: 2024-10-07

## 2024-10-07 NOTE — TELEPHONE ENCOUNTER
Last OV 10/17/23 M  No upcoming OV  BMP 12/1/23      Plan:  Labs reviewed in epic and discussed with patient.  Current medications reviewed.  Refills given as warranted.  Recommend continuing to finish all of Dr. Sampson's testing.  -call and let me know what all of his testing shows when you are finished.  -if Dr. Sampson can't find any answers then we can consider doing a heart cath to look further into your heart and make sure the shortness of breath is not caused by the heart.  No cardiac testing at this time.     Follow up with me in 6 months, call in December to make your next appointment.    Will call pt at appropriate time to schedule OV.

## 2024-11-20 ENCOUNTER — PATIENT MESSAGE (OUTPATIENT)
Dept: CARDIOLOGY CLINIC | Age: 69
End: 2024-11-20

## 2024-11-20 DIAGNOSIS — I50.32 CHRONIC DIASTOLIC CONGESTIVE HEART FAILURE (HCC): ICD-10-CM

## 2024-11-20 DIAGNOSIS — I10 ESSENTIAL HYPERTENSION: ICD-10-CM

## 2024-11-20 RX ORDER — FUROSEMIDE 40 MG/1
TABLET ORAL
Qty: 90 TABLET | Refills: 3
Start: 2024-11-20

## 2024-11-20 NOTE — TELEPHONE ENCOUNTER
Stop lasix 40mg daily and only use PRN swelling/weight gain/SOB. See how she feels on cardura and losartan only. If still fatigued and lower BP then I would cut losartan in half from 100mg to 50mg qd and she how she does. Goal /85 or less.

## 2024-12-08 ENCOUNTER — HOSPITAL ENCOUNTER (OUTPATIENT)
Age: 69
Discharge: HOME | End: 2024-12-08
Payer: MEDICARE

## 2024-12-08 DIAGNOSIS — I50.32 CHRONIC DIASTOLIC CONGESTIVE HEART FAILURE (HCC): ICD-10-CM

## 2024-12-08 DIAGNOSIS — I10 ESSENTIAL HYPERTENSION: ICD-10-CM

## 2024-12-08 DIAGNOSIS — E03.9 ACQUIRED HYPOTHYROIDISM: ICD-10-CM

## 2024-12-08 DIAGNOSIS — R06.02 SOB (SHORTNESS OF BREATH): ICD-10-CM

## 2024-12-08 DIAGNOSIS — Z79.899 MEDICATION MANAGEMENT: ICD-10-CM

## 2024-12-08 LAB
ALBUMIN SERPL-MCNC: 3.8 G/DL (ref 3.4–5)
ALBUMIN/GLOB SERPL: 1.3 {RATIO} (ref 1.1–2.2)
ALP SERPL-CCNC: 98 U/L (ref 40–129)
ALT SERPL-CCNC: 26 U/L (ref 10–40)
ANION GAP SERPL CALCULATED.3IONS-SCNC: 9 MMOL/L (ref 3–16)
AST SERPL-CCNC: 24 U/L (ref 15–37)
BASOPHILS # BLD: 0.1 K/UL (ref 0–0.2)
BASOPHILS NFR BLD: 1.1 %
BILIRUB SERPL-MCNC: 1 MG/DL (ref 0–1)
BUN SERPL-MCNC: 16 MG/DL (ref 7–20)
CALCIUM SERPL-MCNC: 9.5 MG/DL (ref 8.3–10.6)
CHLORIDE SERPL-SCNC: 107 MMOL/L (ref 99–110)
CHOLEST SERPL-MCNC: 149 MG/DL (ref 0–199)
CO2 SERPL-SCNC: 24 MMOL/L (ref 21–32)
CREAT SERPL-MCNC: 1 MG/DL (ref 0.6–1.2)
DEPRECATED RDW RBC AUTO: 14.3 % (ref 12.4–15.4)
EOSINOPHIL # BLD: 0.1 K/UL (ref 0–0.6)
EOSINOPHIL NFR BLD: 3 %
GFR SERPLBLD CREATININE-BSD FMLA CKD-EPI: 61 ML/MIN/{1.73_M2}
GLUCOSE SERPL-MCNC: 116 MG/DL (ref 70–99)
HCT VFR BLD AUTO: 47 % (ref 36–48)
HDLC SERPL-MCNC: 32 MG/DL (ref 40–60)
HGB BLD-MCNC: 15.9 G/DL (ref 12–16)
LDLC SERPL CALC-MCNC: 99 MG/DL
LYMPHOCYTES # BLD: 1.2 K/UL (ref 1–5.1)
LYMPHOCYTES NFR BLD: 24.9 %
MCH RBC QN AUTO: 30.6 PG (ref 26–34)
MCHC RBC AUTO-ENTMCNC: 33.8 G/DL (ref 31–36)
MCV RBC AUTO: 90.5 FL (ref 80–100)
MONOCYTES # BLD: 0.3 K/UL (ref 0–1.3)
MONOCYTES NFR BLD: 7.2 %
NEUTROPHILS # BLD: 3 K/UL (ref 1.7–7.7)
NEUTROPHILS NFR BLD: 63.8 %
PLATELET # BLD AUTO: 173 K/UL (ref 135–450)
PMV BLD AUTO: 8.8 FL (ref 5–10.5)
POTASSIUM SERPL-SCNC: 4.1 MMOL/L (ref 3.5–5.1)
PROT SERPL-MCNC: 6.7 G/DL (ref 6.4–8.2)
RBC # BLD AUTO: 5.2 M/UL (ref 4–5.2)
SODIUM SERPL-SCNC: 140 MMOL/L (ref 136–145)
TRIGL SERPL-MCNC: 90 MG/DL (ref 0–150)
TSH SERPL DL<=0.005 MIU/L-ACNC: 1.54 UIU/ML (ref 0.27–4.2)
VLDLC SERPL CALC-MCNC: 18 MG/DL
WBC # BLD AUTO: 4.7 K/UL (ref 4–11)

## 2024-12-08 PROCEDURE — 80053 COMPREHEN METABOLIC PANEL: CPT

## 2024-12-08 PROCEDURE — 84443 ASSAY THYROID STIM HORMONE: CPT

## 2024-12-08 PROCEDURE — 36415 COLL VENOUS BLD VENIPUNCTURE: CPT

## 2024-12-08 PROCEDURE — 80061 LIPID PANEL: CPT

## 2024-12-08 PROCEDURE — 85025 COMPLETE CBC W/AUTO DIFF WBC: CPT

## 2024-12-09 ENCOUNTER — OFFICE VISIT (OUTPATIENT)
Dept: PULMONOLOGY | Age: 69
End: 2024-12-09
Payer: MEDICARE

## 2024-12-09 VITALS
OXYGEN SATURATION: 97 % | HEART RATE: 62 BPM | HEIGHT: 67 IN | WEIGHT: 242.2 LBS | RESPIRATION RATE: 106 BRPM | BODY MASS INDEX: 38.01 KG/M2 | DIASTOLIC BLOOD PRESSURE: 60 MMHG | SYSTOLIC BLOOD PRESSURE: 118 MMHG

## 2024-12-09 DIAGNOSIS — R06.09 DOE (DYSPNEA ON EXERTION): Primary | ICD-10-CM

## 2024-12-09 DIAGNOSIS — E66.01 MORBID (SEVERE) OBESITY DUE TO EXCESS CALORIES: ICD-10-CM

## 2024-12-09 DIAGNOSIS — G47.33 OSA (OBSTRUCTIVE SLEEP APNEA): ICD-10-CM

## 2024-12-09 PROCEDURE — G8484 FLU IMMUNIZE NO ADMIN: HCPCS | Performed by: INTERNAL MEDICINE

## 2024-12-09 PROCEDURE — G8399 PT W/DXA RESULTS DOCUMENT: HCPCS | Performed by: INTERNAL MEDICINE

## 2024-12-09 PROCEDURE — 3078F DIAST BP <80 MM HG: CPT | Performed by: INTERNAL MEDICINE

## 2024-12-09 PROCEDURE — 3017F COLORECTAL CA SCREEN DOC REV: CPT | Performed by: INTERNAL MEDICINE

## 2024-12-09 PROCEDURE — 99214 OFFICE O/P EST MOD 30 MIN: CPT | Performed by: INTERNAL MEDICINE

## 2024-12-09 PROCEDURE — 1036F TOBACCO NON-USER: CPT | Performed by: INTERNAL MEDICINE

## 2024-12-09 PROCEDURE — 1090F PRES/ABSN URINE INCON ASSESS: CPT | Performed by: INTERNAL MEDICINE

## 2024-12-09 PROCEDURE — 1159F MED LIST DOCD IN RCRD: CPT | Performed by: INTERNAL MEDICINE

## 2024-12-09 PROCEDURE — G8427 DOCREV CUR MEDS BY ELIG CLIN: HCPCS | Performed by: INTERNAL MEDICINE

## 2024-12-09 PROCEDURE — 3074F SYST BP LT 130 MM HG: CPT | Performed by: INTERNAL MEDICINE

## 2024-12-09 PROCEDURE — 1123F ACP DISCUSS/DSCN MKR DOCD: CPT | Performed by: INTERNAL MEDICINE

## 2024-12-09 PROCEDURE — G2211 COMPLEX E/M VISIT ADD ON: HCPCS | Performed by: INTERNAL MEDICINE

## 2024-12-09 PROCEDURE — G8417 CALC BMI ABV UP PARAM F/U: HCPCS | Performed by: INTERNAL MEDICINE

## 2024-12-09 RX ORDER — ASCORBIC ACID 500 MG
TABLET ORAL
COMMUNITY

## 2024-12-09 RX ORDER — OXYBUTYNIN CHLORIDE 10 MG/1
TABLET, EXTENDED RELEASE ORAL
COMMUNITY
Start: 2024-02-13

## 2024-12-09 ASSESSMENT — SLEEP AND FATIGUE QUESTIONNAIRES
HOW LIKELY ARE YOU TO NOD OFF OR FALL ASLEEP WHILE SITTING QUIETLY AFTER LUNCH WITHOUT ALCOHOL: HIGH CHANCE OF DOZING
HOW LIKELY ARE YOU TO NOD OFF OR FALL ASLEEP WHEN YOU ARE A PASSENGER IN A CAR FOR AN HOUR WITHOUT A BREAK: WOULD NEVER DOZE
HOW LIKELY ARE YOU TO NOD OFF OR FALL ASLEEP WHILE WATCHING TV: WOULD NEVER DOZE
HOW LIKELY ARE YOU TO NOD OFF OR FALL ASLEEP WHILE LYING DOWN TO REST IN THE AFTERNOON WHEN CIRCUMSTANCES PERMIT: HIGH CHANCE OF DOZING
ESS TOTAL SCORE: 9
HOW LIKELY ARE YOU TO NOD OFF OR FALL ASLEEP WHILE SITTING INACTIVE IN A PUBLIC PLACE: WOULD NEVER DOZE
HOW LIKELY ARE YOU TO NOD OFF OR FALL ASLEEP WHILE SITTING AND TALKING TO SOMEONE: WOULD NEVER DOZE
HOW LIKELY ARE YOU TO NOD OFF OR FALL ASLEEP IN A CAR, WHILE STOPPED FOR A FEW MINUTES IN TRAFFIC: WOULD NEVER DOZE
HOW LIKELY ARE YOU TO NOD OFF OR FALL ASLEEP WHILE SITTING AND READING: HIGH CHANCE OF DOZING

## 2024-12-09 NOTE — PATIENT INSTRUCTIONS
[facility] [phone]    
The procedure was performed independently

## 2024-12-09 NOTE — RESULT ENCOUNTER NOTE
Lorenzo Kulkarni MD  [facility] Cardio Practice Staff  Good labs. CPM. Lipids labs good at goal. No need for statin based on her numbers.    Patient informed and VU

## 2024-12-09 NOTE — PROGRESS NOTES
[facility] Pulmonary, Critical Care and Sleep Specialists                                                                  CHIEF COMPLAINT: Follow up  VANESSA        HPI:   Has not been using her CPAP. Sleeps on her side and feels mch better. Per her son still with snoring. Occasional daytime sleepiness, sleeps in her chair.   Not using her Albuterol   Back pain limits her activities   Still with SOB on minimal exertion, walk to the driveway   Intermittent R sided pain - random when sitting at times, not on exertion. Lasts < 1 min.       Past Medical History:   Diagnosis Date    Acquired hypothyroidism 01/06/2016    Adjustment reaction with anxiety and depression 10/19/2015    Chronic diastolic congestive heart failure (HCC) 09/30/2019    Chronic kidney disease     Depression     Diabetes mellitus ([facility])     Essential hypertension 10/19/2015    Gastroesophageal reflux disease without esophagitis 10/19/2015    Hiatal hernia     Hypertension     Kidney failure     stage 3A    Obesity, Class II, BMI 35-39.9, with comorbidity 10/19/2015    Sleep apnea     Type 2 diabetes mellitus without complication ([facility])     Urinary incontinence        Past Surgical History:        Procedure Laterality Date    COLONOSCOPY  2005    COLONOSCOPY      EGD COLONOSCOPY  01/09/2020    EGD ESOPHAGOGASTRODUODENOSCOPY DILATATION performed by Ephraim Pollard MD at [facility] ENDOSCOPY    HYSTERECTOMY (CERVIX STATUS UNKNOWN)      HYSTERECTOMY, TOTAL ABDOMINAL (CERVIX REMOVED)  2000    Ovaries intact    PARTIAL HYSTERECTOMY (CERVIX NOT REMOVED)  2000    due to Fibroid    UPPER GASTROINTESTINAL ENDOSCOPY N/A 01/09/2020    EGD BIOPSY performed by Ephraim Pollard MD at [facility] ENDOSCOPY    UPPER GASTROINTESTINAL ENDOSCOPY  1/9/2020       Allergies:  is allergic to sulfa antibiotics.  Social History:    TOBACCO:   reports that she has never smoked. She has never used smokeless tobacco.  ETOH:   reports current alcohol use of

## 2024-12-10 ENCOUNTER — TELEPHONE (OUTPATIENT)
Dept: PULMONOLOGY | Age: 69
End: 2024-12-10

## 2024-12-10 NOTE — TELEPHONE ENCOUNTER
Patient was calling to get advice on what other test needs scheduled with central scheduling. Patient called them and stated they were not sure what else needed scheduled.    I looked through chart and advise patient we would call her back to advise what other test needed scheduled.

## 2024-12-11 ENCOUNTER — PATIENT MESSAGE (OUTPATIENT)
Dept: PULMONOLOGY | Age: 69
End: 2024-12-11

## 2024-12-11 DIAGNOSIS — I26.99 PULMONARY EMBOLISM, UNSPECIFIED CHRONICITY, UNSPECIFIED PULMONARY EMBOLISM TYPE, UNSPECIFIED WHETHER ACUTE COR PULMONALE PRESENT (HCC): Primary | ICD-10-CM

## 2024-12-11 DIAGNOSIS — R79.89 ELEVATED D-DIMER: ICD-10-CM

## 2024-12-11 NOTE — TELEPHONE ENCOUNTER
The caridopulmonary test can only be done at  phone number is [phone]   Left message for Pt to return my phone call to the office left details with  phone number in the message

## 2024-12-13 ENCOUNTER — HOSPITAL ENCOUNTER (OUTPATIENT)
Dept: PULMONOLOGY | Age: 69
Discharge: HOME | End: 2024-12-13
Payer: MEDICARE

## 2024-12-13 VITALS — OXYGEN SATURATION: 98 %

## 2024-12-13 DIAGNOSIS — R06.09 DOE (DYSPNEA ON EXERTION): ICD-10-CM

## 2024-12-13 LAB
DLCO %PRED: 103 %
DLCO PRED: NORMAL
DLCO/VA %PRED: NORMAL
DLCO/VA PRED: NORMAL
DLCO/VA: NORMAL
DLCO: NORMAL
EXPIRATORY TIME-POST: NORMAL
EXPIRATORY TIME: NORMAL
FEF 25-75 %CHNG: NORMAL
FEF 25-75 POST %PRED: NORMAL
FEF 25-75% %PRED-PRE: NORMAL
FEF 25-75% PRED: NORMAL
FEF 25-75-POST: NORMAL
FEF 25-75-PRE: NORMAL
FEV1 %PRED-POST: 107 %
FEV1 %PRED-PRE: 104 %
FEV1 PRED: NORMAL
FEV1-POST: NORMAL
FEV1-PRE: NORMAL
FEV1/FVC %PRED-POST: 102 %
FEV1/FVC %PRED-PRE: 95 %
FEV1/FVC PRED: NORMAL
FEV1/FVC-POST: NORMAL
FEV1/FVC-PRE: NORMAL
FVC %PRED-POST: 103 L
FVC %PRED-PRE: 108 %
FVC PRED: NORMAL
FVC-POST: NORMAL
FVC-PRE: NORMAL
GAW %PRED: NORMAL
GAW PRED: NORMAL
GAW: NORMAL
IC PRE %PRED: NORMAL
IC PRED: NORMAL
IC: NORMAL
MEP: NORMAL
MIP: NORMAL
MVV %PRED-PRE: NORMAL
MVV PRED: NORMAL
MVV-PRE: NORMAL
PEF %PRED-POST: NORMAL
PEF %PRED-PRE: NORMAL
PEF PRED: NORMAL
PEF%CHNG: NORMAL
PEF-POST: NORMAL
PEF-PRE: NORMAL
RAW %PRED: NORMAL
RAW PRED: NORMAL
RAW: NORMAL
RV PRE %PRED: NORMAL
RV PRED: NORMAL
RV: NORMAL
SVC %PRED: NORMAL
SVC PRED: NORMAL
SVC: NORMAL
TLC PRE %PRED: 106 %
TLC PRED: NORMAL
TLC: NORMAL
VA %PRED: NORMAL
VA PRED: NORMAL
VA: NORMAL
VTG %PRED: NORMAL
VTG PRED: NORMAL
VTG: NORMAL

## 2024-12-13 PROCEDURE — 94618 PULMONARY STRESS TESTING: CPT

## 2024-12-13 PROCEDURE — 94729 DIFFUSING CAPACITY: CPT

## 2024-12-13 PROCEDURE — 94060 EVALUATION OF WHEEZING: CPT

## 2024-12-13 PROCEDURE — 6370000000 HC RX 637 (ALT 250 FOR IP): Performed by: INTERNAL MEDICINE

## 2024-12-13 PROCEDURE — 94726 PLETHYSMOGRAPHY LUNG VOLUMES: CPT

## 2024-12-13 RX ORDER — ALBUTEROL SULFATE 90 UG/1
4 INHALANT RESPIRATORY (INHALATION) ONCE
Status: COMPLETED | OUTPATIENT
Start: 2024-12-13 | End: 2024-12-13

## 2024-12-13 RX ADMIN — Medication 4 PUFF: at 09:20

## 2024-12-13 ASSESSMENT — PULMONARY FUNCTION TESTS
FEV1/FVC_PERCENT_PREDICTED_POST: 102
FEV1_PERCENT_PREDICTED_POST: 107
FEV1_PERCENT_PREDICTED_PRE: 104
FEV1/FVC_PERCENT_PREDICTED_PRE: 95
FVC_PERCENT_PREDICTED_PRE: 108
FVC_PERCENT_PREDICTED_POST: 103

## 2024-12-13 NOTE — PROCEDURES
PROCEDURE NOTE  Date: 12/13/2024   Name: Estefanía Hoyt  YOB: 1955    Procedures  [facility] Pulmonary Function Lab - Six Minute Walk    Test Performed on:   Room Air__X____   Oxygen at _____ lpm via N/C- continuous Oxygen at _____ lpm via N/C- OCD  Assist Device Used During Test:  None___X___ Cane______ Walker______    Modified Kevin's Scale  0 Nothing at all 5 Strong    0.5 Just noticeable 6 Stronger (Hard)    1 Very Light 7 Very Strong   2 Light 8 Very Very Strong   3 Moderate 9 Extremely Strong   4 Somewhat Strong 10 Maximum All out      Time SpO2 Heart Rate Respiratory Rate Dyspnea-  Modified Kevin’s Scale Fatigue- Modified Kevin’s Scale Other Symptoms   Baseline                     96% room air@rest 74 18 0.5 0.5      1 minute                     95% 87 18 1 1    2 minutes                     95% 91 19 2 2      3 minutes                     93% 95 19 3 3    4 minutes                     91% 91 20 3 3    5 minutes                     96% 90 20 4 4    6 minutes                     96% 95 21 4 4    Recovery x 1 minute                     97% 82 20 2 2    Recovery x 2 Minute                     97% 86 19 1 1       Number of Laps___11____ X 120 feet + _________ additional feet = Total Distance __1320___feet     Total expected 6 MW distance is __1233___feet. Patient achieved _107__% of expected distance.   Pre Blood Pressure:  Post Blood Pressure:  Other symptoms at the end of exercise: sob, little dizzy/lightheadedness, hip pain

## 2024-12-13 NOTE — PROGRESS NOTES
[facility]   Cardiac Followup    Referring Provider:  Maria Victoria Gill, APRN - CNP     No chief complaint on file.     She reestablished care with me in June 2023.  Last OV prior was 10/17/23  Subjective: Ms Hoyt is here for cardiology follow up;       History of Present Illness:   Ms. Hoyt is a 69 y.o. female here to  She has PMH HTN, chronic diastolic CHF, Hypothyroidism, GERD, mild VANESSA (noncompliant with CPAP).  She saw my partner Dr. Sylvester as new pt 5/20/19 for HTN, TATUM, and abnormal EKG. Prior testing: EKG 5/20/19  marked SB 47 bpm; LAE; NST abnormality. Stress ECHO 6/4/19 Significant hypertensive response to exercise. Significant LVH on baseline echo. Near cavity obliteration at peak stress but otherwise normal stress ECHO without ischemia. EF 60%. Sleep study 6/27/19 revealed Mild VANESSA. At OV 10/1/19 she c/o fatigue so I reduced Toprol XL from 50 to 25mg daily and this improved. No longer takes Toprol now. .    Echo 7/21/23  EF=55-60%. mild cLVH. Trace MR and TR.  Yoli nuc 7/25/23 unremarkable with no clear ischemia or scar. Followed with Dr. Long who prior stopped HCTZ and told to avoid NSAIDs.   Patient follows with Dr. Sampson with pulmonary for SOB and CPAP therapy for VANESSA. Patient underwent PFT testing 11/2023 that was unremarkable per Dr. Sampson's note. She f/u with Dr. Sampson again 12/2024 and reported continued SOB. It was recommended to repeat PFT's/6 minute walk testing and HRCT- (scheduled for 1/11/2025).   Today she reports not wearing CPAP x 4 months. She states she will being having a repeat of her pulmonary testing. Dr. Sampson mentioned possible mild asthma. She is undergoing PT for strengthening exercises. Reports her back pain is less and overall her breathing is better. She states she has incontinence. Overall she feels that her SOB is better. She still has some SOB with exertion like stairs.  She has maria l started on Ozempic for weight loss. Patient currently denies any

## 2024-12-17 ENCOUNTER — OFFICE VISIT (OUTPATIENT)
Dept: CARDIOLOGY CLINIC | Age: 69
End: 2024-12-17
Payer: MEDICARE

## 2024-12-17 ENCOUNTER — TELEPHONE (OUTPATIENT)
Dept: CARDIOLOGY CLINIC | Age: 69
End: 2024-12-17

## 2024-12-17 VITALS
BODY MASS INDEX: 37.2 KG/M2 | HEIGHT: 67 IN | SYSTOLIC BLOOD PRESSURE: 126 MMHG | WEIGHT: 237 LBS | HEART RATE: 86 BPM | DIASTOLIC BLOOD PRESSURE: 70 MMHG | OXYGEN SATURATION: 96 %

## 2024-12-17 DIAGNOSIS — I50.32 CHRONIC DIASTOLIC CONGESTIVE HEART FAILURE (HCC): ICD-10-CM

## 2024-12-17 DIAGNOSIS — I10 ESSENTIAL HYPERTENSION: Primary | ICD-10-CM

## 2024-12-17 DIAGNOSIS — R06.09 DOE (DYSPNEA ON EXERTION): ICD-10-CM

## 2024-12-17 PROCEDURE — 93000 ELECTROCARDIOGRAM COMPLETE: CPT | Performed by: INTERNAL MEDICINE

## 2024-12-17 PROCEDURE — 1159F MED LIST DOCD IN RCRD: CPT | Performed by: INTERNAL MEDICINE

## 2024-12-17 PROCEDURE — G8484 FLU IMMUNIZE NO ADMIN: HCPCS | Performed by: INTERNAL MEDICINE

## 2024-12-17 PROCEDURE — 3074F SYST BP LT 130 MM HG: CPT | Performed by: INTERNAL MEDICINE

## 2024-12-17 PROCEDURE — G8399 PT W/DXA RESULTS DOCUMENT: HCPCS | Performed by: INTERNAL MEDICINE

## 2024-12-17 PROCEDURE — 99214 OFFICE O/P EST MOD 30 MIN: CPT | Performed by: INTERNAL MEDICINE

## 2024-12-17 PROCEDURE — G8427 DOCREV CUR MEDS BY ELIG CLIN: HCPCS | Performed by: INTERNAL MEDICINE

## 2024-12-17 PROCEDURE — G8417 CALC BMI ABV UP PARAM F/U: HCPCS | Performed by: INTERNAL MEDICINE

## 2024-12-17 PROCEDURE — 3078F DIAST BP <80 MM HG: CPT | Performed by: INTERNAL MEDICINE

## 2024-12-17 PROCEDURE — 3017F COLORECTAL CA SCREEN DOC REV: CPT | Performed by: INTERNAL MEDICINE

## 2024-12-17 PROCEDURE — 1123F ACP DISCUSS/DSCN MKR DOCD: CPT | Performed by: INTERNAL MEDICINE

## 2024-12-17 PROCEDURE — 1036F TOBACCO NON-USER: CPT | Performed by: INTERNAL MEDICINE

## 2024-12-17 PROCEDURE — 1090F PRES/ABSN URINE INCON ASSESS: CPT | Performed by: INTERNAL MEDICINE

## 2024-12-17 NOTE — PATIENT INSTRUCTIONS
Plan:  Call and let us know when you have completed you pulmonary testing. If testing is normal then we will proceed with a right and left heart cath   NO changes in medications today   Ok for travel     4.   Follow up with me in 4 months

## 2024-12-17 NOTE — TELEPHONE ENCOUNTER
Sending this to Afia so you are aware. Patient is having a repeat workup through pulmonary. If that testing is normal, then [facility] would like to proceed with right and left heart cath. Patient is instructed to call once her testing I completed. Just wanted to let you know and keep you in the loop.

## 2025-01-06 RX ORDER — LOSARTAN POTASSIUM 100 MG/1
100 TABLET ORAL DAILY
Qty: 90 TABLET | Refills: 1 | Status: SHIPPED | OUTPATIENT
Start: 2025-01-06

## 2025-01-06 RX ORDER — DOXAZOSIN 4 MG/1
4 TABLET ORAL NIGHTLY
Qty: 90 TABLET | Refills: 1 | Status: SHIPPED | OUTPATIENT
Start: 2025-01-06

## 2025-01-06 NOTE — TELEPHONE ENCOUNTER
Last Office Visit: 12/17/2024 Provider: OLIVA  **Is provider OOT? No    Next Office Visit: 4/24/2025 Provider: OLIVA    **Has patient already had 30 day supply? No    Lab orders needed? no   Encounter provider correct? Yes If not, change provider  Script changes since last refill? no    LAST LABS:   CMP:   Lab Results   Component Value Date     12/08/2024    K 4.1 12/08/2024     12/08/2024    CO2 24 12/08/2024    BUN 16 12/08/2024    CREATININE 1.0 12/08/2024    GLUCOSE 116 (H) 12/08/2024    CALCIUM 9.5 12/08/2024    BILITOT 1.0 12/08/2024    ALKPHOS 98 12/08/2024    AST 24 12/08/2024    ALT 26 12/08/2024    LABGLOM 61 12/08/2024    GFRAA >60 07/18/2022    AGRATIO 1.3 12/08/2024    GLOB 2.3 07/10/2020

## 2025-01-11 ENCOUNTER — HOSPITAL ENCOUNTER (OUTPATIENT)
Dept: CT IMAGING | Age: 70
Discharge: HOME | End: 2025-01-11
Payer: MEDICARE

## 2025-01-11 DIAGNOSIS — R06.09 DOE (DYSPNEA ON EXERTION): ICD-10-CM

## 2025-01-11 PROCEDURE — 71250 CT THORAX DX C-: CPT

## 2025-01-13 ENCOUNTER — HOSPITAL ENCOUNTER (OUTPATIENT)
Age: 70
Discharge: HOME | End: 2025-01-13
Payer: MEDICARE

## 2025-01-13 DIAGNOSIS — R06.09 DOE (DYSPNEA ON EXERTION): ICD-10-CM

## 2025-01-13 LAB — D-DIMER QUANTITATIVE: 0.73 UG/ML FEU (ref 0–0.6)

## 2025-01-13 PROCEDURE — 36415 COLL VENOUS BLD VENIPUNCTURE: CPT

## 2025-01-13 PROCEDURE — 85379 FIBRIN DEGRADATION QUANT: CPT

## 2025-01-14 ENCOUNTER — HOSPITAL ENCOUNTER (OUTPATIENT)
Dept: CT IMAGING | Age: 70
Discharge: HOME | End: 2025-01-14
Payer: MEDICARE

## 2025-01-14 DIAGNOSIS — I26.99 PULMONARY EMBOLISM, UNSPECIFIED CHRONICITY, UNSPECIFIED PULMONARY EMBOLISM TYPE, UNSPECIFIED WHETHER ACUTE COR PULMONALE PRESENT (HCC): ICD-10-CM

## 2025-01-14 DIAGNOSIS — R79.89 ELEVATED D-DIMER: ICD-10-CM

## 2025-01-14 PROCEDURE — 71260 CT THORAX DX C+: CPT

## 2025-01-14 PROCEDURE — 6360000004 HC RX CONTRAST MEDICATION: Performed by: INTERNAL MEDICINE

## 2025-01-14 RX ORDER — IOPAMIDOL 755 MG/ML
75 INJECTION, SOLUTION INTRAVASCULAR
Status: COMPLETED | OUTPATIENT
Start: 2025-01-14 | End: 2025-01-14

## 2025-01-14 RX ADMIN — IOPAMIDOL 75 ML: 755 INJECTION, SOLUTION INTRAVENOUS at 15:36

## 2025-01-22 ENCOUNTER — OFFICE VISIT (OUTPATIENT)
Dept: PULMONOLOGY | Age: 70
End: 2025-01-22
Payer: MEDICARE

## 2025-01-22 VITALS
OXYGEN SATURATION: 97 % | SYSTOLIC BLOOD PRESSURE: 116 MMHG | HEIGHT: 67 IN | RESPIRATION RATE: 16 BRPM | HEART RATE: 65 BPM | BODY MASS INDEX: 37.25 KG/M2 | DIASTOLIC BLOOD PRESSURE: 72 MMHG | WEIGHT: 237.3 LBS

## 2025-01-22 DIAGNOSIS — R91.1 PULMONARY NODULE: ICD-10-CM

## 2025-01-22 DIAGNOSIS — R06.09 DYSPNEA ON EXERTION: ICD-10-CM

## 2025-01-22 DIAGNOSIS — G47.33 OSA (OBSTRUCTIVE SLEEP APNEA): Primary | ICD-10-CM

## 2025-01-22 PROCEDURE — 1090F PRES/ABSN URINE INCON ASSESS: CPT | Performed by: INTERNAL MEDICINE

## 2025-01-22 PROCEDURE — 1159F MED LIST DOCD IN RCRD: CPT | Performed by: INTERNAL MEDICINE

## 2025-01-22 PROCEDURE — 3078F DIAST BP <80 MM HG: CPT | Performed by: INTERNAL MEDICINE

## 2025-01-22 PROCEDURE — G8417 CALC BMI ABV UP PARAM F/U: HCPCS | Performed by: INTERNAL MEDICINE

## 2025-01-22 PROCEDURE — 1036F TOBACCO NON-USER: CPT | Performed by: INTERNAL MEDICINE

## 2025-01-22 PROCEDURE — G8399 PT W/DXA RESULTS DOCUMENT: HCPCS | Performed by: INTERNAL MEDICINE

## 2025-01-22 PROCEDURE — G8427 DOCREV CUR MEDS BY ELIG CLIN: HCPCS | Performed by: INTERNAL MEDICINE

## 2025-01-22 PROCEDURE — 99214 OFFICE O/P EST MOD 30 MIN: CPT | Performed by: INTERNAL MEDICINE

## 2025-01-22 PROCEDURE — 3017F COLORECTAL CA SCREEN DOC REV: CPT | Performed by: INTERNAL MEDICINE

## 2025-01-22 PROCEDURE — 3074F SYST BP LT 130 MM HG: CPT | Performed by: INTERNAL MEDICINE

## 2025-01-22 PROCEDURE — 1123F ACP DISCUSS/DSCN MKR DOCD: CPT | Performed by: INTERNAL MEDICINE

## 2025-01-22 ASSESSMENT — SLEEP AND FATIGUE QUESTIONNAIRES
HOW LIKELY ARE YOU TO NOD OFF OR FALL ASLEEP IN A CAR, WHILE STOPPED FOR A FEW MINUTES IN TRAFFIC: WOULD NEVER DOZE
ESS TOTAL SCORE: 3
HOW LIKELY ARE YOU TO NOD OFF OR FALL ASLEEP WHILE SITTING AND TALKING TO SOMEONE: WOULD NEVER DOZE
HOW LIKELY ARE YOU TO NOD OFF OR FALL ASLEEP WHILE SITTING QUIETLY AFTER LUNCH WITHOUT ALCOHOL: SLIGHT CHANCE OF DOZING
HOW LIKELY ARE YOU TO NOD OFF OR FALL ASLEEP WHILE LYING DOWN TO REST IN THE AFTERNOON WHEN CIRCUMSTANCES PERMIT: SLIGHT CHANCE OF DOZING
HOW LIKELY ARE YOU TO NOD OFF OR FALL ASLEEP WHEN YOU ARE A PASSENGER IN A CAR FOR AN HOUR WITHOUT A BREAK: WOULD NEVER DOZE
HOW LIKELY ARE YOU TO NOD OFF OR FALL ASLEEP WHILE SITTING AND READING: WOULD NEVER DOZE
HOW LIKELY ARE YOU TO NOD OFF OR FALL ASLEEP WHILE SITTING INACTIVE IN A PUBLIC PLACE: WOULD NEVER DOZE
HOW LIKELY ARE YOU TO NOD OFF OR FALL ASLEEP WHILE WATCHING TV: SLIGHT CHANCE OF DOZING

## 2025-01-22 NOTE — PROGRESS NOTES
noncompliance with CPAP, and deconditioning are contributing. Unremarkable PFTs.  Somewhat improving with the weight loss  Some seasonal allergy to ragweed - itchy nose, eyes, runny nose, little cough clearing her throat.   Environmental allergies  Pulmonary nodules, largest 7 mm RLL on CT 1/11/2025. DDx granuloma, inflammatory, scarring, malignancy  Life long nonsmoker   Mild VANESSA. CPAP 11 cmH2O. Better compliance with controlled AHI  Morbid obesity 245---> 242 ---> 237       Plan:       Options of Bx, resection and watchful waiting were discussed with patient. I recommend radiographic follow up CT chest follow-up lung nodule in 3 months  Continue albuterol 2 puffs Q4-6 hrs PRN  Hold off cardiopulmonary exercise testing given improvement  Order for CPAP supplies  Change to nasal pillow   CPAP 11 cmH2O, advised to use 6-8 hrs at night and during naps.  Replacement of mask, tubing, head straps every 3-6 months or sooner if damaged.   Follow up CPAP compliance and pressure adjustment if needed  Sleep hygiene  Avoid sedatives, alcohol and caffeinated drinks at bed time.  No driving motorized vehicles or operating heavy machinery while fatigue, drowsy or sleepy.   Weight loss is also recommended as a long-term intervention.    Follow up in 3 months or sooner if needed

## 2025-02-14 ENCOUNTER — TELEPHONE (OUTPATIENT)
Dept: CARDIOLOGY CLINIC | Age: 70
End: 2025-02-14

## 2025-02-14 NOTE — TELEPHONE ENCOUNTER
CARDIAC CLEARANCE REQUEST    What type of procedure are you having:  Lizandroi impant  Are you taking any blood thinners:  Fish oil, pt stopped already  Dont take Losartan morning off  Type on anesthesia:  Renick  When is your procedure scheduled for:  2.17.2025  What physician is performing your procedure:  Dr Wheeler  Phone Number:  [phone]   Fax number to send the letter:   [phone]     Last ov: 12[facility]17.[facility]  Next ov: 4.24.2025 [facility]

## 2025-04-23 ENCOUNTER — HOSPITAL ENCOUNTER (OUTPATIENT)
Dept: CT IMAGING | Age: 70
Discharge: HOME | End: 2025-04-23
Payer: MEDICARE

## 2025-04-23 DIAGNOSIS — R91.1 PULMONARY NODULE: ICD-10-CM

## 2025-04-23 PROCEDURE — 71250 CT THORAX DX C-: CPT

## 2025-04-24 ENCOUNTER — OFFICE VISIT (OUTPATIENT)
Dept: CARDIOLOGY CLINIC | Age: 70
End: 2025-04-24
Payer: MEDICARE

## 2025-04-24 VITALS
WEIGHT: 236 LBS | DIASTOLIC BLOOD PRESSURE: 70 MMHG | BODY MASS INDEX: 37.04 KG/M2 | SYSTOLIC BLOOD PRESSURE: 114 MMHG | HEART RATE: 69 BPM | OXYGEN SATURATION: 96 % | HEIGHT: 67 IN

## 2025-04-24 DIAGNOSIS — G47.33 MILD OBSTRUCTIVE SLEEP APNEA: ICD-10-CM

## 2025-04-24 DIAGNOSIS — I10 ESSENTIAL HYPERTENSION: Primary | ICD-10-CM

## 2025-04-24 DIAGNOSIS — I50.32 CHRONIC DIASTOLIC CONGESTIVE HEART FAILURE (HCC): ICD-10-CM

## 2025-04-24 PROCEDURE — G8427 DOCREV CUR MEDS BY ELIG CLIN: HCPCS | Performed by: INTERNAL MEDICINE

## 2025-04-24 PROCEDURE — 1159F MED LIST DOCD IN RCRD: CPT | Performed by: INTERNAL MEDICINE

## 2025-04-24 PROCEDURE — 1036F TOBACCO NON-USER: CPT | Performed by: INTERNAL MEDICINE

## 2025-04-24 PROCEDURE — 3078F DIAST BP <80 MM HG: CPT | Performed by: INTERNAL MEDICINE

## 2025-04-24 PROCEDURE — 3017F COLORECTAL CA SCREEN DOC REV: CPT | Performed by: INTERNAL MEDICINE

## 2025-04-24 PROCEDURE — 99214 OFFICE O/P EST MOD 30 MIN: CPT | Performed by: INTERNAL MEDICINE

## 2025-04-24 PROCEDURE — G8399 PT W/DXA RESULTS DOCUMENT: HCPCS | Performed by: INTERNAL MEDICINE

## 2025-04-24 PROCEDURE — 1123F ACP DISCUSS/DSCN MKR DOCD: CPT | Performed by: INTERNAL MEDICINE

## 2025-04-24 PROCEDURE — 1090F PRES/ABSN URINE INCON ASSESS: CPT | Performed by: INTERNAL MEDICINE

## 2025-04-24 PROCEDURE — 3074F SYST BP LT 130 MM HG: CPT | Performed by: INTERNAL MEDICINE

## 2025-04-24 PROCEDURE — G8417 CALC BMI ABV UP PARAM F/U: HCPCS | Performed by: INTERNAL MEDICINE

## 2025-04-24 RX ORDER — FUROSEMIDE 40 MG/1
TABLET ORAL
Qty: 30 TABLET | Refills: 3
Start: 2025-04-24

## 2025-04-24 RX ORDER — LOSARTAN POTASSIUM 100 MG/1
100 TABLET ORAL DAILY
Qty: 90 TABLET | Refills: 3 | Status: SHIPPED | OUTPATIENT
Start: 2025-04-24

## 2025-04-24 RX ORDER — DOXAZOSIN 4 MG/1
4 TABLET ORAL NIGHTLY
Qty: 90 TABLET | Refills: 3 | Status: SHIPPED | OUTPATIENT
Start: 2025-04-24

## 2025-04-24 NOTE — PROGRESS NOTES
[facility]   Cardiac Followup    Referring Provider:  Maria Victoria Gill, APRN - CNP     No chief complaint on file.     She reestablished care with me in June 2023.  Last OV prior was 10/17/23  Subjective: Ms Hoyt is here for cardiology follow up;       History of Present Illness:   Ms. Hoyt is a 69 y.o. female here to  She has PMH HTN, chronic diastolic CHF, Hypothyroidism, GERD, mild VANESSA (noncompliant with CPAP).  She saw my partner Dr. Sylvester as new pt 5/20/19 for HTN, TATUM, and abnormal EKG. Prior testing: EKG 5/20/19  marked SB 47 bpm; LAE; NST abnormality. Stress ECHO 6/4/19 Significant hypertensive response to exercise. Significant LVH on baseline echo. Near cavity obliteration at peak stress but otherwise normal stress ECHO without ischemia. EF 60%. Sleep study 6/27/19 revealed Mild VANESSA. At OV 10/1/19 she c/o fatigue so I reduced Toprol XL from 50 to 25mg daily and this improved. No longer takes Toprol now. .    Echo 7/21/23  EF=55-60%. mild cLVH. Trace MR and TR.  Yoli nuc 7/25/23 unremarkable with no clear ischemia or scar. Followed with Dr. Long who prior stopped HCTZ and told to avoid NSAIDs.   Patient follows with Dr. Sampson with pulmonary for SOB and CPAP therapy for VANESSA. Patient underwent PFT testing 11/2023 that was unremarkable per Dr. Sampson's note. She f/u with Dr. Sampson again 12/2024 and reported continued SOB. It was recommended to repeat PFT's/6 minute walk testing and HRCT- (scheduled for 1/11/2025). EKG 12/17/24 shows SR, no change from prior EKG 11/2023.            I used to see her  who passed away in 2016.    Weight today is 237# (Up# from 12/2024).    Past Medical History:   has a past medical history of Acquired hypothyroidism, Adjustment reaction with anxiety and depression, Chronic diastolic congestive heart failure (HCC), Chronic kidney disease, Depression, Diabetes mellitus (HCC), Essential hypertension, Gastroesophageal reflux disease without esophagitis, 
carotid upstroke is normal in amplitude and contour without delay or bruit  Normal S1S2, No S3, No Murmur  Peripheral pulses are symmetrical and full  There is no clubbing, cyanosis of the extremities.  Trace-1+ BLE edema  Femoral Arteries: 2+ and equal  Pedal Pulses: 2+ and equal   Abdomen:  No masses or tenderness  Liver/Spleen: No Abnormalities Noted  Neurological/Psychiatric:  Alert and oriented in all spheres  Moves all extremities well  Exhibits normal gait balance and coordination  No abnormalities of mood, affect, memory, mentation, or behavior are noted        Skin: warm and dry    Lab Results   Component Value Date    CHOL 149 12/08/2024    CHOL 156 06/27/2023    CHOL 159 12/23/2021     Lab Results   Component Value Date    TRIG 90 12/08/2024    TRIG 96 06/27/2023    TRIG 80 12/23/2021     Lab Results   Component Value Date    HDL 32 (L) 12/08/2024    HDL 42 06/27/2023    HDL 44 12/23/2021     Lab Results   Component Value Date    LDL 99 12/08/2024    LDL 95 06/27/2023    LDL 99 12/23/2021     I have personally reviewed all labs including lipids    Assessment:     1. Essential hypertension: Well controlled and will continue current medical regimen losartan 100mg qd, lasix 40mg PRN, and doxazosin 4mg qd. Would increase lasix to daily scheduled dosing if has elevated BP in future.      2.      Diastolic CHF: Likely hypertensive heart disease. She will need to continue good BP control and diuretics for LE edema and SOB. Continue losartan 100mg qd and lasix 40 PRN. I counseled her about salt and fluid restriction..   .  3.       LE edema: Trace. Improved being off norvasc. Continue lasix 40mg prn and fluid/salt restrict.     4.       SOB:  Symptoms improved. July 2023 non-invasive cardiac testing unremarkable. Following Dr. Sampson. He fele obesity, LBP, noncompliance with CPAP, deconditioning contributing. Unremarkable PFT's. No need for cardiac testing at this time given improved clinically.     5.

## 2025-04-24 NOTE — PATIENT INSTRUCTIONS
Plan:  Medications reviewed in office. Medications refilled as warranted  Labs reviewed in epic and discussed with patient.  No cardiac testing at this time.  You can help decrease your swelling by:  -Weighing yourself every morning   -Following a No Added Salt/Low Sodium diet of less than 3000 mg sodium daily  -Following a Fluid Limitation of less than 2 liters (64 ounces daily)                           -this also includes foods like soup, ice cream, popsicles  -Elevating your legs when sitting  -Wearing compression socks during the day      Follow up with me in 1 year or sooner if needed.

## 2025-04-29 ENCOUNTER — OFFICE VISIT (OUTPATIENT)
Dept: PULMONOLOGY | Age: 70
End: 2025-04-29
Payer: MEDICARE

## 2025-04-29 VITALS
HEART RATE: 70 BPM | HEIGHT: 67 IN | SYSTOLIC BLOOD PRESSURE: 124 MMHG | OXYGEN SATURATION: 97 % | BODY MASS INDEX: 37.73 KG/M2 | RESPIRATION RATE: 16 BRPM | WEIGHT: 240.4 LBS | DIASTOLIC BLOOD PRESSURE: 80 MMHG

## 2025-04-29 DIAGNOSIS — R06.09 DYSPNEA ON EXERTION: ICD-10-CM

## 2025-04-29 DIAGNOSIS — R91.1 PULMONARY NODULE: Primary | ICD-10-CM

## 2025-04-29 DIAGNOSIS — G47.33 OSA (OBSTRUCTIVE SLEEP APNEA): ICD-10-CM

## 2025-04-29 PROCEDURE — G8427 DOCREV CUR MEDS BY ELIG CLIN: HCPCS | Performed by: INTERNAL MEDICINE

## 2025-04-29 PROCEDURE — 1123F ACP DISCUSS/DSCN MKR DOCD: CPT | Performed by: INTERNAL MEDICINE

## 2025-04-29 PROCEDURE — 1090F PRES/ABSN URINE INCON ASSESS: CPT | Performed by: INTERNAL MEDICINE

## 2025-04-29 PROCEDURE — 3017F COLORECTAL CA SCREEN DOC REV: CPT | Performed by: INTERNAL MEDICINE

## 2025-04-29 PROCEDURE — 99214 OFFICE O/P EST MOD 30 MIN: CPT | Performed by: INTERNAL MEDICINE

## 2025-04-29 PROCEDURE — G8399 PT W/DXA RESULTS DOCUMENT: HCPCS | Performed by: INTERNAL MEDICINE

## 2025-04-29 PROCEDURE — 1036F TOBACCO NON-USER: CPT | Performed by: INTERNAL MEDICINE

## 2025-04-29 PROCEDURE — 1159F MED LIST DOCD IN RCRD: CPT | Performed by: INTERNAL MEDICINE

## 2025-04-29 PROCEDURE — 3074F SYST BP LT 130 MM HG: CPT | Performed by: INTERNAL MEDICINE

## 2025-04-29 PROCEDURE — G8417 CALC BMI ABV UP PARAM F/U: HCPCS | Performed by: INTERNAL MEDICINE

## 2025-04-29 PROCEDURE — 3079F DIAST BP 80-89 MM HG: CPT | Performed by: INTERNAL MEDICINE

## 2025-04-29 ASSESSMENT — SLEEP AND FATIGUE QUESTIONNAIRES
HOW LIKELY ARE YOU TO NOD OFF OR FALL ASLEEP WHILE SITTING AND READING: SLIGHT CHANCE OF DOZING
HOW LIKELY ARE YOU TO NOD OFF OR FALL ASLEEP WHILE WATCHING TV: SLIGHT CHANCE OF DOZING
ESS TOTAL SCORE: 4
HOW LIKELY ARE YOU TO NOD OFF OR FALL ASLEEP WHEN YOU ARE A PASSENGER IN A CAR FOR AN HOUR WITHOUT A BREAK: WOULD NEVER DOZE
HOW LIKELY ARE YOU TO NOD OFF OR FALL ASLEEP IN A CAR, WHILE STOPPED FOR A FEW MINUTES IN TRAFFIC: WOULD NEVER DOZE
HOW LIKELY ARE YOU TO NOD OFF OR FALL ASLEEP WHILE SITTING INACTIVE IN A PUBLIC PLACE: WOULD NEVER DOZE
HOW LIKELY ARE YOU TO NOD OFF OR FALL ASLEEP WHILE SITTING AND TALKING TO SOMEONE: WOULD NEVER DOZE
HOW LIKELY ARE YOU TO NOD OFF OR FALL ASLEEP WHILE LYING DOWN TO REST IN THE AFTERNOON WHEN CIRCUMSTANCES PERMIT: SLIGHT CHANCE OF DOZING
HOW LIKELY ARE YOU TO NOD OFF OR FALL ASLEEP WHILE SITTING QUIETLY AFTER LUNCH WITHOUT ALCOHOL: SLIGHT CHANCE OF DOZING

## 2025-04-29 NOTE — PROGRESS NOTES
smokeless tobacco.  ETOH:   reports current alcohol use of about 1.0 standard drink of alcohol per week.      Family History:       Problem Relation Age of Onset    Heart Disease Mother         Afib    Alzheimer's Disease Mother     Atrial Fibrillation Mother     Arthritis Father     Hearing Loss Father     Heart Disease Father     Other Father         Alpha One (Antitrypsin Deficiency)    Coronary Art Dis Father     Heart Attack Father     Miscarriages / Stillbirths Sister     Other Sister         MS and Alpha 1 antitrypson deficiency    Kidney Disease Brother         Stones    Stroke Maternal Grandmother     Heart Disease Maternal Grandmother     Cancer Maternal Grandmother         Uterine cancer    High Blood Pressure Maternal Grandmother     Diabetes Paternal Grandfather     Diabetes Paternal Aunt     Other Paternal Uncle         Alpha 1 antitrypson deficiency    Early Death Maternal Grandfather 54    Other Paternal Grandmother         Alpha 1 antitrypson deficiency    Cirrhosis Paternal Grandmother         Non alcoholic    Early Death Paternal Grandmother         Cirrhosis; likely from Alpha One    Diabetes Paternal Aunt     Immune Disorder Sister         MS    Miscarriages / Stillbirths Sister     Substance Abuse Maternal Uncle         Alcoholism    Other Paternal Uncle         Alpha One (Antitrypsin Deficiency)    Asthma Neg Hx     Birth Defects Neg Hx     Depression Neg Hx     High Cholesterol Neg Hx     Learning Disabilities Neg Hx     Mental Illness Neg Hx     Mental Retardation Neg Hx        Current Medications:    Current Outpatient Medications:     Semaglutide,0.25 or 0.5MG/DOS, 2 MG/3ML SOPN, Inject 0.25 mg into the skin every 7 days, Disp: , Rfl:     doxazosin (CARDURA) 4 MG tablet, Take 1 tablet by mouth nightly, Disp: 90 tablet, Rfl: 3    losartan (COZAAR) 100 MG tablet, Take 1 tablet by mouth daily, Disp: 90 tablet, Rfl: 3    furosemide (LASIX) 40 MG tablet, TAKE 1 TABLET BY MOUTH DAILY AS NEEDED

## 2025-07-03 DIAGNOSIS — I50.32 CHRONIC DIASTOLIC CONGESTIVE HEART FAILURE (HCC): ICD-10-CM

## 2025-07-03 DIAGNOSIS — I10 ESSENTIAL HYPERTENSION: ICD-10-CM

## 2025-07-03 RX ORDER — FUROSEMIDE 40 MG/1
40 TABLET ORAL DAILY
Qty: 60 TABLET | Refills: 0 | Status: SHIPPED | OUTPATIENT
Start: 2025-07-03

## 2025-07-03 NOTE — TELEPHONE ENCOUNTER
Last Office Visit: 4/24/2025 Provider: OLIVA  **Is provider OOT? Yes    Next Office Visit:   **If no OV, when does pt need to be seen? in 1 year(s)      LAST LABS:   BMP:  Lab Results   Component Value Date/Time     12/08/2024 11:48 AM    K 4.1 12/08/2024 11:48 AM    K 3.6 11/01/2023 05:15 PM     12/08/2024 11:48 AM    CO2 24 12/08/2024 11:48 AM    BUN 16 12/08/2024 11:48 AM    CREATININE 1.0 12/08/2024 11:48 AM    GLUCOSE 116 12/08/2024 11:48 AM    CALCIUM 9.5 12/08/2024 11:48 AM    LABGLOM 61 12/08/2024 11:48 AM    LABGLOM 55 12/01/2023 10:00 AM      Lab orders needed? no

## (undated) DEVICE — CONMED SCOPE SAVER BITE BLOCK, 20X27 MM: Brand: SCOPE SAVER

## (undated) DEVICE — Z DISCONTINUED USE 2276105 GOWN PROTCT UNIV CHST W28IN L49IN SL 24IN BLU SPUNBOND FLM

## (undated) DEVICE — ELECTRODE ECG MONITR FOAM TEAR DROP ADLT RED

## (undated) DEVICE — Device

## (undated) DEVICE — KIT INF CTRL 2OZ LUB TBNG L12FT DBL END BRSH SYR OP4

## (undated) DEVICE — FORCEPS ENDOSCP BX L230CM DIA28MM ALGTR CUP SPEC RETRV GI